# Patient Record
Sex: FEMALE | Race: WHITE | NOT HISPANIC OR LATINO | Employment: OTHER | URBAN - METROPOLITAN AREA
[De-identification: names, ages, dates, MRNs, and addresses within clinical notes are randomized per-mention and may not be internally consistent; named-entity substitution may affect disease eponyms.]

---

## 2017-02-20 ENCOUNTER — CONVERSION ENCOUNTER (OUTPATIENT)
Dept: GENERAL RADIOLOGY | Facility: HOSPITAL | Age: 42
End: 2017-02-20

## 2018-01-22 ENCOUNTER — CONVERSION ENCOUNTER (OUTPATIENT)
Dept: ORTHOPEDIC SURGERY | Facility: CLINIC | Age: 43
End: 2018-01-22

## 2018-01-22 ENCOUNTER — OFFICE VISIT CONVERTED (OUTPATIENT)
Dept: ORTHOPEDIC SURGERY | Facility: CLINIC | Age: 43
End: 2018-01-22
Attending: ORTHOPAEDIC SURGERY

## 2018-03-16 ENCOUNTER — OFFICE VISIT CONVERTED (OUTPATIENT)
Dept: ORTHOPEDIC SURGERY | Facility: CLINIC | Age: 43
End: 2018-03-16
Attending: ORTHOPAEDIC SURGERY

## 2018-05-02 ENCOUNTER — CONVERSION ENCOUNTER (OUTPATIENT)
Dept: GENERAL RADIOLOGY | Facility: HOSPITAL | Age: 43
End: 2018-05-02

## 2018-05-22 ENCOUNTER — CONVERSION ENCOUNTER (OUTPATIENT)
Dept: MAMMOGRAPHY | Facility: HOSPITAL | Age: 43
End: 2018-05-22

## 2018-05-24 ENCOUNTER — CONVERSION ENCOUNTER (OUTPATIENT)
Dept: ULTRASOUND IMAGING | Facility: HOSPITAL | Age: 43
End: 2018-05-24

## 2018-05-31 ENCOUNTER — OFFICE VISIT CONVERTED (OUTPATIENT)
Dept: OTHER | Facility: HOSPITAL | Age: 43
End: 2018-05-31
Attending: SURGERY

## 2018-06-07 ENCOUNTER — TELEPHONE (OUTPATIENT)
Dept: SURGERY | Facility: CLINIC | Age: 43
End: 2018-06-07

## 2018-06-07 DIAGNOSIS — R92.8 ABNORMAL MRI, BREAST: Primary | ICD-10-CM

## 2018-06-07 NOTE — TELEPHONE ENCOUNTER
Patient called to let us know she is scheduled for second look US following Breast MRI @ [facility]   This is scheduled for Monday 6/11 at 2:30.     Zaire

## 2018-06-07 NOTE — TELEPHONE ENCOUNTER
New patient appt for 2nd opinion Left breast cancer-invasive. Breast MRI completed 6/6/18 [facility].     Records requested from [facility]   Patient will bring CD of breast imaging with her to appt.     Friday 6/15/18 7:30.     davidl

## 2018-06-13 ENCOUNTER — TELEPHONE (OUTPATIENT)
Dept: SURGERY | Facility: CLINIC | Age: 43
End: 2018-06-13

## 2018-06-13 NOTE — TELEPHONE ENCOUNTER
Scheduled 2x Mri guided biopsies one lt, one rt breast  Scheduled 2x US guided briopsies one Lt one Rt Breast  [facility] 6-14-18 @ 8:00      kdl

## 2018-06-14 ENCOUNTER — CONVERSION ENCOUNTER (OUTPATIENT)
Dept: MRI IMAGING | Facility: HOSPITAL | Age: 43
End: 2018-06-14

## 2018-06-20 ENCOUNTER — TELEPHONE (OUTPATIENT)
Dept: SURGERY | Facility: CLINIC | Age: 43
End: 2018-06-20

## 2018-06-20 NOTE — TELEPHONE ENCOUNTER
Patients appt here had to be moved due to additional RT Breast Bx [facility] 6/21/18. lml

## 2018-06-21 ENCOUNTER — OFFICE VISIT CONVERTED (OUTPATIENT)
Dept: SURGERY | Facility: CLINIC | Age: 43
End: 2018-06-21
Attending: SURGERY

## 2018-06-21 ENCOUNTER — CONVERSION ENCOUNTER (OUTPATIENT)
Dept: ULTRASOUND IMAGING | Facility: HOSPITAL | Age: 43
End: 2018-06-21

## 2018-07-02 ENCOUNTER — TELEPHONE (OUTPATIENT)
Dept: SURGERY | Facility: CLINIC | Age: 43
End: 2018-07-02

## 2018-07-02 DIAGNOSIS — R92.8 ABNORMAL MRI, BREAST: Primary | ICD-10-CM

## 2018-07-02 NOTE — TELEPHONE ENCOUNTER
I called patient to let her know she will need imaging Friday after she sees     Patient v/u    kdl

## 2018-07-03 ENCOUNTER — TELEPHONE (OUTPATIENT)
Dept: SURGERY | Facility: CLINIC | Age: 43
End: 2018-07-03

## 2018-07-06 ENCOUNTER — HOSPITAL ENCOUNTER (OUTPATIENT)
Dept: MAMMOGRAPHY | Facility: HOSPITAL | Age: 43
Discharge: HOME | End: 2018-07-06
Attending: SURGERY | Admitting: SURGERY

## 2018-07-06 ENCOUNTER — APPOINTMENT (OUTPATIENT)
Dept: ULTRASOUND IMAGING | Facility: HOSPITAL | Age: 43
End: 2018-07-06
Attending: SURGERY

## 2018-07-06 ENCOUNTER — OFFICE VISIT (OUTPATIENT)
Dept: SURGERY | Facility: CLINIC | Age: 43
End: 2018-07-06

## 2018-07-06 VITALS
DIASTOLIC BLOOD PRESSURE: 66 MMHG | SYSTOLIC BLOOD PRESSURE: 94 MMHG | HEART RATE: 66 BPM | BODY MASS INDEX: 25.77 KG/M2 | WEIGHT: 140.06 LBS | OXYGEN SATURATION: 98 % | HEIGHT: 62 IN

## 2018-07-06 DIAGNOSIS — C50.412 MALIGNANT NEOPLASM OF UPPER-OUTER QUADRANT OF LEFT BREAST IN FEMALE, ESTROGEN RECEPTOR POSITIVE (HCC): Primary | ICD-10-CM

## 2018-07-06 DIAGNOSIS — Z72.0 TOBACCO USE: ICD-10-CM

## 2018-07-06 DIAGNOSIS — Z80.8 FH: MELANOMA: ICD-10-CM

## 2018-07-06 DIAGNOSIS — N60.11 FIBROCYSTIC DISEASE OF RIGHT BREAST: ICD-10-CM

## 2018-07-06 DIAGNOSIS — Z80.42 FH: PROSTATE CANCER: ICD-10-CM

## 2018-07-06 DIAGNOSIS — Z17.0 MALIGNANT NEOPLASM OF LOWER-INNER QUADRANT OF LEFT BREAST IN FEMALE, ESTROGEN RECEPTOR POSITIVE (HCC): ICD-10-CM

## 2018-07-06 DIAGNOSIS — R92.8 ABNORMAL MRI, BREAST: ICD-10-CM

## 2018-07-06 DIAGNOSIS — Z80.3 FH: BREAST CANCER: ICD-10-CM

## 2018-07-06 DIAGNOSIS — Z17.0 MALIGNANT NEOPLASM OF UPPER-OUTER QUADRANT OF LEFT BREAST IN FEMALE, ESTROGEN RECEPTOR POSITIVE (HCC): Primary | ICD-10-CM

## 2018-07-06 DIAGNOSIS — C50.312 MALIGNANT NEOPLASM OF LOWER-INNER QUADRANT OF LEFT BREAST IN FEMALE, ESTROGEN RECEPTOR POSITIVE (HCC): ICD-10-CM

## 2018-07-06 PROCEDURE — 77065 DX MAMMO INCL CAD UNI: CPT

## 2018-07-06 PROCEDURE — G0279 TOMOSYNTHESIS, MAMMO: HCPCS

## 2018-07-06 PROCEDURE — 99205 OFFICE O/P NEW HI 60 MIN: CPT | Performed by: SURGERY

## 2018-07-06 RX ORDER — CHLORAL HYDRATE 500 MG
CAPSULE ORAL
COMMUNITY

## 2018-07-06 RX ORDER — MULTIPLE VITAMINS W/ MINERALS TAB 9MG-400MCG
1 TAB ORAL DAILY
COMMUNITY

## 2018-07-06 NOTE — PROGRESS NOTES
Chief Complaint: Solange Srivastava is a 43 y.o. female who was seen in consultation at the request of David Lomax DMD  for newly diagnosed breast cancer    History of Present Illness:  Patient presents with newly diagnosed breast cancer, LEFT breast. She noted no new masses, skin changes, nipple discharge, nipple changes prior to her most recent imaging.  Her most recent imaging includes the following:see below, complex imaging history  She had a biopsy on the following day that showed: see below, complex 5 biopsies done at [facility].  2 cancers discovered one in the left breast lower inner quadrant mammographically detected intermediate grade ductal heavily estrogen and progesterone fed.  The second was mammographically occult upper outer quadrant tubular carcinoma also heavily estrogen and progesterone fed.  She had 3 right breast biopsy 2 of which were benign and concordant and one additional right breast biopsy that was benign and felt to be discordant.    She had not had a breast biopsy in the past.  She has her RIGHT ovary, and takes no hormones.  Her family history includes the followin sister, 4 maternal aunts, no daughters, 2 paternal aunts.  She has one maternal aunt who had breast cancer at 55, a maternal aunt who has melanoma at 45, a brother who had prostate cancer at 38.  She is here for second opinion.    Review of Systems:  Review of Systems   Constitutional: Positive for chills and fatigue.   Gastrointestinal: Positive for blood in stool, constipation and rectal pain.   Psychiatric/Behavioral: Positive for depression and sleep disturbance.   All other systems reviewed and are negative.       Past Medical and Surgical History:  Breast Biopsy History:  Patient had not had a breast biopsy prior to her cancer diagnosis.  Breast Cancer HIstory:  Patient does not have a past medical history of breast cancer.  Breast Operations, and year:  none  Obstetric/Gynecologic History:  Age menstrual periods began:  "13  Patient is postmenopausal due to removal of her uterus and both ovaries in the following year: 2008   Number of pregnancies: 3  Number of live births: 2  Number of abortions or miscarriages: 1  Age of delivery of first child: 24  Patient breast fed, for the following lenth of time: 6 months   Length of time taking birth control pills: 7 yrs   Patient has never taken hormone replacement  Patient had uterus and one ovary removed.     Past Surgical History:   Procedure Laterality Date   • BREAST BIOPSY     • HYSTERECTOMY      left one ovary       Past Medical History:   Diagnosis Date   • Depression    • Tobacco use        Prior Hospitalizations, other than for surgery or childbirth, and year:  None     Social History     Social History   • Marital status:      Spouse name: N/A   • Number of children: N/A   • Years of education: N/A     Occupational History   • Not on file.     Social History Main Topics   • Smoking status: Current Every Day Smoker     Types: Electronic Cigarette   • Smokeless tobacco: Not on file   • Alcohol use No   • Drug use: No   • Sexual activity: Not on file     Other Topics Concern   • Not on file     Social History Narrative   • No narrative on file     Patient is .  Patient is on disability.  Patient does not drink caffeine.    Family History:  No family history on file.    Vital Signs:  BP 94/66   Pulse 66   Ht 157.5 cm (62\")   Wt 63.5 kg (140 lb 1 oz)   SpO2 98%   BMI 25.62 kg/m²      Medications:    Current Outpatient Prescriptions:   •  Multiple Vitamins-Minerals (MULTIVITAMIN WITH MINERALS) tablet tablet, Take 1 tablet by mouth Daily., Disp: , Rfl:   •  Omega-3 Fatty Acids (FISH OIL) 1000 MG capsule capsule, Take  by mouth Daily With Breakfast., Disp: , Rfl:      Allergies:  Allergies   Allergen Reactions   • Azo [Phenazopyridine] Unknown (See Comments)     Unknown    • Sulfa Antibiotics Unknown (See Comments)     Unknown        Physical Examination:  BP 94/66   " "Pulse 66   Ht 157.5 cm (62\")   Wt 63.5 kg (140 lb 1 oz)   SpO2 98%   BMI 25.62 kg/m²   General Appearance:  Patient is in no distress.  She is well kept and has an average build.   Psychiatric:  Patient with appropriate mood and affect. Alert and oriented to self, time, and place.    Breast, RIGHT:  medium sized, symmetric with the contralateral side.  Breast skin is without erythema, edema, rashes.  There are no visible abnormalities upon inspection during the arm-raising maneuver or with hands on hips in the sitting position. There is no nipple retraction, discharge or nipple/areolar skin changes.There are no masses palpable in the sitting or supine positions.  Well healed mammotomies from her recent biopsies    Breast, LEFT:  medium sized, symmetric with the contralateral side.  Breast skin is without erythema, edema, rashes.  There are no visible abnormalities upon inspection during the arm-raising maneuver or with hands on hips in the sitting position. There is no nipple retraction, discharge or nipple/areolar skin changes.There are no masses palpable in the sitting or supine positions.  Well healed mammotomy's from her recent biopsies.    Lymphatic:  There is no axillary, cervical, infraclavicular, or supraclavicular adenopathy bilaterally.  Eyes:  Pupils are round and reactive to light.  Cardiovascular:  Heart rate and rhythm are regular.  Respiratory:  Lungs are clear bilaterally with no crackles or wheezes in any lung field.  Gastrointestinal:  Abdomen is soft, nondistended, and nontender.    Musculoskeletal:  Note the absence of her right hand and a digit on her left hand related to traumatic amputation in  at work.  Good strength in all 4 extremities.   There is good range of motion in both shoulders.    Skin:  No new skin lesions or rashes on the skin excluding the breast (see breast exam above).        Imagin18  [facility]  JED SCREEN MAMMO  SAMANTHA MCKENNA  Irregular 6mm focal asymmetry in the lower " inner left breast, posterior 1/3. BIRADS 0. Heterogeneously dense.     5/22/18 [facility]  DIAG LFT MAMMO W/R2 AND 3D TRUMAN SAMANTHA MCKENNA  Persistent 5mm irregular mass in the lower inner left breast posterior 1/3. No associated microcalcifications. Focused ultrasound lower inner left breast 5mm irregular hypoechoic mass in the 8 o’clock position 7cm from the nipple.  IMPRESSION: Indeterminate 5mm mass in the lower inner left breast 8 o’clock position 7cm from the nipple. BIRADS 4b. Heterogeneously dense.    6/6/18  [facility]  BR MRI  SAMANTHA MCKENNA  No internal mammary chain adenopathy or axillary adenopathy. Lesion 1 is a 6mm enhancing mass in the lower inner left breast 8.4 cm from the nipple consistent with the recently biopsied invasive ductal carcinoma. Lesion 2 is a 7mm enhancing mass left breast upper outer 7.3 cm from the nipple. Lesions 3 is a 7mm area of non masslike enhancement in the upper-outer left breast 4.2 cm from the nipple. Lesion 4 is a 6mm enhancing mass in the upper-inner right breast 3.8 cm from the nipple. Lesion 5 is a 6mm area of non masslike enhancement upper-outer right breast 9.5 cm from the nipple.  IMPRESSION: There are 2 enhancing masses/areas of non masslike enhancement in each breast. Recommend second-look ultrasound examination for further evaluation. BIRADS 0 INCOMPLETE.    6/6/2018 [facility]  JED BR U/S  SAMNATHA MCKENNA  Left breast: 12 o’clock 6cm from the nipple is a ill-defined heterogeneous taller than wide 5x6mm mass which is suspicious and recommend ultrasound-guided core biopsy. These may correspond with a 7mm enhancing mass seen on MRI, however this was reported in the upper outer left breast. 7mm enhancing mass in the upper-outer left breast reported as lesion 2 is best visualized on MRI and therefore recommend MR directed biopsy. There are additional 0.8cm bilobed cyst at the 1 o’clock left breast 0.7 cm heterogeneous hypoechoic mass at the 2 o’clock position 4 cm from the nipple. Probably a  fibroadenoma.   Right breast: 0.8 cm heterogeneous hypoechoic mass 12 o’clock position, 4cm from the nipple has an adjacent complex duct and likely corresponds to the 6mm area of enhancement in the superior slightly medial right breast (MRI-lesion 4). No discrete sonographic abnormality for the 6mm area of non masslike enhancement, upper-outer right breast, 9.5 cm from the nipple.  CONLUSION:  MRI directed biopsy  1. 7mm mass upper-outer left breast 7cm from the nipple.  2. 6mm enhancement upper-outer right breast 9.5cm from the nipple.  Ultrasound-guided core biopsy:  1. 6 mm mass 12 o’clock position of the left breast.  2. 5x8mm complex right breast mass at the 12 o’clock position 4cm from the nipple.  I would recommend at least proceeding with MR MRI directed biopsy of the left breast and bilateral ultrasound-guided biopsy as detailed above. 4b.    Pathology:    5/24/18 [facility]  U/S BR BX LFT  SAMANTHA MCKENNA  4.5 mm mass 8 o’clock position of the left breast. 14 gauge. A total of 4 core biopsy specimens. Clip was then placed. The positioning of the clip was confirmed using mammography.  CONCLUSION: Addendum will be dictated. The malignant diagnosis is concordant.     5/24/18 [facility]  SURG PATH REPORT  SAMANTHA MCKENNA  Invasive ductal carcinoma grade 2. Tubule score = 3, nuclear score = 2, mitoses score = 2. 5 mm. Estrogen (100%). Progesterone (100%). HER2/ayleen (1+). Ki-67 - 5%.    RIGHT BR SITE C U/S SUIDED CORE NEEDLE BX  Benign breast tissue with apocrine metaplasia, stromal fibrosis, and cystic ducts.  ADDENDEUM: final pathology of the right breast rendered stromal fibrosis which is considered concordant and benign.    6/14/18 [facility]  RT BR MRI GUIDED CORE NEEDLE BX  SAMANTHA MCKENNA  Benign breast tissue with sclerosing adenosis and mild stromal fibrosis.  LFT BR SITE B MRI GUIDED NEEDLE BX  Tubular carcinoma. Ricky Grade 1 (tubule score = 1, nuclear score = 1, mitoses score = 1). Size of largest focus: 6mm. Estrogen receptor (100%  strong). Progesterone (95% strong). HER2/ayleen (1+ by IHC). Ki-67 (5%).    6/14/18 [facility]   RT BR MRI GUIDED CORE NEEDLE BX  SAMANTHA MCKENNA  Benign breast tissue with sclerosing adenosis and mild stromal fibrosis  LFT BR SITE B MRI GUIDED CORE NEEDLE BIOPSY  Tubular carcinoma. Ricky grade 1 (tubule score = 1, nuclear score = 1, mitoses score = 1). Size of largest focus: 6mm. Estrogen receptor (100% strong). Progesterone (95% strong). HER2/ayleen (1+ by ihc). Ki-67 (5%).  RT BR SITE C U/S GUIDED CORE NEEDLE BX  Benign breast tissue with apocrine metaplasia stromal fibrosis and cystic ducts    6/21/18 [facility]   SAMANTHA MCKENNA  Final pathology of the right breast rendered stromal fibrosis which is considered concordant and benign.    6/21/18 [facility]  SAMANTHA MCKENNA  Final pathology for the left sided MRI guided biopsy site rendered tubular CA, which is considered concordant. Final pathology of the right sided biopsy site rendered sclerosing adenosis, considered concordant and benign. There is a second suspicious area of non mass enhancement in the far posterior right breast which was not amendable to MRI guided biopsy. Recommend attempted biopsy using tomosynthesis.    6/21/18 [facility]  SAMANTHA MCKENNA  Recent MRI, far posterior 8 - 9 o’clock right breast. 9 gauge vacuum. 12 specimens. A top hat shaped clip was then placed.    [facility]  SAMANTHA MCKENNA  Final pathology for the left sided MRI guided biopsy site rendered tubular CA, which is considered concordant. Final pathology of the right sided biopsy site rendered sclerosing adenosis, considered concordant and benign. There is a second suspicious area of non mass enhancement in the far posterior right breast which was not amenable to MRI guided biopsy. Recommend attempted biopsy using tomosynthesis.    6/21/18 [facility]  SAMANTHA JI  Recent MRI, far posterior 8 - 9 o’clock the right breast. 9 gauge vaccum. 12 specimens. A top hat shaped clip was then  placed.    Procedures:      Assessment:   Diagnosis Plan   1. Malignant neoplasm of upper-outer quadrant of left breast in female, estrogen receptor positive (CMS/HCC)     2. Malignant neoplasm of lower-inner quadrant of left breast in female, estrogen receptor positive (CMS/HCC)     3. Abnormal MRI, breast     4. Fibrocystic disease of right breast     5. FH: breast cancer     6. FH: prostate cancer     7. FH: melanoma     8. Tobacco use     1-2  LEFt breast LIQ, 8:00 7 CFN, posterior third, IMC, NST, int grade, 3,2,2, 5 mm, , , her 2 ayleen 1+, Ki 67 is 5%  - 6mm residual on MRI, 5mm greatest on core  Not palpable    LEFT breast UOQ 12:30, 7.3 CFN- tubular cancer, low grade, 1,1,1, 6mm, , OR 95, her 2 ayleen 1+, Ki 67 is 5%.  6mm in core.   Not palpable and mammographically occult.    Multicentric, synchronous primaries    (note a third area  On the LEFT seen on MRI LEFT UOQ 4.2 CFN, 7mm- read as BR4 12:00 6 CFN on US- rec US biopsy- said at time of US biopsy that this lesion was the same as the other LEFT UOQ lesion- originally 2 lesions reported)    Clinical stage T1bN0- stage IA    3-4  RIGHT UOQ 9.5 CFN MRI biopsy- - x marker- sclerosing adenosis with fibrosis- concordant  RIGHT 12:00- US biopsy- ribbon marker- apocrine metaplasia, stromal fibrosis, cystic ducts- concordant  RIGHT stereo 6-21-18 (after the 2 above) for nonmass enhancement far posterior RIGHT- tophat marker- benign breast tissue- discordant- rec MRI localized needle loc excision in the operating room    5-  MA age 57    6-  MA age 47    7-  Brother prostate 38    8-  Off on since age 15- 1ppd        Plan:  We reviewed in great detail the complexities of her case, most notably her imaging and pathology results.    We then discussed breast cancer in general, role of surgeon and medical oncologist and possibly radiation oncologist.     Recommend:  1- clarification about the RIGHT posterior third biopsy done 6-21-18- have recommended  radiology opinion about the initial lesion seen on MRI and concern or need for excision- she would like to have that done here. Offered [facility] as well.    2- genetic testing due to 2 cancers by age 43 and FH of breast, melanoma, prostate    3-  LEFt total mastectomy and SLNB for LEFT cancers- RIGHT side dependent on resolution of finding #1 and/or genetic testing    4-  FU with Dr Jarvis for surgical care and her preop CT chest    5-  FU with Dr Lomax for her recent diagnosed colitis and subsequent medical followup    I believe she and her family had adequate time for questions and I asked her to let us know f she had additional questions.    -- Addendum- She had imaging with Dr Delatorre today-0 RIGHT DX mammogram. He reviewed her outside MRI as well as her mammo here today. He felt that he may not have biopsied any of the 3 sites in the RIGHT breast/were not suspicious to him.  Options are excision at time of surgery versus 6 mo MRI followup. I let Kami and her  know this opinion today.    She is due her genetics result on Tuesday and is going to call Dr Jarvis for Fu.      Samra Ashraf MD        We have spent 60 minutes in visit today, 45 in face to face .      Next Appointment:  Return for any future concerns.      EMR Dragon/transcription disclaimer:    Much of this encounter note is an electronic transcription/translocation of spoken language to printed text.  The electronic translation of spoken language may permit erroneous, or at times, nonsensical words or phrases to be inadvertently transcribed.  Although I have reviewed the note from such areas, some may still exist.

## 2018-07-09 ENCOUNTER — TELEPHONE (OUTPATIENT)
Dept: SURGERY | Facility: CLINIC | Age: 43
End: 2018-07-09

## 2018-07-09 NOTE — TELEPHONE ENCOUNTER
Final report dated July 6, 2018 on Marylee from diagnostic mammogram right with 3-D: Scattered fibroglandular densities.  There are 3 metallic clips in the right breast.  There is some focal increased density surrounding the metallic clips shaped like a T in the posterior one third lateral right breast.  I see no suspicious calcifications or areas of architectural distortion.  There is evidence of 3 prior biopsies in the right breast, but I see no suspicious mammographic findings.  If the biopsies returned as benign, then correlation with MRI report associated with the biopsies is suggested.  Mammographically no abnormalities seen she any further action this point is indicated.  A 6-12 month breast MRI may be appropriate if the lesions which were to undergo biopsy on the order of 5 mm in size, given the lack of specificity for smaller lesions on MRI and the probably benign nature or lesions associated with considerable increased T2 signal.  BI-RADS 3.  Short-term follow-up indicated.

## 2018-07-11 ENCOUNTER — TELEPHONE (OUTPATIENT)
Dept: SURGERY | Facility: CLINIC | Age: 43
End: 2018-07-11

## 2018-07-11 ENCOUNTER — OFFICE VISIT CONVERTED (OUTPATIENT)
Dept: SURGERY | Facility: CLINIC | Age: 43
End: 2018-07-11
Attending: SURGERY

## 2018-07-16 ENCOUNTER — OFFICE VISIT CONVERTED (OUTPATIENT)
Dept: PLASTIC SURGERY | Facility: CLINIC | Age: 43
End: 2018-07-16
Attending: PLASTIC SURGERY

## 2018-07-26 ENCOUNTER — OFFICE VISIT CONVERTED (OUTPATIENT)
Dept: SURGERY | Facility: CLINIC | Age: 43
End: 2018-07-26
Attending: SURGERY

## 2018-08-20 ENCOUNTER — CONVERSION ENCOUNTER (OUTPATIENT)
Dept: PLASTIC SURGERY | Facility: CLINIC | Age: 43
End: 2018-08-20

## 2018-08-20 ENCOUNTER — OFFICE VISIT CONVERTED (OUTPATIENT)
Dept: PLASTIC SURGERY | Facility: CLINIC | Age: 43
End: 2018-08-20
Attending: PLASTIC SURGERY

## 2018-08-22 ENCOUNTER — OFFICE VISIT CONVERTED (OUTPATIENT)
Dept: SURGERY | Facility: CLINIC | Age: 43
End: 2018-08-22
Attending: SURGERY

## 2018-08-27 ENCOUNTER — OFFICE VISIT CONVERTED (OUTPATIENT)
Dept: PLASTIC SURGERY | Facility: CLINIC | Age: 43
End: 2018-08-27
Attending: PLASTIC SURGERY

## 2018-08-30 ENCOUNTER — CONVERSION ENCOUNTER (OUTPATIENT)
Dept: PLASTIC SURGERY | Facility: CLINIC | Age: 43
End: 2018-08-30

## 2018-08-30 ENCOUNTER — OFFICE VISIT CONVERTED (OUTPATIENT)
Dept: PLASTIC SURGERY | Facility: CLINIC | Age: 43
End: 2018-08-30
Attending: PLASTIC SURGERY

## 2018-09-06 ENCOUNTER — PROCEDURE VISIT CONVERTED (OUTPATIENT)
Dept: PLASTIC SURGERY | Facility: CLINIC | Age: 43
End: 2018-09-06
Attending: PLASTIC SURGERY

## 2018-09-13 ENCOUNTER — CONVERSION ENCOUNTER (OUTPATIENT)
Dept: PLASTIC SURGERY | Facility: CLINIC | Age: 43
End: 2018-09-13

## 2018-09-13 ENCOUNTER — OFFICE VISIT CONVERTED (OUTPATIENT)
Dept: PLASTIC SURGERY | Facility: CLINIC | Age: 43
End: 2018-09-13
Attending: PLASTIC SURGERY

## 2018-09-20 ENCOUNTER — CONVERSION ENCOUNTER (OUTPATIENT)
Dept: PLASTIC SURGERY | Facility: CLINIC | Age: 43
End: 2018-09-20

## 2018-09-20 ENCOUNTER — PROCEDURE VISIT CONVERTED (OUTPATIENT)
Dept: PLASTIC SURGERY | Facility: CLINIC | Age: 43
End: 2018-09-20
Attending: PLASTIC SURGERY

## 2018-10-16 ENCOUNTER — PROCEDURE VISIT CONVERTED (OUTPATIENT)
Dept: OTHER | Facility: HOSPITAL | Age: 43
End: 2018-10-16
Attending: PLASTIC SURGERY

## 2018-10-22 ENCOUNTER — OFFICE VISIT CONVERTED (OUTPATIENT)
Dept: PLASTIC SURGERY | Facility: CLINIC | Age: 43
End: 2018-10-22
Attending: PLASTIC SURGERY

## 2018-11-12 ENCOUNTER — OFFICE VISIT CONVERTED (OUTPATIENT)
Dept: PLASTIC SURGERY | Facility: CLINIC | Age: 43
End: 2018-11-12
Attending: PLASTIC SURGERY

## 2018-12-14 ENCOUNTER — OFFICE VISIT CONVERTED (OUTPATIENT)
Dept: PLASTIC SURGERY | Facility: CLINIC | Age: 43
End: 2018-12-14
Attending: PLASTIC SURGERY

## 2018-12-14 ENCOUNTER — CONVERSION ENCOUNTER (OUTPATIENT)
Dept: PLASTIC SURGERY | Facility: CLINIC | Age: 43
End: 2018-12-14

## 2019-01-07 ENCOUNTER — HOSPITAL ENCOUNTER (OUTPATIENT)
Dept: ONCOLOGY | Facility: HOSPITAL | Age: 44
Discharge: HOME | End: 2019-01-07
Attending: INTERNAL MEDICINE

## 2019-01-14 ENCOUNTER — HOSPITAL ENCOUNTER (OUTPATIENT)
Dept: ULTRASOUND IMAGING | Facility: HOSPITAL | Age: 44
Discharge: HOME | End: 2019-01-14
Attending: INTERNAL MEDICINE

## 2019-01-17 ENCOUNTER — HOSPITAL ENCOUNTER (OUTPATIENT)
Dept: PREADMISSION TESTING | Facility: HOSPITAL | Age: 44
Discharge: HOME | End: 2019-01-17
Attending: PLASTIC SURGERY

## 2019-01-23 ENCOUNTER — HOSPITAL ENCOUNTER (OUTPATIENT)
Dept: PERIOP | Facility: HOSPITAL | Age: 44
Setting detail: HOSPITAL OUTPATIENT SURGERY
Discharge: HOME | End: 2019-01-23
Attending: PLASTIC SURGERY

## 2019-01-23 ENCOUNTER — PROCEDURE VISIT CONVERTED (OUTPATIENT)
Dept: OTHER | Facility: HOSPITAL | Age: 44
End: 2019-01-23
Attending: PLASTIC SURGERY

## 2019-01-28 ENCOUNTER — OFFICE VISIT CONVERTED (OUTPATIENT)
Dept: PLASTIC SURGERY | Facility: CLINIC | Age: 44
End: 2019-01-28
Attending: PLASTIC SURGERY

## 2019-01-29 ENCOUNTER — HOSPITAL ENCOUNTER (OUTPATIENT)
Dept: GENERAL RADIOLOGY | Facility: HOSPITAL | Age: 44
Discharge: HOME | End: 2019-01-29
Attending: NURSE PRACTITIONER

## 2019-02-07 ENCOUNTER — HOSPITAL ENCOUNTER (OUTPATIENT)
Dept: ONCOLOGY | Facility: HOSPITAL | Age: 44
Discharge: HOME | End: 2019-02-07
Attending: NURSE PRACTITIONER

## 2019-02-18 ENCOUNTER — OFFICE VISIT CONVERTED (OUTPATIENT)
Dept: PLASTIC SURGERY | Facility: CLINIC | Age: 44
End: 2019-02-18
Attending: PLASTIC SURGERY

## 2019-02-18 ENCOUNTER — HOSPITAL ENCOUNTER (OUTPATIENT)
Dept: GENERAL RADIOLOGY | Facility: HOSPITAL | Age: 44
Discharge: HOME | End: 2019-02-18
Attending: NURSE PRACTITIONER

## 2019-02-18 ENCOUNTER — CONVERSION ENCOUNTER (OUTPATIENT)
Dept: PLASTIC SURGERY | Facility: CLINIC | Age: 44
End: 2019-02-18

## 2019-03-27 ENCOUNTER — HOSPITAL ENCOUNTER (OUTPATIENT)
Dept: PHYSICAL THERAPY | Facility: CLINIC | Age: 44
Setting detail: RECURRING SERIES
Discharge: HOME | End: 2019-05-23
Attending: NURSE PRACTITIONER

## 2019-05-24 ENCOUNTER — OFFICE VISIT CONVERTED (OUTPATIENT)
Dept: PLASTIC SURGERY | Facility: CLINIC | Age: 44
End: 2019-05-24
Attending: PLASTIC SURGERY

## 2019-06-04 ENCOUNTER — HOSPITAL ENCOUNTER (OUTPATIENT)
Dept: PERIOP | Facility: HOSPITAL | Age: 44
Setting detail: HOSPITAL OUTPATIENT SURGERY
Discharge: HOME | End: 2019-06-04
Attending: PLASTIC SURGERY

## 2019-06-04 ENCOUNTER — PROCEDURE VISIT CONVERTED (OUTPATIENT)
Dept: OTHER | Facility: HOSPITAL | Age: 44
End: 2019-06-04
Attending: PLASTIC SURGERY

## 2019-06-07 ENCOUNTER — OFFICE VISIT CONVERTED (OUTPATIENT)
Dept: PLASTIC SURGERY | Facility: CLINIC | Age: 44
End: 2019-06-07
Attending: PLASTIC SURGERY

## 2019-06-14 ENCOUNTER — OFFICE VISIT CONVERTED (OUTPATIENT)
Dept: PLASTIC SURGERY | Facility: CLINIC | Age: 44
End: 2019-06-14
Attending: PLASTIC SURGERY

## 2019-07-15 ENCOUNTER — CONVERSION ENCOUNTER (OUTPATIENT)
Dept: PLASTIC SURGERY | Facility: CLINIC | Age: 44
End: 2019-07-15

## 2019-07-15 ENCOUNTER — OFFICE VISIT CONVERTED (OUTPATIENT)
Dept: PLASTIC SURGERY | Facility: CLINIC | Age: 44
End: 2019-07-15
Attending: PLASTIC SURGERY

## 2019-07-25 ENCOUNTER — HOSPITAL ENCOUNTER (OUTPATIENT)
Dept: ONCOLOGY | Facility: HOSPITAL | Age: 44
Discharge: HOME | End: 2019-07-25
Attending: INTERNAL MEDICINE

## 2019-07-25 LAB
ALBUMIN SERPL-MCNC: 4 G/DL (ref 3.5–5)
ALBUMIN/GLOB SERPL: 1.6 {RATIO} (ref 1.4–2.6)
ALP SERPL-CCNC: 94 U/L (ref 42–98)
ALT SERPL-CCNC: 12 U/L (ref 10–40)
ANION GAP SERPL CALC-SCNC: 17 MMOL/L (ref 8–19)
AST SERPL-CCNC: 17 U/L (ref 15–50)
BASOPHILS # BLD AUTO: 0.03 10*3/UL (ref 0–0.2)
BASOPHILS NFR BLD AUTO: 0.5 % (ref 0–3)
BILIRUB SERPL-MCNC: 0.35 MG/DL (ref 0.2–1.3)
BUN SERPL-MCNC: 18 MG/DL (ref 5–25)
BUN/CREAT SERPL: 26 {RATIO} (ref 6–20)
CALCIUM SERPL-MCNC: 9.2 MG/DL (ref 8.7–10.4)
CHLORIDE SERPL-SCNC: 100 MMOL/L (ref 99–111)
CONV ABS IMM GRAN: 0.01 10*3/UL (ref 0–0.2)
CONV CO2: 27 MMOL/L (ref 22–32)
CONV IMMATURE GRAN: 0.2 % (ref 0–1.8)
CONV TOTAL PROTEIN: 6.5 G/DL (ref 6.3–8.2)
CREAT UR-MCNC: 0.69 MG/DL (ref 0.5–0.9)
DEPRECATED RDW RBC AUTO: 42.5 FL (ref 36.4–46.3)
EOSINOPHIL # BLD AUTO: 0.15 10*3/UL (ref 0–0.7)
EOSINOPHIL # BLD AUTO: 2.5 % (ref 0–7)
ERYTHROCYTE [DISTWIDTH] IN BLOOD BY AUTOMATED COUNT: 12.9 % (ref 11.7–14.4)
GFR SERPLBLD BASED ON 1.73 SQ M-ARVRAT: >60 ML/MIN/{1.73_M2}
GLOBULIN UR ELPH-MCNC: 2.5 G/DL (ref 2–3.5)
GLUCOSE SERPL-MCNC: 144 MG/DL (ref 65–99)
HBA1C MFR BLD: 12.4 G/DL (ref 12–16)
HCT VFR BLD AUTO: 36.9 % (ref 37–47)
LYMPHOCYTES # BLD AUTO: 2.08 10*3/UL (ref 1–5)
MCH RBC QN AUTO: 30 PG (ref 27–31)
MCHC RBC AUTO-ENTMCNC: 33.6 G/DL (ref 33–37)
MCV RBC AUTO: 89.1 FL (ref 81–99)
MONOCYTES # BLD AUTO: 0.38 10*3/UL (ref 0.2–1.2)
MONOCYTES NFR BLD AUTO: 6.4 % (ref 3–10)
NEUTROPHILS # BLD AUTO: 3.25 10*3/UL (ref 2–8)
NEUTROPHILS NFR BLD AUTO: 55.1 % (ref 30–85)
NRBC CBCN: 0 % (ref 0–0.7)
OSMOLALITY SERPL CALC.SUM OF ELEC: 294 MOSM/KG (ref 273–304)
PLATELET # BLD AUTO: 244 10*3/UL (ref 130–400)
PMV BLD AUTO: 10.6 FL (ref 9.4–12.3)
POTASSIUM SERPL-SCNC: 4 MMOL/L (ref 3.5–5.3)
RBC # BLD AUTO: 4.14 10*6/UL (ref 4.2–5.4)
SODIUM SERPL-SCNC: 140 MMOL/L (ref 135–147)
VARIANT LYMPHS NFR BLD MANUAL: 35.3 % (ref 20–45)
WBC # BLD AUTO: 5.9 10*3/UL (ref 4.8–10.8)

## 2019-07-26 LAB
CANCER AG15-3 SERPL-ACNC: 12.6 U/ML (ref 0–25)
CANCER AG27-29 SERPL-ACNC: 12.8 U/ML (ref 0–38.6)

## 2019-08-06 ENCOUNTER — HOSPITAL ENCOUNTER (OUTPATIENT)
Dept: NUCLEAR MEDICINE | Facility: HOSPITAL | Age: 44
Discharge: HOME | End: 2019-08-06
Attending: INTERNAL MEDICINE

## 2019-09-09 ENCOUNTER — HOSPITAL ENCOUNTER (OUTPATIENT)
Dept: MAMMOGRAPHY | Facility: HOSPITAL | Age: 44
Discharge: HOME | End: 2019-09-09
Attending: INTERNAL MEDICINE

## 2019-09-20 ENCOUNTER — OFFICE VISIT CONVERTED (OUTPATIENT)
Dept: PLASTIC SURGERY | Facility: CLINIC | Age: 44
End: 2019-09-20
Attending: PLASTIC SURGERY

## 2019-09-24 ENCOUNTER — OFFICE VISIT CONVERTED (OUTPATIENT)
Dept: GASTROENTEROLOGY | Facility: CLINIC | Age: 44
End: 2019-09-24
Attending: NURSE PRACTITIONER

## 2019-10-02 ENCOUNTER — HOSPITAL ENCOUNTER (OUTPATIENT)
Dept: GASTROENTEROLOGY | Facility: HOSPITAL | Age: 44
Setting detail: HOSPITAL OUTPATIENT SURGERY
Discharge: HOME | End: 2019-10-02
Attending: INTERNAL MEDICINE

## 2019-10-15 ENCOUNTER — HOSPITAL ENCOUNTER (OUTPATIENT)
Dept: PERIOP | Facility: HOSPITAL | Age: 44
Setting detail: HOSPITAL OUTPATIENT SURGERY
Discharge: HOME | End: 2019-10-15
Attending: PLASTIC SURGERY

## 2019-10-16 ENCOUNTER — CONVERSION ENCOUNTER (OUTPATIENT)
Dept: PLASTIC SURGERY | Facility: CLINIC | Age: 44
End: 2019-10-16
Attending: PLASTIC SURGERY

## 2019-10-18 ENCOUNTER — OFFICE VISIT CONVERTED (OUTPATIENT)
Dept: PLASTIC SURGERY | Facility: CLINIC | Age: 44
End: 2019-10-18
Attending: PLASTIC SURGERY

## 2019-10-18 ENCOUNTER — CONVERSION ENCOUNTER (OUTPATIENT)
Dept: PLASTIC SURGERY | Facility: CLINIC | Age: 44
End: 2019-10-18

## 2019-10-21 ENCOUNTER — OFFICE VISIT CONVERTED (OUTPATIENT)
Dept: PLASTIC SURGERY | Facility: CLINIC | Age: 44
End: 2019-10-21
Attending: PLASTIC SURGERY

## 2019-10-24 ENCOUNTER — OFFICE VISIT CONVERTED (OUTPATIENT)
Dept: PLASTIC SURGERY | Facility: CLINIC | Age: 44
End: 2019-10-24
Attending: PLASTIC SURGERY

## 2019-11-07 ENCOUNTER — OFFICE VISIT CONVERTED (OUTPATIENT)
Dept: PLASTIC SURGERY | Facility: CLINIC | Age: 44
End: 2019-11-07
Attending: PLASTIC SURGERY

## 2019-11-07 ENCOUNTER — CONVERSION ENCOUNTER (OUTPATIENT)
Dept: PLASTIC SURGERY | Facility: CLINIC | Age: 44
End: 2019-11-07

## 2019-11-22 ENCOUNTER — HOSPITAL ENCOUNTER (OUTPATIENT)
Dept: ONCOLOGY | Facility: HOSPITAL | Age: 44
Discharge: HOME | End: 2019-11-22
Attending: NURSE PRACTITIONER

## 2019-12-11 ENCOUNTER — OFFICE VISIT CONVERTED (OUTPATIENT)
Dept: GASTROENTEROLOGY | Facility: CLINIC | Age: 44
End: 2019-12-11
Attending: NURSE PRACTITIONER

## 2020-01-10 ENCOUNTER — OFFICE VISIT CONVERTED (OUTPATIENT)
Dept: PLASTIC SURGERY | Facility: CLINIC | Age: 45
End: 2020-01-10
Attending: PLASTIC SURGERY

## 2020-02-24 ENCOUNTER — HOSPITAL ENCOUNTER (OUTPATIENT)
Dept: ONCOLOGY | Facility: HOSPITAL | Age: 45
Discharge: HOME | End: 2020-02-24
Attending: NURSE PRACTITIONER

## 2020-02-27 ENCOUNTER — PROCEDURE VISIT CONVERTED (OUTPATIENT)
Dept: PLASTIC SURGERY | Facility: CLINIC | Age: 45
End: 2020-02-27
Attending: PLASTIC SURGERY

## 2020-03-05 ENCOUNTER — CONVERSION ENCOUNTER (OUTPATIENT)
Dept: PLASTIC SURGERY | Facility: CLINIC | Age: 45
End: 2020-03-05

## 2020-03-05 ENCOUNTER — OFFICE VISIT CONVERTED (OUTPATIENT)
Dept: PLASTIC SURGERY | Facility: CLINIC | Age: 45
End: 2020-03-05
Attending: PLASTIC SURGERY

## 2020-04-17 ENCOUNTER — CONVERSION ENCOUNTER (OUTPATIENT)
Dept: ORTHOPEDIC SURGERY | Facility: CLINIC | Age: 45
End: 2020-04-17

## 2020-04-17 ENCOUNTER — OFFICE VISIT CONVERTED (OUTPATIENT)
Dept: ORTHOPEDIC SURGERY | Facility: CLINIC | Age: 45
End: 2020-04-17
Attending: ORTHOPAEDIC SURGERY

## 2020-05-03 ENCOUNTER — HOSPITAL ENCOUNTER (OUTPATIENT)
Dept: PERIOP | Facility: HOSPITAL | Age: 45
Setting detail: HOSPITAL OUTPATIENT SURGERY
Discharge: HOME | End: 2020-05-03
Attending: SURGERY

## 2020-05-03 LAB
ALBUMIN SERPL-MCNC: 4.2 G/DL (ref 3.5–5)
ALBUMIN/GLOB SERPL: 1.5 {RATIO} (ref 1.4–2.6)
ALP SERPL-CCNC: 107 U/L (ref 42–98)
ALT SERPL-CCNC: 11 U/L (ref 10–40)
ANION GAP SERPL CALC-SCNC: 17 MMOL/L (ref 8–19)
APPEARANCE UR: CLEAR
AST SERPL-CCNC: 14 U/L (ref 15–50)
BASOPHILS # BLD AUTO: 0.02 10*3/UL (ref 0–0.2)
BASOPHILS NFR BLD AUTO: 0.3 % (ref 0–3)
BILIRUB SERPL-MCNC: 0.47 MG/DL (ref 0.2–1.3)
BILIRUB UR QL: NEGATIVE
BUN SERPL-MCNC: 17 MG/DL (ref 5–25)
BUN/CREAT SERPL: 31 {RATIO} (ref 6–20)
CALCIUM SERPL-MCNC: 9.2 MG/DL (ref 8.7–10.4)
CHLORIDE SERPL-SCNC: 101 MMOL/L (ref 99–111)
COLOR UR: YELLOW
CONV ABS IMM GRAN: 0.01 10*3/UL (ref 0–0.2)
CONV BACTERIA: NEGATIVE
CONV CO2: 24 MMOL/L (ref 22–32)
CONV COLLECTION SOURCE (UA): ABNORMAL
CONV IMMATURE GRAN: 0.1 % (ref 0–1.8)
CONV TOTAL PROTEIN: 7 G/DL (ref 6.3–8.2)
CONV UROBILINOGEN IN URINE BY AUTOMATED TEST STRIP: 0.2 {EHRLICHU}/DL (ref 0.1–1)
CREAT UR-MCNC: 0.55 MG/DL (ref 0.5–0.9)
DEPRECATED RDW RBC AUTO: 42.1 FL (ref 36.4–46.3)
EOSINOPHIL # BLD AUTO: 0.08 10*3/UL (ref 0–0.7)
EOSINOPHIL # BLD AUTO: 1.1 % (ref 0–7)
ERYTHROCYTE [DISTWIDTH] IN BLOOD BY AUTOMATED COUNT: 13.2 % (ref 11.7–14.4)
GFR SERPLBLD BASED ON 1.73 SQ M-ARVRAT: >60 ML/MIN/{1.73_M2}
GLOBULIN UR ELPH-MCNC: 2.8 G/DL (ref 2–3.5)
GLUCOSE SERPL-MCNC: 92 MG/DL (ref 65–99)
GLUCOSE UR QL: NEGATIVE MG/DL
HCG INTACT+B SERPL-ACNC: <0.5 M[IU]/ML (ref 0–5)
HCT VFR BLD AUTO: 37.5 % (ref 37–47)
HGB BLD-MCNC: 12.8 G/DL (ref 12–16)
HGB UR QL STRIP: ABNORMAL
KETONES UR QL STRIP: NEGATIVE MG/DL
LEUKOCYTE ESTERASE UR QL STRIP: ABNORMAL
LIPASE SERPL-CCNC: 21 U/L (ref 5–51)
LYMPHOCYTES # BLD AUTO: 1.75 10*3/UL (ref 1–5)
LYMPHOCYTES NFR BLD AUTO: 24.6 % (ref 20–45)
MCH RBC QN AUTO: 29.6 PG (ref 27–31)
MCHC RBC AUTO-ENTMCNC: 34.1 G/DL (ref 33–37)
MCV RBC AUTO: 86.8 FL (ref 81–99)
MONOCYTES # BLD AUTO: 0.28 10*3/UL (ref 0.2–1.2)
MONOCYTES NFR BLD AUTO: 3.9 % (ref 3–10)
NEUTROPHILS # BLD AUTO: 4.96 10*3/UL (ref 2–8)
NEUTROPHILS NFR BLD AUTO: 70 % (ref 30–85)
NITRITE UR QL STRIP: NEGATIVE
NRBC CBCN: 0 % (ref 0–0.7)
OSMOLALITY SERPL CALC.SUM OF ELEC: 287 MOSM/KG (ref 273–304)
PH UR STRIP.AUTO: 7 [PH] (ref 5–8)
PLATELET # BLD AUTO: 229 10*3/UL (ref 130–400)
PMV BLD AUTO: 10.2 FL (ref 9.4–12.3)
POTASSIUM SERPL-SCNC: 4.2 MMOL/L (ref 3.5–5.3)
PROT UR QL: NEGATIVE MG/DL
RBC # BLD AUTO: 4.32 10*6/UL (ref 4.2–5.4)
RBC #/AREA URNS HPF: ABNORMAL /[HPF]
SODIUM SERPL-SCNC: 138 MMOL/L (ref 135–147)
SP GR UR: 1.02 (ref 1–1.03)
WBC # BLD AUTO: 7.1 10*3/UL (ref 4.8–10.8)
WBC #/AREA URNS HPF: ABNORMAL /[HPF]

## 2020-05-18 ENCOUNTER — OFFICE VISIT CONVERTED (OUTPATIENT)
Dept: SURGERY | Facility: CLINIC | Age: 45
End: 2020-05-18
Attending: SURGERY

## 2020-05-20 ENCOUNTER — OFFICE VISIT CONVERTED (OUTPATIENT)
Dept: PLASTIC SURGERY | Facility: CLINIC | Age: 45
End: 2020-05-20
Attending: PLASTIC SURGERY

## 2020-05-22 ENCOUNTER — OFFICE VISIT CONVERTED (OUTPATIENT)
Dept: PLASTIC SURGERY | Facility: CLINIC | Age: 45
End: 2020-05-22
Attending: PLASTIC SURGERY

## 2020-05-29 ENCOUNTER — OFFICE VISIT CONVERTED (OUTPATIENT)
Dept: PLASTIC SURGERY | Facility: CLINIC | Age: 45
End: 2020-05-29
Attending: PLASTIC SURGERY

## 2020-05-29 ENCOUNTER — HOSPITAL ENCOUNTER (OUTPATIENT)
Dept: ONCOLOGY | Facility: HOSPITAL | Age: 45
Discharge: HOME | End: 2020-05-29
Attending: NURSE PRACTITIONER

## 2020-06-11 ENCOUNTER — HOSPITAL ENCOUNTER (OUTPATIENT)
Dept: MAMMOGRAPHY | Facility: HOSPITAL | Age: 45
Discharge: HOME | End: 2020-06-11
Attending: NURSE PRACTITIONER

## 2020-06-11 ENCOUNTER — TELEMEDICINE CONVERTED (OUTPATIENT)
Dept: GASTROENTEROLOGY | Facility: CLINIC | Age: 45
End: 2020-06-11
Attending: NURSE PRACTITIONER

## 2020-06-12 ENCOUNTER — OFFICE VISIT CONVERTED (OUTPATIENT)
Dept: ONCOLOGY | Facility: HOSPITAL | Age: 45
End: 2020-06-12
Attending: NURSE PRACTITIONER

## 2020-07-02 ENCOUNTER — CONVERSION ENCOUNTER (OUTPATIENT)
Dept: PLASTIC SURGERY | Facility: CLINIC | Age: 45
End: 2020-07-02

## 2020-07-02 ENCOUNTER — OFFICE VISIT CONVERTED (OUTPATIENT)
Dept: PLASTIC SURGERY | Facility: CLINIC | Age: 45
End: 2020-07-02
Attending: PLASTIC SURGERY

## 2020-10-27 ENCOUNTER — OFFICE VISIT CONVERTED (OUTPATIENT)
Dept: ONCOLOGY | Facility: HOSPITAL | Age: 45
End: 2020-10-27
Attending: NURSE PRACTITIONER

## 2020-10-27 ENCOUNTER — HOSPITAL ENCOUNTER (OUTPATIENT)
Dept: ONCOLOGY | Facility: HOSPITAL | Age: 45
Discharge: HOME | End: 2020-10-27
Attending: NURSE PRACTITIONER

## 2021-01-03 ENCOUNTER — HOSPITAL ENCOUNTER (OUTPATIENT)
Dept: URGENT CARE | Facility: CLINIC | Age: 46
Discharge: HOME | End: 2021-01-03
Attending: NURSE PRACTITIONER

## 2021-01-14 ENCOUNTER — OFFICE VISIT CONVERTED (OUTPATIENT)
Dept: GASTROENTEROLOGY | Facility: CLINIC | Age: 46
End: 2021-01-14
Attending: NURSE PRACTITIONER

## 2021-01-27 ENCOUNTER — HOSPITAL ENCOUNTER (OUTPATIENT)
Dept: GASTROENTEROLOGY | Facility: HOSPITAL | Age: 46
Setting detail: HOSPITAL OUTPATIENT SURGERY
Discharge: HOME | End: 2021-01-27
Attending: INTERNAL MEDICINE

## 2021-02-04 ENCOUNTER — OFFICE VISIT CONVERTED (OUTPATIENT)
Dept: GASTROENTEROLOGY | Facility: CLINIC | Age: 46
End: 2021-02-04
Attending: NURSE PRACTITIONER

## 2021-04-27 ENCOUNTER — CONVERSION ENCOUNTER (OUTPATIENT)
Dept: ONCOLOGY | Facility: HOSPITAL | Age: 46
End: 2021-04-27

## 2021-04-27 ENCOUNTER — HOSPITAL ENCOUNTER (OUTPATIENT)
Dept: ONCOLOGY | Facility: HOSPITAL | Age: 46
Discharge: HOME | End: 2021-04-27
Attending: INTERNAL MEDICINE

## 2021-04-27 LAB
ALBUMIN SERPL-MCNC: 4.2 G/DL (ref 3.5–5)
ALBUMIN/GLOB SERPL: 1.6 {RATIO} (ref 1.4–2.6)
ALP SERPL-CCNC: 108 U/L (ref 42–98)
ALT SERPL-CCNC: 11 U/L (ref 10–40)
ANION GAP SERPL CALC-SCNC: 14 MMOL/L (ref 8–19)
AST SERPL-CCNC: 14 U/L (ref 15–50)
BASOPHILS # BLD AUTO: 0.01 10*3/UL (ref 0–0.2)
BASOPHILS NFR BLD AUTO: 0.2 % (ref 0–3)
BILIRUB SERPL-MCNC: 0.33 MG/DL (ref 0.2–1.3)
BUN SERPL-MCNC: 21 MG/DL (ref 5–25)
BUN/CREAT SERPL: 38 {RATIO} (ref 6–20)
CALCIUM SERPL-MCNC: 9.5 MG/DL (ref 8.7–10.4)
CHLORIDE SERPL-SCNC: 104 MMOL/L (ref 99–111)
CONV ABS IMM GRAN: 0 10*3/UL (ref 0–0.54)
CONV CO2: 25 MMOL/L (ref 22–32)
CONV EOSINOPHILS PERCENT BY MANUAL COUNT: 1.2 % (ref 0–7)
CONV IMMATURE GRAN: 0 % (ref 0–0.4)
CONV TOTAL PROTEIN: 6.9 G/DL (ref 6.3–8.2)
CREAT UR-MCNC: 0.56 MG/DL (ref 0.5–0.9)
EOSINOPHIL # BLD MANUAL: 0.06 10*3/UL (ref 0–0.7)
ERYTHROCYTE [DISTWIDTH] IN BLOOD BY AUTOMATED COUNT: 12.8 % (ref 11.5–14.5)
ERYTHROCYTE [DISTWIDTH] IN BLOOD BY AUTOMATED COUNT: 39.8 FL
GFR SERPLBLD BASED ON 1.73 SQ M-ARVRAT: >60 ML/MIN/{1.73_M2}
GLOBULIN UR ELPH-MCNC: 2.7 G/DL (ref 2–3.5)
GLUCOSE SERPL-MCNC: 83 MG/DL (ref 65–99)
HBA1C MFR BLD: 13 G/DL (ref 12–16)
HCT VFR BLD AUTO: 37.6 % (ref 37–47)
LYMPHOCYTES # BLD AUTO: 1.82 10*3/UL (ref 1–5)
LYMPHOCYTES NFR BLD AUTO: 35.3 % (ref 20–45)
MCH RBC QN AUTO: 29.6 PG (ref 27–31)
MCHC RBC AUTO-ENTMCNC: 34.6 G/DL (ref 33–37)
MCV RBC AUTO: 85.6 FL (ref 81–99)
MONOCYTES # BLD AUTO: 0.29 10*3/UL (ref 0.2–1.2)
MONOCYTES NFR BLD MANUAL: 5.6 % (ref 3–10)
NEUTROPHILS # BLD AUTO: 2.98 10*3/UL (ref 2–8)
NEUTROPHILS NFR BLD MANUAL: 57.7 % (ref 30–85)
OSMOLALITY SERPL CALC.SUM OF ELEC: 290 MOSM/KG (ref 273–304)
PLATELET # BLD AUTO: 228 10*3/UL (ref 130–400)
PMV BLD AUTO: 9.8 FL (ref 7.4–10.4)
POTASSIUM SERPL-SCNC: 3.7 MMOL/L (ref 3.5–5.3)
RBC MORPH BLD: 4.39 10*6/UL (ref 4.2–5.4)
SODIUM SERPL-SCNC: 139 MMOL/L (ref 135–147)
WBC # BLD AUTO: 5.16 10*3/UL (ref 4.8–10.8)

## 2021-05-04 ENCOUNTER — OFFICE VISIT CONVERTED (OUTPATIENT)
Dept: ONCOLOGY | Facility: HOSPITAL | Age: 46
End: 2021-05-04
Attending: NURSE PRACTITIONER

## 2021-05-10 NOTE — H&P
History and Physical      Patient Name: Solange Srivastava   Patient ID: [id number]   Sex: Female   YOB: 1975    Primary Care Provider: Sis Lomax NP   Referring Provider: David Brown MD    Visit Date: April 17, 2020    Provider: Renzo Hackett MD   Location: [facility]   Location Address: [address] [place] [address]  [id number]   Location Phone: [phone]          Chief Complaint  · Left Elbow Pain  · Left Hand Pain, Numbness, Tingling       History Of Present Illness  Solange Srivastava is a 45 year old /White female who presents today to [facility].      She's complaining of left elbow pain and carpal tunnel syndrome symptoms in hand. She's got numbness and tingling in the left hand that's been going off and on for several months or longer. It's gotten worse recently. Her elbow is hurting about the tennis elbow. She has a history of a catastrophic work injury where she lost her right arm at the mid forearm and lost her left thumb.       Past Medical History  Anxiety; Arthritis; Breast cancer; Cold sore; Colitis; Depression; Fatty liver; Night sweats; Seasonal allergies; Sinus trouble; Sprain: Ankle, right         Past Surgical History  Abdominoplasty; Breast biopsy, both breasts; Breast reconstruction with tissue expander; Fat grafting; Hand surgery; Hysterectomy; Mastectomy, bilateral; Nipple reconstruction; Thyroid surgery         Medication List  bilberry 100 mg oral capsule; omeprazole 20 mg oral capsule,delayed release(DR/EC); venlafaxine 37.5 mg oral tablet; Vitamin D3 1,000 unit oral capsule; vitamin E 100 unit oral capsule         Allergy List  * Other; Azo; SULFA (SULFONAMIDES)         Family Medical History  Breast Neoplasm, Malignant; Stroke; Diabetes, unspecified type; Prostate cancer; Melanoma         Social History  Alcohol (Never); Caffeine (Current every day); Claustophobic (Unknown); lives with children; lives with spouse; ; Recreational Drug  "Use (Never); Second hand smoke exposure (Current some day); Tobacco (Former); Vapes (Current every day)         Review of Systems  · Constitutional  o Denies  o : fever, chills, weight loss  · Cardiovascular  o Denies  o : chest pain, shortness of breath  · Gastrointestinal  o Denies  o : liver disease, heartburn, nausea, blood in stools  · Genitourinary  o Denies  o : painful urination, blood in urine  · Integument  o Denies  o : rash, itching  · Neurologic  o Denies  o : headache, weakness, loss of consciousness  · Musculoskeletal  o Denies  o : painful, swollen joints  · Psychiatric  o Denies  o : drug/alcohol addiction, anxiety, depression      Vitals  Date Time BP Position Site L\R Cuff Size HR RR TEMP (F) WT  HT  BMI kg/m2 BSA m2 O2 Sat        04/17/2020 02:07 PM      83 - R   144lbs 0oz 5'  1\" 27.21 1.68 98 %          Physical Examination  · Constitutional  o Appearance  o : well developed, well-nourished, no obvious deformities present  · Head and Face  o Head  o :   § Inspection  § : normocephalic  o Face  o :   § Inspection  § : no facial lesions  · Eyes  o Conjunctivae  o : conjunctivae normal  o Sclerae  o : sclerae white  · Ears, Nose, Mouth and Throat  o Ears  o :   § External Ears  § : appearance within normal limits  § Hearing  § : intact  o Nose  o :   § External Nose  § : appearance normal  · Neck  o Inspection/Palpation  o : normal appearance  o Range of Motion  o : full range of motion  · Respiratory  o Respiratory Effort  o : breathing unlabored  o Inspection of Chest  o : normal appearance  o Auscultation of Lungs  o : no audible wheezing or rales  · Cardiovascular  o Heart  o : regular rate  · Gastrointestinal  o Abdominal Examination  o : soft and non-tender  · Skin and Subcutaneous Tissue  o General Inspection  o : intact, no rashes  · Psychiatric  o General  o : Alert and oriented x3  o Judgement and Insight  o : judgment and insight intact  o Mood and Affect  o : mood normal, affect " appropriate  · Left Arm  o Inspection  o : Well-healed thumb. Full range of motion of elbow. Tenderness over ECRB. Positive Tinel's at wrist. Positive Median Nerve Compression at wrist.   · Injection Note/Aspiration Note  o Site  o : left elbow   o Procedure  o : Procedure: After educating the patient, patient gave consent for procedure. After using Chloraprep, the joint space was injected. The patient tolerated the procedure well.   o Medication  o : 80 mg of DepoMedrol with 1cc of 1% Lidocaine  · In Office Procedures  o View  o : AP/LATERAL  o Site  o : left, elbow   o Indication  o : Left elbow pain  o Study  o : X-rays ordered, taken in the office, and reviewed today.  o Xray  o : negative fracture or dislocation  o Comparative Data  o : No comparative data found          Assessment  · Carpal tunnel syndrome of left wrist     354.0/G56.02  · Lateral epicondylitis of left elbow     726.32/M77.12  · Left elbow pain     719.42/M25.522      Plan  · Orders  o Depo-Medrol injection 80mg () - - 04/17/2020   Lot [id number] Exp 05 2021 [employer] Administered by SAEED Hackett MD  o Elbow-Lat Single Tendon (Injection) (30312) - - 04/17/2020   Lot [id number] Exp 07 01 2021 [employer] Administered by SAEED Hackett MD  o Elbow (Left) 2 views X-Ray [facility] (72118-[id number]) - 719.42/M25.522 - 04/17/2020  · Medications  o Medications have been Reconciled  o Transition of Care or Provider Policy  · Instructions  o Reviewed the patient's Past Medical, Social, and Family history as well as the ROS at today's visit, no changes.  o Call or return if worsening symptoms.  o Exercise handout given.  o This note was transcribed by Bri Vicente. forrest  o I injected her left elbow. I gave her a carpal tunnel brace and home exercises for both. We are going to get an EMG/NCS. Follow up with me after that.  o Electronically Identified Patient Education Materials Provided Electronically            Electronically Signed by: Charlee Linton PA-C -Author on  May 4, 2020 12:49:53 PM  Electronically Co-signed by: Renzo Hackett MD -Reviewer on May 6, 2020 10:05:07 AM

## 2021-05-10 NOTE — H&P
History and Physical      Patient Name: Solange Srivastava   Patient ID: [id number]   Sex: Female   YOB: 1975    Primary Care Provider: Sis Lomax NP   Referring Provider: David Brown MD    Visit Date: May 18, 2020    Provider: Libia La MD   Location: [facility]   Location Address: [address] [place] [address]  [id number]   Location Phone: [phone]          Chief Complaint  · Follow Up Surgery      History Of Present Illness  Solange Srivastava is a 45 year old /White female who is here today for follow up of: Laparoscopic Appendectomy.   She is doing well-status post surgery. Patholgoy was acute appendicitis and serositis.       Past Medical History  Anxiety; Arthritis; Breast cancer; Cold sore; Colitis; Depression; Fatty liver; Night sweats; Seasonal allergies; Sinus trouble; Sprain: Ankle, right         Past Surgical History  Abdominoplasty; Appendectomy; Breast biopsy, both breasts; Breast reconstruction with tissue expander; Fat grafting; Hand surgery; Hysterectomy; Mastectomy, bilateral; Nipple reconstruction; Thyroid surgery         Medication List  bilberry 100 mg oral capsule; omeprazole 20 mg oral capsule,delayed release(DR/EC); venlafaxine 37.5 mg oral tablet; Vitamin D3 1,000 unit oral capsule; vitamin E 100 unit oral capsule         Allergy List  * Other; Azo; SULFA (SULFONAMIDES)       Allergies Reconciled  Family Medical History  Breast Neoplasm, Malignant; Stroke; Diabetes, unspecified type; Prostate cancer; Melanoma         Social History  Alcohol (Never); Caffeine (Current every day); Claustophobic (Unknown); lives with children; lives with spouse; ; Recreational Drug Use (Never); Second hand smoke exposure (Current some day); Tobacco (Former); Vapes (Current every day)         Review of Systems  · Constitutional  o Denies  o : fever, chills  · Eyes  o Denies  o : yellowish discoloration of the eyes, eye pain  · HENT  o Denies  o :  "difficulty swallowing, hoarseness  · Cardiovascular  o Denies  o : chest pain on exertion, irregular heart beats  · Respiratory  o Denies  o : shortness of breath, cough  · Gastrointestinal  o Denies  o : nausea, vomiting, diarrhea, constipation  · Integument  o Admits  o : see HPI for surgical incision healing  · Neurologic  o Denies  o : tingling or numbness, loss of balance  · Musculoskeletal  o Denies  o : joint pain, back pain  · Endocrine  o Denies  o : weight gain, weight loss  · All Others Negative      Vitals  Date Time BP Position Site L\R Cuff Size HR RR TEMP (F) WT  HT  BMI kg/m2 BSA m2 O2 Sat HC       05/18/2020 10:21 AM       12  144lbs 0oz 5'  1\" 27.21 1.68           Physical Examination  · Constitutional  o Appearance  o : well developed/well nourished patient in no apparent distress  · Respiratory  o Inspection of Chest  o : equal breaths bilaterally  · Gastrointestinal  o Abdominal Examination  o : soft/nontender, nondistended, no organomegaly appreciated  · Post Surgical Incision  o Surgical wound  o : healing well, no erythema          Assessment  · Postoperative Exam Following Surgery     V67.00/Z09      Plan  · Medications  o Medications have been Reconciled  o Transition of Care or Provider Policy  · Instructions  o Return to office with any issues.  o F/U PRN            Electronically Signed by: Libia La MD -Author on May 18, 2020 10:26:05 AM  "

## 2021-05-13 NOTE — PROGRESS NOTES
Progress Note      Patient Name: Solange Srivastava   Patient ID: [id number]   Sex: Female   YOB: 1975    Primary Care Provider: Sis Lomax NP   Referring Provider: David Brown MD    Visit Date: May 22, 2020    Provider: Melissa Kurtz MD   Location: [facility]   Location Address: [address] [place] [address]  [id number]   Location Phone: [phone]          History Of Present Illness  Solange Srivastava is a 45 year old /White female.      Patient here today for concerns with left breast. redness and itching have improved since taking abx       Past Medical History  Anxiety; Arthritis; Breast cancer; Cold sore; Colitis; Depression; Fatty liver; Night sweats; Seasonal allergies; Sinus trouble; Sprain: Ankle, right         Past Surgical History  Abdominoplasty; Appendectomy; Breast biopsy, both breasts; Breast reconstruction with tissue expander; Fat grafting; Hand surgery; Hysterectomy; Mastectomy, bilateral; Nipple reconstruction; Thyroid surgery         Medication List  bilberry 100 mg oral capsule; omeprazole 20 mg oral capsule,delayed release(DR/EC); venlafaxine 37.5 mg oral tablet; Vitamin D3 1,000 unit oral capsule; vitamin E 100 unit oral capsule         Allergy List  * Other; Azo; SULFA (SULFONAMIDES)         Family Medical History  Breast Neoplasm, Malignant; Stroke; Diabetes, unspecified type; Prostate cancer; Melanoma         Social History  Alcohol (Never); Caffeine (Current every day); Claustophobic (Unknown); lives with children; lives with spouse; ; Recreational Drug Use (Never); Second hand smoke exposure (Current some day); Tobacco (Former); Vapes (Current every day)         Vitals  Date Time BP Position Site L\R Cuff Size HR RR TEMP (F) WT  HT  BMI kg/m2 BSA m2 O2 Sat HC       05/22/2020 01:08 /80 Sitting    74 - R  98.4     100 %          Physical Examination  · Constitutional  o Appearance  o : well developed,  well-nourished, Alert and oriented X3  · Eyes  o Sclerae  o : sclerae white  · Respiratory  o Respiratory Effort  o : breathing unlabored   · Cardiovascular  o Heart  o : regular rate  · Breasts  o FEMALE BREAST EXAM  o : incision well healed, implants in good position, nipple reconstruction healthy, nipple tattoo with good uptake; left breast with very mild swelling compared to right, no erythema on exam, improved swelling today  · Lymphatic  o Axilla  o : No lymphadenopathy present  · Skin and Subcutaneous Tissue  o General Inspection  o : no lesions present, no areas of discoloration, skin turgor normal, texture normal  · Psychiatric  o Judgement and Insight  o : judgment and insight intact  o Mood and Affect  o : mood normal, affect appropriate          Assessment  · Breast cancer, female     174.9/C50.919      Plan  · Orders  o Plastics reconstructive visit ([facility]REC) - - 05/22/2020  · Medications  o Medications have been Reconciled  o Transition of Care or Provider Policy  · Instructions  o improved, Patient to continue taking antibiotics and will follow up in clinic next Friday. She will call the office if her symptoms worsen.  o Electronically Identified Patient Education Materials Provided Electronically            Electronically Signed by: Melissa Kurtz MD -Author on May 22, 2020 01:58:04 PM

## 2021-05-13 NOTE — PROGRESS NOTES
Progress Note      Patient Name: Solange Srivastava   Patient ID: [id number]   Sex: Female   YOB: 1975    Primary Care Provider: Sis Lomax NP   Referring Provider: David Brown MD    Visit Date: July 2, 2020    Provider: Melissa Kurtz MD   Location: [facility]   Location Address: [address] [place] [address]  [id number]   Location Phone: [phone]          History Of Present Illness  Solange Srivastava is a 45 year old /White female.      Patient here today to follow up on concerns with left breast. redness and itching have resolved since taking abx. No further problems with breast and is happy with results.       Past Medical History  Anxiety; Arthritis; Breast cancer; Cold sore; Colitis; Depression; Fatty liver; Night sweats; Seasonal allergies; Sinus trouble; Sprain: Ankle, right         Past Surgical History  Abdominoplasty; Appendectomy; Breast biopsy, both breasts; Breast reconstruction with tissue expander; Fat grafting; Hand surgery; Hysterectomy; Mastectomy, bilateral; Nipple reconstruction; Thyroid surgery         Medication List  bilberry 100 mg oral capsule; Florajen3 460 mg (7.5-6- 1.5 bill. cell) oral capsule; omeprazole 20 mg oral capsule,delayed release(DR/EC); venlafaxine 37.5 mg oral tablet; Vitamin D3 1,000 unit oral capsule; vitamin E 100 unit oral capsule         Allergy List  * Other; Azo; SULFA (SULFONAMIDES)       Allergies Reconciled  Family Medical History  Breast Neoplasm, Malignant; Stroke; Diabetes, unspecified type; Prostate cancer; Melanoma         Social History  Alcohol (Never); Caffeine (Current every day); Claustophobic (Unknown); lives with children; lives with spouse; ; Recreational Drug Use (Never); Second hand smoke exposure (Current some day); Tobacco (Former); Vapes (Current every day)         Vitals  Date Time BP Position Site L\R Cuff Size HR RR TEMP (F) WT  HT  BMI kg/m2 BSA m2 O2 Sat HC      "  07/02/2020 11:29 /69 Sitting    68 - R  98.2  5'  1\"   99 %          Physical Examination  · Constitutional  o Appearance  o : well developed, well-nourished, Alert and oriented X3  · Eyes  o Sclerae  o : sclerae white  · Respiratory  o Respiratory Effort  o : breathing unlabored   · Cardiovascular  o Heart  o : regular rate  · Breasts  o FEMALE BREAST EXAM  o : incision well healed, implants in good position, nipple reconstruction healthy, nipple tattoo with good uptake; no erythema on exam, no tenderness  · Lymphatic  o Axilla  o : No lymphadenopathy present  · Skin and Subcutaneous Tissue  o General Inspection  o : no lesions present, no areas of discoloration, skin turgor normal, texture normal  · Psychiatric  o Judgement and Insight  o : judgment and insight intact  o Mood and Affect  o : mood normal, affect appropriate              Assessment  · Breast cancer, female     174.9/C50.919      Plan  · Orders  o Plastics reconstructive visit ([facility]REC) - - 07/02/2020  · Medications  o Medications have been Reconciled  o Transition of Care or Provider Policy  · Instructions  o symptoms resolved for now, RTC 6 months. She will call the office if her symptoms worsen.            Electronically Signed by: Melissa Kurtz MD -Author on July 2, 2020 12:16:06 PM  "

## 2021-05-13 NOTE — PROGRESS NOTES
Progress Note      Patient Name: Solange Srivastava   Patient ID: [id number]   Sex: Female   YOB: 1975    Primary Care Provider: Sis Lomax NP   Referring Provider: David Brown MD    Visit Date: May 20, 2020    Provider: Melissa Kurtz MD   Location: [facility]   Location Address: [address] [place] [address]  [id number]   Location Phone: [phone]          History Of Present Illness  Solange Srivastava is a 45 year old /White female who presents to the office today as a consult from David Brown MD for a nipple tattoo follow up. very pleased with results      Patient here today for concerns with left breast. She is status post laparoscopic appendectomy. Has noted redness, itching and pain in the left breast noted in the last day or so. She did call the call service last night and was started on antibiotics at that time. States that she has started taking them and has noticed that the redness and itching are better but still present.       Past Medical History  Anxiety; Arthritis; Breast cancer; Cold sore; Colitis; Depression; Fatty liver; Night sweats; Seasonal allergies; Sinus trouble; Sprain: Ankle, right         Past Surgical History  Abdominoplasty; Appendectomy; Breast biopsy, both breasts; Breast reconstruction with tissue expander; Fat grafting; Hand surgery; Hysterectomy; Mastectomy, bilateral; Nipple reconstruction; Thyroid surgery         Medication List  bilberry 100 mg oral capsule; omeprazole 20 mg oral capsule,delayed release(DR/EC); venlafaxine 37.5 mg oral tablet; Vitamin D3 1,000 unit oral capsule; vitamin E 100 unit oral capsule         Allergy List  * Other; Azo; SULFA (SULFONAMIDES)         Family Medical History  Breast Neoplasm, Malignant; Stroke; Diabetes, unspecified type; Prostate cancer; Melanoma         Social History  Alcohol (Never); Caffeine (Current every day); Claustophobic (Unknown); lives with children; lives  "with spouse; ; Recreational Drug Use (Never); Second hand smoke exposure (Current some day); Tobacco (Former); Vapes (Current every day)         Vitals  Date Time BP Position Site L\R Cuff Size HR RR TEMP (F) WT  HT  BMI kg/m2 BSA m2 O2 Sat HC       05/20/2020 02:15 /80 Sitting    81 - R  98 144lbs 0oz 5'  1\" 27.21 1.68 97 %          Physical Examination  · Constitutional  o Appearance  o : well developed, well-nourished, Alert and oriented X3  · Eyes  o Sclerae  o : sclerae white  · Respiratory  o Respiratory Effort  o : breathing unlabored   · Cardiovascular  o Heart  o : regular rate  · Breasts  o FEMALE BREAST EXAM  o : incision well healed, implants in good position, nipple reconstruction healthy, nipple tattoo with good uptake; left breast with very mild swelling compared to right, no erythema on exam  · Lymphatic  o Axilla  o : No lymphadenopathy present  · Skin and Subcutaneous Tissue  o General Inspection  o : no lesions present, no areas of discoloration, skin turgor normal, texture normal  · Psychiatric  o Judgement and Insight  o : judgment and insight intact  o Mood and Affect  o : mood normal, affect appropriate          Assessment  · Breast cancer, female     174.9/C50.919      Plan  · Orders  o Plastics reconstructive visit ([facility]REC) - - 05/20/2020  · Medications  o Medications have been Reconciled  o Transition of Care or Provider Policy  · Instructions  o Patient to continue taking antibiotics and will follow up in clinic on Friday. She will call the office if her symptoms worsen.            Electronically Signed by: Melissa Kurtz MD -Author on May 20, 2020 04:32:54 PM  "

## 2021-05-13 NOTE — PROGRESS NOTES
Progress Note      Patient Name: Solange Srivastava   Patient ID: [id number]   Sex: Female   YOB: 1975    Primary Care Provider: Sis Lomax NP   Referring Provider: David Brown MD    Visit Date: June 11, 2020    Provider: JASSI Whitehead   Location: [facility]   Location Address: [address] [place] [address], [address]  [place], [place]  [id number]   Location Phone: [phone]          History Of Present Illness  Video Conferencing Visit  Solange Srivastava is a 45 year old /White female who is presenting for evaluation via video conferencing via zoom. Verbal consent obtained before beginning visit.   The following staff were present during this visit: Bria Heard MA      She presents for follow-up of Ying's esophagus, gastritis, hemorrhoids and CDH-1 related breast cancer.      She reports that reflux is controlled.      She underwent appendectomy recently.  She experienced post operative constipation that is now resolved.      She is taking probiotics.       Past Medical History  Anxiety; Arthritis; Breast cancer; Cold sore; Colitis; Depression; Fatty liver; Night sweats; Seasonal allergies; Sinus trouble; Sprain: Ankle, right         Past Surgical History  Abdominoplasty; Appendectomy; Breast biopsy, both breasts; Breast reconstruction with tissue expander; Fat grafting; Hand surgery; Hysterectomy; Mastectomy, bilateral; Nipple reconstruction; Thyroid surgery         Medication List  bilberry 100 mg oral capsule; omeprazole 20 mg oral capsule,delayed release(DR/EC); venlafaxine 37.5 mg oral tablet; Vitamin D3 1,000 unit oral capsule; vitamin E 100 unit oral capsule         Allergy List  * Other; Azo; SULFA (SULFONAMIDES)       Allergies Reconciled  Family Medical History  Breast Neoplasm, Malignant; Stroke; Diabetes, unspecified type; Prostate cancer; Melanoma         Social History  Alcohol (Never); Caffeine (Current every day); Claustophobic (Unknown); lives with  "children; lives with spouse; ; Recreational Drug Use (Never); Second hand smoke exposure (Current some day); Tobacco (Former); Vapes (Current every day)         Review of Systems  · Constitutional  o Denies  o : chills, fever  · Cardiovascular  o Denies  o : chest pain, irregular heart beats  · Respiratory  o Denies  o : cough, shortness of breath  · Gastrointestinal  o Admits  o : see HPI   · Endocrine  o Denies  o : weight gain, weight loss      Vitals  Date Time BP Position Site L\R Cuff Size HR RR TEMP (F) WT  HT  BMI kg/m2 BSA m2 O2 Sat HC       06/11/2020 02:50 PM         140lbs 0oz 5'  1\" 26.45 1.65           Physical Examination  · Constitutional  o Appearance  o : Healthy-appearing, awake and alert in no acute distress  · Head and Face  o Head  o : Normocephalic with no worriesome skin lesions  · Eyes  o Vision  o :   § Visual Fields  § : eyes move symmetrical in all directions  o Sclerae  o : sclerae anicteric  o Pupils and Irises  o : pupils equal and symmetrical  · Neck  o Inspection/Palpation  o : normal appearance, trachea midline  · Respiratory  o Respiratory Effort  o : Breathing is unlabored.  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 cell) oral capsule   SIG: take 1 capsule by oral route daily for 30 days   DISP: (30) capsules with 5 refills  Prescribed on 06/11/2020     o omeprazole 20 mg oral capsule,delayed release(DR/EC)   SIG: take 1 capsule (20 mg) by oral " route once daily before a meal   DISP: (30) capsules with 5 refills  Refilled on 06/11/2020     o Medications have been Reconciled  · Instructions  o Information given on current diagnoses. Due for repeat EGD in October.  · Disposition  o Follow up 6 months            Electronically Signed by: JASSI Whitehead -Author on June 12, 2020 11:41:31 AM

## 2021-05-13 NOTE — PROGRESS NOTES
"   Progress Note      Patient Name: Solange Srivastava   Patient ID: [id number]   Sex: Female   YOB: 1975    Primary Care Provider: Sis Lomax NP   Referring Provider: David Brown MD    Visit Date: May 29, 2020    Provider: Melissa Kurtz MD   Location: [facility]   Location Address: [address] [place] [address]  [id number]   Location Phone: [phone]          History Of Present Illness  Solange Srivastava is a 45 year old /White female.      Patient here today to follow up on concerns with left breast. redness and itching have resolved since taking abx       Past Medical History  Anxiety; Arthritis; Breast cancer; Cold sore; Colitis; Depression; Fatty liver; Night sweats; Seasonal allergies; Sinus trouble; Sprain: Ankle, right         Past Surgical History  Abdominoplasty; Appendectomy; Breast biopsy, both breasts; Breast reconstruction with tissue expander; Fat grafting; Hand surgery; Hysterectomy; Mastectomy, bilateral; Nipple reconstruction; Thyroid surgery         Medication List  bilberry 100 mg oral capsule; omeprazole 20 mg oral capsule,delayed release(DR/EC); venlafaxine 37.5 mg oral tablet; Vitamin D3 1,000 unit oral capsule; vitamin E 100 unit oral capsule         Allergy List  * Other; Azo; SULFA (SULFONAMIDES)       Allergies Reconciled  Family Medical History  Breast Neoplasm, Malignant; Stroke; Diabetes, unspecified type; Prostate cancer; Melanoma         Social History  Alcohol (Never); Caffeine (Current every day); Claustophobic (Unknown); lives with children; lives with spouse; ; Recreational Drug Use (Never); Second hand smoke exposure (Current some day); Tobacco (Former); Vapes (Current every day)         Vitals  Date Time BP Position Site L\R Cuff Size HR RR TEMP (F) WT  HT  BMI kg/m2 BSA m2 O2 Sat HC       05/29/2020 01:16 /72 Sitting    95 - R  98.1  5'  1\"   95 %          Physical " Examination  · Constitutional  o Appearance  o : well developed, well-nourished, Alert and oriented X3  · Eyes  o Sclerae  o : sclerae white  · Respiratory  o Respiratory Effort  o : breathing unlabored   · Cardiovascular  o Heart  o : regular rate  · Breasts  o FEMALE BREAST EXAM  o : incision well healed, implants in good position, nipple reconstruction healthy, nipple tattoo with good uptake; no erythema on exam, no tenderness  · Lymphatic  o Axilla  o : No lymphadenopathy present  · Skin and Subcutaneous Tissue  o General Inspection  o : no lesions present, no areas of discoloration, skin turgor normal, texture normal  · Psychiatric  o Judgement and Insight  o : judgment and insight intact  o Mood and Affect  o : mood normal, affect appropriate          Assessment  · Breast cancer, female     174.9/C50.919      Plan  · Orders  o Plastics reconstructive visit ([facility]REC) - - 05/29/2020  · Medications  o Medications have been Reconciled  o Transition of Care or Provider Policy  · Instructions  o symptoms resolved for now, Patient to finish taking antibiotics and will follow up in clinic one month. She will call the office if her symptoms worsen.            Electronically Signed by: Melissa Kurtz MD -Author on May 29, 2020 03:56:13 PM

## 2021-05-14 VITALS
TEMPERATURE: 97.3 F | HEART RATE: 101 BPM | WEIGHT: 143 LBS | DIASTOLIC BLOOD PRESSURE: 48 MMHG | HEIGHT: 61 IN | SYSTOLIC BLOOD PRESSURE: 92 MMHG | BODY MASS INDEX: 27 KG/M2

## 2021-05-14 VITALS
DIASTOLIC BLOOD PRESSURE: 67 MMHG | HEART RATE: 98 BPM | SYSTOLIC BLOOD PRESSURE: 109 MMHG | TEMPERATURE: 97.1 F | HEIGHT: 61 IN | WEIGHT: 144.12 LBS | BODY MASS INDEX: 27.21 KG/M2

## 2021-05-14 NOTE — PROGRESS NOTES
Progress Note      Patient Name: Solange Srivastava   Patient ID: [id number]   Sex: Female   YOB: 1975    Primary Care Provider: Sis Lomax NP   Referring Provider: David Brown MD    Visit Date: February 4, 2021    Provider: JASSI Whitehead   Location: [facility]   Location Address: [address] [place] [address], [address] [place] [address]  [id number]   Location Phone: [phone]          Chief Complaint  · Follow-up of EGD      History Of Present Illness     Ms. Srivastava presents for follow-up of Ying's esophagus.    1/27/2021 EGD-Schatzki's ring in the GE junction.  Irregularity in the Z-line and GE junction.  Four-quadrant biopsies obtained, chronic inflammation, negative for intestinal metaplasia.  Small hiatal hernia.  Erythema in the antrum, biopsy-normal.  Normal duodenum.    Denies heartburn.  Admits an increase in belching.             Past Medical History  Anxiety; Arthritis; Breast cancer; Cold sore; Colitis; Depression; Fatty liver; Night sweats; Seasonal allergies; Sinus trouble; Sprain: Ankle, right         Past Surgical History  Abdominoplasty; Appendectomy; Breast biopsy, both breasts; Breast reconstruction with tissue expander; Fat grafting; Hand surgery; Hysterectomy; Mastectomy, bilateral; Nipple reconstruction; Thyroid surgery         Medication List  bilberry 100 mg oral capsule; Florajen3 460 mg (7.5-6- 1.5 bill. cell) oral capsule; omeprazole 20 mg oral capsule,delayed release(DR/EC); venlafaxine 37.5 mg oral tablet; Vitamin D3 1,000 unit oral capsule; vitamin E 100 unit oral capsule         Allergy List  Azo; SULFA (SULFONAMIDES)       Allergies Reconciled  Family Medical History  Breast Neoplasm, Malignant; Stroke; Diabetes, unspecified type; Prostate cancer; Melanoma         Social History  Alcohol (Never); Caffeine (Current every day); Claustophobic (Unknown); lives with children; lives with spouse; ; Recreational Drug Use (Never); Second  "hand smoke exposure (Current some day); Tobacco (Former); Vapes (Current every day)         Review of Systems  · Constitutional  o Denies  o : chills, fever  · Cardiovascular  o Denies  o : chest pain, irregular heart beats  · Respiratory  o Denies  o : cough, shortness of breath  · Gastrointestinal  o Admits  o : see HPI   · Endocrine  o Denies  o : weight gain, weight loss      Vitals  Date Time BP Position Site L\R Cuff Size HR RR TEMP (F) WT  HT  BMI kg/m2 BSA m2 O2 Sat FR L/min FiO2 HC       02/04/2021 02:21 /67 Sitting    98 - R  97.1 144lbs 2oz 5'  1\" 27.23 1.68             Physical Examination  · Constitutional  o Appearance  o : Healthy-appearing, awake and alert in no acute distress  · Head and Face  o Head  o : Normocephalic with no worriesome skin lesions  · Eyes  o Vision  o :   § Visual Fields  § : eyes move symmetrical in all directions  o Sclerae  o : sclerae anicteric  o Pupils and Irises  o : pupils equal and symmetrical  · Neck  o Inspection/Palpation  o : Trachea is midline, no adenopathy  · Respiratory  o Respiratory Effort  o : Breathing is unlabored.  o Inspection of Chest  o : normal appearance  o Auscultation of Lungs  o : Chest is clear to auscultation bilaterally.  · Cardiovascular  o Heart  o :   § Auscultation of Heart  § : no murmurs, rubs, or gallops  o Peripheral Vascular System  o :   § Extremities  § : no cyanosis, clubbing or edema;   · Gastrointestinal  o Abdominal Examination  o : Abdomen is soft, nontender to palpation, with normal active bowel sounds, no appreciable hepatosplenomegaly.  o Digital Rectal Exam  o : deferred  · Skin and Subcutaneous Tissue  o General Inspection  o : without focal lesions; turgor is normal  · Psychiatric  o General  o : Alert and oriented x3  o Mood and Affect  o : Mood and affect are appropriate to circumstances  · Extremities  o Extremities  o : No edema, no cyanosis          Assessment  · Ying's esophagus without " dysplasia     530.85/K22.70      Plan  · Medications  o Florajen3 460 mg (7.5-6- 1.5 bill. cell) oral capsule   SIG: take 1 capsule by oral route daily for 90 days   DISP: (90) Capsule with 3 refills  Refilled on 02/04/2021     o omeprazole 20 mg oral capsule,delayed release(DR/EC)   SIG: take 1 capsule (20 mg) by oral route once daily before a meal for 90 days   DISP: (90) Capsule with 4 refills  Refilled on 02/04/2021     o Medications have been Reconciled  o Transition of Care or Provider Policy  · Instructions  o Information given on current diagnoses.  · Disposition  o Follow up 1 year            Electronically Signed by: JASSI Whitehead -Author on February 5, 2021 11:52:21 AM

## 2021-05-14 NOTE — PROGRESS NOTES
Progress Note      Patient Name: Solange Srivastava   Patient ID: [id number]   Sex: Female   YOB: 1975    Primary Care Provider: Sis Lomax NP   Referring Provider: David Brown MD    Visit Date: January 14, 2021    Provider: JASSI Whitehead   Location: [facility]   Location Address: [address] [place] [address], [address] [place] [address]  [id number]   Location Phone: [phone]          Chief Complaint     f/u monk's       History Of Present Illness     Ms. Srivastava is a 46 y/o female with PMH of CDH-1 breast cancer.      She presents for f/u of monk's and gastritis.  Scheduled for EGD 1/27 to f/u [facility].      She had COVID in December.  Tested at the [facility].     Denies heartburn and dysphagia.      Reports a regular bowel pattern.  Denies abdominal pain.                 Past Medical History  Anxiety; Arthritis; Breast cancer; Cold sore; Colitis; Depression; Fatty liver; Night sweats; Seasonal allergies; Sinus trouble; Sprain: Ankle, right         Past Surgical History  Abdominoplasty; Appendectomy; Breast biopsy, both breasts; Breast reconstruction with tissue expander; Fat grafting; Hand surgery; Hysterectomy; Mastectomy, bilateral; Nipple reconstruction; Thyroid surgery         Medication List  bilberry 100 mg oral capsule; Florajen3 460 mg (7.5-6- 1.5 bill. cell) oral capsule; omeprazole 20 mg oral capsule,delayed release(DR/EC); venlafaxine 37.5 mg oral tablet; Vitamin D3 1,000 unit oral capsule; vitamin E 100 unit oral capsule         Allergy List  * Other; Azo; SULFA (SULFONAMIDES)       Allergies Reconciled  Family Medical History  Breast Neoplasm, Malignant; Stroke; Diabetes, unspecified type; Prostate cancer; Melanoma         Social History  Alcohol (Never); Caffeine (Current every day); Claustophobic (Unknown); lives with children; lives with spouse; ; Recreational Drug Use (Never); Second hand smoke exposure (Current some day);  "Tobacco (Former); Vapes (Current every day)         Review of Systems  · Constitutional  o Denies  o : chills, fever  · Cardiovascular  o Denies  o : chest pain, irregular heart beats  · Respiratory  o Denies  o : cough, shortness of breath  · Gastrointestinal  o Admits  o : see HPI   · Endocrine  o Denies  o : weight gain, weight loss      Vitals  Date Time BP Position Site L\R Cuff Size HR RR TEMP (F) WT  HT  BMI kg/m2 BSA m2 O2 Sat FR L/min FiO2        01/14/2021 09:57 AM 92/48 Sitting    101 - R  97.3 143lbs 0oz 5'  1.5\" 26.58 1.68             Physical Examination  · Constitutional  o Appearance  o : Healthy-appearing, awake and alert in no acute distress  · Head and Face  o Head  o : Normocephalic with no worriesome skin lesions  · Eyes  o Vision  o :   § Visual Fields  § : eyes move symmetrical in all directions  o Sclerae  o : sclerae anicteric  o Pupils and Irises  o : pupils equal and symmetrical  · Neck  o Inspection/Palpation  o : Trachea is midline, no adenopathy  · Respiratory  o Respiratory Effort  o : Breathing is unlabored.  o Inspection of Chest  o : normal appearance  o Auscultation of Lungs  o : Chest is clear to auscultation bilaterally.  · Cardiovascular  o Heart  o :   § Auscultation of Heart  § : no murmurs, rubs, or gallops  o Peripheral Vascular System  o :   § Extremities  § : no cyanosis, clubbing or edema;   · Gastrointestinal  o Abdominal Examination  o : Abdomen is soft, nontender to palpation, with normal active bowel sounds, no appreciable hepatosplenomegaly.  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 cell) oral capsule   SIG: take 1 capsule by oral route daily for 90 days   DISP: (90) Capsule with 1 refills  Refilled on 01/14/2021     o Medications have been Reconciled  · Instructions  o Information given on current diagnoses.  · Disposition  o Follow Up after procedure            Electronically Signed by: JASSI Whitehead -Author on January 14, 2021 02:04:52 PM

## 2021-05-15 VITALS
HEIGHT: 61 IN | HEART RATE: 68 BPM | DIASTOLIC BLOOD PRESSURE: 69 MMHG | TEMPERATURE: 98.2 F | BODY MASS INDEX: 26.46 KG/M2 | SYSTOLIC BLOOD PRESSURE: 116 MMHG | OXYGEN SATURATION: 99 %

## 2021-05-15 VITALS
SYSTOLIC BLOOD PRESSURE: 119 MMHG | HEIGHT: 61 IN | BODY MASS INDEX: 26.5 KG/M2 | DIASTOLIC BLOOD PRESSURE: 80 MMHG | WEIGHT: 140.37 LBS

## 2021-05-15 VITALS
TEMPERATURE: 98 F | DIASTOLIC BLOOD PRESSURE: 80 MMHG | BODY MASS INDEX: 27.19 KG/M2 | HEART RATE: 81 BPM | SYSTOLIC BLOOD PRESSURE: 120 MMHG | WEIGHT: 144 LBS | OXYGEN SATURATION: 97 % | HEIGHT: 61 IN

## 2021-05-15 VITALS
BODY MASS INDEX: 26.46 KG/M2 | HEART RATE: 64 BPM | OXYGEN SATURATION: 98 % | HEIGHT: 61 IN | SYSTOLIC BLOOD PRESSURE: 104 MMHG | DIASTOLIC BLOOD PRESSURE: 72 MMHG

## 2021-05-15 VITALS
BODY MASS INDEX: 26.26 KG/M2 | WEIGHT: 139.12 LBS | HEIGHT: 61 IN | OXYGEN SATURATION: 97 % | DIASTOLIC BLOOD PRESSURE: 71 MMHG | HEART RATE: 78 BPM | SYSTOLIC BLOOD PRESSURE: 103 MMHG

## 2021-05-15 VITALS
WEIGHT: 144.5 LBS | OXYGEN SATURATION: 98 % | DIASTOLIC BLOOD PRESSURE: 64 MMHG | SYSTOLIC BLOOD PRESSURE: 101 MMHG | BODY MASS INDEX: 27.28 KG/M2 | HEART RATE: 73 BPM | HEIGHT: 61 IN

## 2021-05-15 VITALS
HEART RATE: 86 BPM | DIASTOLIC BLOOD PRESSURE: 71 MMHG | HEIGHT: 62 IN | OXYGEN SATURATION: 100 % | WEIGHT: 145 LBS | BODY MASS INDEX: 26.68 KG/M2 | SYSTOLIC BLOOD PRESSURE: 105 MMHG

## 2021-05-15 VITALS — HEIGHT: 61 IN | WEIGHT: 140 LBS | BODY MASS INDEX: 26.43 KG/M2

## 2021-05-15 VITALS
BODY MASS INDEX: 27 KG/M2 | HEART RATE: 79 BPM | DIASTOLIC BLOOD PRESSURE: 70 MMHG | WEIGHT: 143 LBS | HEIGHT: 61 IN | OXYGEN SATURATION: 96 % | SYSTOLIC BLOOD PRESSURE: 103 MMHG

## 2021-05-15 VITALS
DIASTOLIC BLOOD PRESSURE: 80 MMHG | TEMPERATURE: 98.4 F | HEART RATE: 74 BPM | SYSTOLIC BLOOD PRESSURE: 124 MMHG | OXYGEN SATURATION: 100 %

## 2021-05-15 VITALS — BODY MASS INDEX: 27.19 KG/M2 | RESPIRATION RATE: 12 BRPM | HEIGHT: 61 IN | WEIGHT: 144 LBS

## 2021-05-15 VITALS
WEIGHT: 144 LBS | HEART RATE: 64 BPM | DIASTOLIC BLOOD PRESSURE: 76 MMHG | BODY MASS INDEX: 27.19 KG/M2 | SYSTOLIC BLOOD PRESSURE: 102 MMHG | OXYGEN SATURATION: 99 % | HEIGHT: 61 IN

## 2021-05-15 VITALS
SYSTOLIC BLOOD PRESSURE: 102 MMHG | OXYGEN SATURATION: 96 % | HEART RATE: 94 BPM | HEIGHT: 61 IN | DIASTOLIC BLOOD PRESSURE: 60 MMHG | BODY MASS INDEX: 27.21 KG/M2 | WEIGHT: 144.12 LBS

## 2021-05-15 VITALS
OXYGEN SATURATION: 96 % | BODY MASS INDEX: 27.07 KG/M2 | HEIGHT: 61 IN | DIASTOLIC BLOOD PRESSURE: 65 MMHG | HEART RATE: 89 BPM | SYSTOLIC BLOOD PRESSURE: 97 MMHG | WEIGHT: 143.37 LBS

## 2021-05-15 VITALS — OXYGEN SATURATION: 98 % | HEIGHT: 61 IN | HEART RATE: 83 BPM | BODY MASS INDEX: 27.19 KG/M2 | WEIGHT: 144 LBS

## 2021-05-15 VITALS
WEIGHT: 138.12 LBS | HEIGHT: 61 IN | DIASTOLIC BLOOD PRESSURE: 76 MMHG | BODY MASS INDEX: 26.08 KG/M2 | SYSTOLIC BLOOD PRESSURE: 108 MMHG

## 2021-05-15 VITALS
HEART RATE: 87 BPM | HEIGHT: 62 IN | OXYGEN SATURATION: 97 % | SYSTOLIC BLOOD PRESSURE: 107 MMHG | DIASTOLIC BLOOD PRESSURE: 73 MMHG | BODY MASS INDEX: 25.99 KG/M2 | WEIGHT: 141.25 LBS

## 2021-05-15 VITALS
TEMPERATURE: 98.1 F | HEART RATE: 95 BPM | BODY MASS INDEX: 26.46 KG/M2 | OXYGEN SATURATION: 95 % | HEIGHT: 61 IN | DIASTOLIC BLOOD PRESSURE: 72 MMHG | SYSTOLIC BLOOD PRESSURE: 107 MMHG

## 2021-05-15 VITALS
DIASTOLIC BLOOD PRESSURE: 78 MMHG | WEIGHT: 144 LBS | OXYGEN SATURATION: 97 % | HEIGHT: 61 IN | SYSTOLIC BLOOD PRESSURE: 117 MMHG | HEART RATE: 83 BPM | BODY MASS INDEX: 27.19 KG/M2

## 2021-05-15 VITALS
OXYGEN SATURATION: 98 % | HEIGHT: 61 IN | DIASTOLIC BLOOD PRESSURE: 74 MMHG | BODY MASS INDEX: 26.46 KG/M2 | SYSTOLIC BLOOD PRESSURE: 109 MMHG | HEART RATE: 108 BPM

## 2021-05-15 VITALS
HEIGHT: 62 IN | WEIGHT: 143 LBS | HEART RATE: 69 BPM | OXYGEN SATURATION: 100 % | BODY MASS INDEX: 26.31 KG/M2 | SYSTOLIC BLOOD PRESSURE: 108 MMHG | DIASTOLIC BLOOD PRESSURE: 83 MMHG

## 2021-05-16 VITALS
HEART RATE: 83 BPM | OXYGEN SATURATION: 95 % | DIASTOLIC BLOOD PRESSURE: 109 MMHG | HEIGHT: 62 IN | BODY MASS INDEX: 25.4 KG/M2 | SYSTOLIC BLOOD PRESSURE: 143 MMHG | WEIGHT: 138 LBS

## 2021-05-16 VITALS
BODY MASS INDEX: 25.4 KG/M2 | SYSTOLIC BLOOD PRESSURE: 136 MMHG | DIASTOLIC BLOOD PRESSURE: 97 MMHG | HEART RATE: 74 BPM | HEIGHT: 62 IN | OXYGEN SATURATION: 96 % | WEIGHT: 138 LBS

## 2021-05-16 VITALS — RESPIRATION RATE: 14 BRPM | BODY MASS INDEX: 27.19 KG/M2 | WEIGHT: 144 LBS | HEIGHT: 61 IN

## 2021-05-16 VITALS
WEIGHT: 138 LBS | OXYGEN SATURATION: 97 % | DIASTOLIC BLOOD PRESSURE: 94 MMHG | HEART RATE: 83 BPM | BODY MASS INDEX: 25.4 KG/M2 | SYSTOLIC BLOOD PRESSURE: 134 MMHG | HEIGHT: 62 IN

## 2021-05-16 VITALS
DIASTOLIC BLOOD PRESSURE: 70 MMHG | WEIGHT: 138 LBS | SYSTOLIC BLOOD PRESSURE: 102 MMHG | BODY MASS INDEX: 25.4 KG/M2 | OXYGEN SATURATION: 97 % | HEIGHT: 62 IN | HEART RATE: 103 BPM

## 2021-05-16 VITALS — BODY MASS INDEX: 27.19 KG/M2 | WEIGHT: 144 LBS | RESPIRATION RATE: 14 BRPM | HEIGHT: 61 IN

## 2021-05-16 VITALS
HEART RATE: 70 BPM | HEIGHT: 61 IN | DIASTOLIC BLOOD PRESSURE: 97 MMHG | OXYGEN SATURATION: 100 % | SYSTOLIC BLOOD PRESSURE: 149 MMHG | BODY MASS INDEX: 26.24 KG/M2 | WEIGHT: 139 LBS

## 2021-05-16 VITALS
HEART RATE: 74 BPM | WEIGHT: 140 LBS | OXYGEN SATURATION: 98 % | DIASTOLIC BLOOD PRESSURE: 69 MMHG | BODY MASS INDEX: 25.76 KG/M2 | HEIGHT: 62 IN | SYSTOLIC BLOOD PRESSURE: 99 MMHG

## 2021-05-16 VITALS — WEIGHT: 138.37 LBS | HEIGHT: 62 IN | BODY MASS INDEX: 25.46 KG/M2 | RESPIRATION RATE: 14 BRPM

## 2021-05-16 VITALS
HEIGHT: 62 IN | HEART RATE: 73 BPM | SYSTOLIC BLOOD PRESSURE: 145 MMHG | BODY MASS INDEX: 25.4 KG/M2 | OXYGEN SATURATION: 100 % | DIASTOLIC BLOOD PRESSURE: 99 MMHG | WEIGHT: 138 LBS

## 2021-05-16 VITALS — BODY MASS INDEX: 27 KG/M2 | OXYGEN SATURATION: 97 % | WEIGHT: 143 LBS | HEIGHT: 61 IN | HEART RATE: 80 BPM

## 2021-05-16 VITALS — RESPIRATION RATE: 16 BRPM | WEIGHT: 138 LBS | BODY MASS INDEX: 25.4 KG/M2 | HEIGHT: 62 IN

## 2021-05-16 VITALS
OXYGEN SATURATION: 99 % | HEART RATE: 94 BPM | DIASTOLIC BLOOD PRESSURE: 97 MMHG | SYSTOLIC BLOOD PRESSURE: 144 MMHG | BODY MASS INDEX: 25.4 KG/M2 | HEIGHT: 62 IN | WEIGHT: 138 LBS

## 2021-05-16 VITALS
OXYGEN SATURATION: 97 % | SYSTOLIC BLOOD PRESSURE: 110 MMHG | BODY MASS INDEX: 25.4 KG/M2 | HEIGHT: 62 IN | DIASTOLIC BLOOD PRESSURE: 77 MMHG | WEIGHT: 138 LBS | HEART RATE: 84 BPM

## 2021-05-16 VITALS
HEART RATE: 72 BPM | BODY MASS INDEX: 25.4 KG/M2 | OXYGEN SATURATION: 95 % | SYSTOLIC BLOOD PRESSURE: 147 MMHG | HEIGHT: 62 IN | WEIGHT: 138 LBS | DIASTOLIC BLOOD PRESSURE: 111 MMHG

## 2021-05-16 VITALS
WEIGHT: 138 LBS | HEIGHT: 62 IN | SYSTOLIC BLOOD PRESSURE: 152 MMHG | DIASTOLIC BLOOD PRESSURE: 103 MMHG | BODY MASS INDEX: 25.4 KG/M2 | OXYGEN SATURATION: 99 % | HEART RATE: 77 BPM

## 2021-05-16 VITALS — BODY MASS INDEX: 27.19 KG/M2 | HEIGHT: 61 IN | WEIGHT: 144 LBS | RESPIRATION RATE: 16 BRPM

## 2021-05-16 VITALS
HEIGHT: 62 IN | HEART RATE: 65 BPM | BODY MASS INDEX: 25.4 KG/M2 | SYSTOLIC BLOOD PRESSURE: 138 MMHG | WEIGHT: 138 LBS | OXYGEN SATURATION: 97 % | DIASTOLIC BLOOD PRESSURE: 103 MMHG

## 2021-05-16 VITALS — HEIGHT: 61 IN | HEART RATE: 66 BPM | WEIGHT: 144.37 LBS | OXYGEN SATURATION: 98 % | BODY MASS INDEX: 27.26 KG/M2

## 2021-05-22 ENCOUNTER — TRANSCRIBE ORDERS (OUTPATIENT)
Dept: ONCOLOGY | Facility: HOSPITAL | Age: 46
End: 2021-05-22

## 2021-05-22 DIAGNOSIS — Z78.0 POST-MENOPAUSAL: Primary | ICD-10-CM

## 2021-05-28 VITALS
SYSTOLIC BLOOD PRESSURE: 105 MMHG | DIASTOLIC BLOOD PRESSURE: 70 MMHG | BODY MASS INDEX: 27.28 KG/M2 | RESPIRATION RATE: 18 BRPM | WEIGHT: 144.4 LBS | HEART RATE: 83 BPM | TEMPERATURE: 97.6 F | OXYGEN SATURATION: 100 %

## 2021-05-28 VITALS
HEART RATE: 83 BPM | RESPIRATION RATE: 16 BRPM | WEIGHT: 147.05 LBS | TEMPERATURE: 97.2 F | SYSTOLIC BLOOD PRESSURE: 100 MMHG | OXYGEN SATURATION: 100 % | DIASTOLIC BLOOD PRESSURE: 49 MMHG | BODY MASS INDEX: 27.78 KG/M2

## 2021-05-28 NOTE — PROGRESS NOTES
Patient: SAMANTHA MCKENNA     Acct: [id number]     Report: #[id number]  UNIT #: [id number]     : 1975    Encounter Date:10/27/2020  PRIMARY CARE: STERLING JAMIL  ***Signed***  --------------------------------------------------------------------------------------------------------------------  NURSE INTAKE      Visit Type      Established Patient Visit            Chief Complaint      BREAST CANCER            History and Present Illness      Past Oncology Illness History      1) Breast Cancer: Invasive Ductal Carcinoma; LEFT breast, LIQ, diagnosed     18; Grade 2, ER: 100%, OR: 100%, HER2: 1+, Ki-67: 5% staged mpT1b pN0 M0     Stage IA, Oncotype DX: 14; two foci largest 6 mm; Genetic Testing: CDH1 (+)     (Variant of Uncertain Significance)            Treatment History:            1) s/p bilateral mastectomy and left SLND 18      2) Patient deferred adjuvant endocrine therapy due to concerns for recovery from    breast reconstruction      3) Femara started with ongoing reconstruction; DEXA ordered (19)      4) Bone scan: normal. 19      5) Tolerating Femara well. Osteopenia at L2, normal LS. 19      6) Left axillary pain: plan for diag. mammo/US to eval. (20).       7) bilateral axillary US: benign LN to the left axillary area. (20)            HPI - Oncology Interim      1) Breast Cancer: Invasive Ductal Carcinoma; LEFT breast, LIQ, diagnosed     18; Grade 2, ER: 100%, OR: 100%, HER2: 1+, Ki-67: 5% staged mpT1b pN0 M0     Stage IA, Oncotype DX: 14; two foci largest 6 mm; Genetic Testing: CDH1 (+)     (Variant of Uncertain Significance)            Mrs. Samantha Mckenna presents for 4 month follow up for invasive early stage left     breast cancer. She reports she is doing well in the interim. At her last visit,     on clinical breast exam she had been having left axillary pain. It was felt to     be related to use of her dominant hand since she has a missing right hand due to    a  previous work accident.            She is 2 years out now from bilateral mastectomies with breast reconstruction.     She has been taking Letrozole and tolerating fairly well.            Clinical Trial Participant      No            ECOG Performance Status      0            Most Recent Imaging Findings      [facility]                PACS RADIOLOGY REPORT            Patient: SAMANTHA MCKENNA   Acct: #[id number]   Report: #[id number]            UNIT #: [id number]    DOS: 20       INSURANCE:MEDICARE PART A   LOCATION:MAMMO     : 1975            PROVIDERS      ADMITTING:     ATTENDING: TYLOR ROMO      FAMILY:  DEJAN HAYWOOD   ORDERING:  TYLOR ROMO         OTHER:    DICTATING:  Maria Fernanda Barker MD            REQ #:[id number]   EXAM:AXILLARYL - US AXILLARY UNILATERAL LEFT      REASON FOR EXAM:  LT BREAST PAIN      REASON FOR VISIT:  LT BREAST PAIN            *******Signed******         PROCEDURE:   US AXILLARY UNILATERAL LEFT             COMPARISON:   [facility], MG, DIG SCREENING BILAT NOREEN W 3D    TRUMAN, 2018,       12:50.  [facility], MR, BREAST BILATERAL W/WO CONTRAST, 2018,    14:28.        [facility], CT, CHEST W/ CONTRAST, 2018, 10:35.             INDICATIONS:   45-year-old woman with history of left breast cancer in 2018     status post mastectomy.        She reports that sampled left axillary lymph nodes were benign.  She complains     of left axillary       pain radiating to the left arm.             FINDINGS:   No sonographic abnormality is seen in the left axilla.  There is a     benign appearing left       axillary lymph node measuring up to 1.9 cm with preservation of the normal fatty    hilum and 2 mm       cortical thickness.             CONCLUSION:   Sonographically benign appearing left axillary lymph node.  No     sonographic  abnormality       is seen in the left axilla to account for the patient's symptom of left axillary    pain radiating       into the left arm.  Recommend management of this symptom based on findings at     clinical examination.        I discussed the result with the patient following the exam.              ADI SENA MD             Electronically Signed and Approved By: ADI SENA MD on 6/11/2020 at 12:08                           PAST, FAMILY   Past Medical History      Past Medical History:  Arthritis      Hematology/Oncology (F):  Breast Cancer            Past Surgical History      Biopsy, Hysterectomy, Mastectomy Bilateral, Thyroid Surgery            Family History      Family History:  Breast Cancer, Leukemia            Social History      Lives independently:  No            Tobacco Use      Tobacco status:  Former smoker      Smoking packs/day:  1            Substance Use      Substance use:  Denies use            REVIEW OF SYSTEMS      General:  Denies: Appetite Change, Fatigue, Fever, Night Sweats, Weight Gain,     Weight Loss      Eye:  Denies Blurred Vision, Denies Corrective Lenses, Denies Diplopia, Denies     Vision Changes      ENT:  Denies Headache, Denies Hearing Loss, Denies Hoarseness, Denies Sore     Throat      Cardiovascular:  Denies Chest Pain, Denies Palpitations      Respiratory:  Denies: Cough, Coughing Blood, Productive Cough, Shortness of Air,    Wheezing      Gastrointestinal:  Denies Bloody Stools, Denies Constipation, Denies Diarrhea,     Denies Nausea/Vomiting, Denies Problem Swallowing, Denies Unable to Control     Bowels      Genitourinary:  Denies Blood in Urine, Denies Incontinence, Denies Painful     Urination      Musculoskeletal:  Denies Back Pain, Denies Muscle Pain, Denies Painful Joints      Integumentary:  Denies Itching, Denies Lesions, Denies Rash      Neurologic:  Denies Dizziness, Denies Numbness\Tingling, Denies Seizures      Psychiatric:  Denies Anxiety,  Denies Depression      Endocrine:  Denies Cold Intolerance, Denies Heat Intolerance      Hematologic/Lymphatic:  Denies Bruising, Denies Bleeding, Denies Enlarged Lymph     Nodes      Reproductive:  Denies: Menopause, Heavy Periods, Pregnant, Still Menstruating            VITAL SIGNS AND SCORES      Vitals      Weight 144 lbs 6.421 oz / 65.5 kg      Temperature 97.6 F / 36.44 C - Temporal      Pulse 83      Respirations 18      Blood Pressure 105/70 Sitting, Right Arm      Pulse Oximetry 100%, ROOM AIR            Pain Score      Experiencing any pain?:  No      Pain Scale Used:  Numerical      Pain Intensity:  0            Fatigue Score      Experiencing any fatigue?:  No      Fatigue (0-10 scale):  0 (none)            EXAM      General appearance:  in no apparent distress, cooperative, appears stated age.      HEENT: No pallor, no icterus, oral mucosa moist      Neck: Supple, trachea central-not deviated      Lymph nodes: none palpable peripherally      Cardiovascular: S1-S2 heard, no murmurs, no rubs, no gallops.      Breast exam: Bilateral breasts with lumps or masses palpated.       Respiratory: Clear to auscultation bilaterally, no adventitious sounds      Abdomen/gastro: Soft, nontender, no palpable hepatosplenomegaly, bowel sounds     heard      Skin: No lesions, no rashes, no petechiae.      Extremities: No pedal edema, peripheral pulses felt, no clubbing      Musculoskeletal: Normal tone, no rigidity of muscles; range of motion intact      Spine: No deformities      Psychiatric: Alert and oriented x3, appropriate affect, intact judgment      Neurologic: Cranial nerves II through XII grossly intact, no gross neurological     deficits noted.            PREVENTION      Influenza Vaccine Declined:  Yes      2 or More Falls in Past Year?:  No      Fall Past Year with Injury?:  No      Hx Pneumococcal Vaccination:  Yes      Encouraged to follow-up with:  PCP regarding preventative exams.      Chart initiated by       JOHN YOUNG [facility]            ALLERGY/MEDS      Allergies      Coded Allergies:             PHENAZOPYRIDINE (Verified  Allergy, Severe, RASH, ITCH, DIFFICULTY     BREATHING, 10/27/20)           SULFA (SULFONAMIDE ANTIBIOTICS) (Verified  Allergy, Severe, RASH, ITCHING,     HARD TO BREATH, 10/27/20)           GUAIFENESIN (Verified  Allergy, Unknown, RASH, ITCH, 10/27/20)           PHENYLPROPANOLAMINE (Verified  Allergy, Unknown, RASH, ITCH, 10/27/20)            Medications      Last Reconciled on 10/27/20 11:07 by TYLOR ROMO      Venlafaxine XR (Effexor XR) 37.5 Mg Cap.er.24h      37.5 MG PO QDAY for 90 Days, #90 TAB.ER 2 Refills         Prov: TYLOR ROMO onc         5/29/20       Letrozole (Letrozole) 2.5 Mg Tablet      2.5 MG PO HS for 90 Days, #90 TAB 2 Refills         Prov: TYLOR ROMO onc         5/29/20       (Vitamin D)   No Conflict Check      PO QDAY         Reported         2/24/20       Omeprazole (Omeprazole*) 20 Mg Tablet.dr      20 MG PO QDAY, #30 CAP 0 Refills         Reported         2/24/20       Bilberry (Bilberry) 100 Mg Cap      100 MG PO QDAY, #90 CAP         Reported         9/18/19       Magnesium Oxide (Magnesium Oxide*) 400 Mg Tablet      400 MG PO QDAY, #30 TAB 0 Refills         Reported         5/28/19       Collagen, Hydrolysate (Bovine) (Collagen Hydrolysate) 1 Gm Powder      1 GM TOPICAL ONCE, #1 BOTTLE         Reported         5/28/19       Vitamin E (Vitamin E*) 400 Units Capsule      400 UNITS PO QDAY, CAP         Reported         1/17/19      Medications Reviewed:  Changes made to meds            IMPRESSION/PLAN      Impression      1) Breast Cancer: Invasive Ductal Carcinoma; LEFT breast, LIQ, diagnosed     6/11/18; Grade 2, ER: 100%, ID: 100%, HER2: 1+, Ki-67: 5% staged mpT1b pN0 M0     Stage IA, Oncotype DX: 14; two foci largest 6 mm; Genetic Testing: CDH1 (+)     (Variant of Uncertain Significance)            Mrs. Solange Srivastava presents for 4 month follow up  for invasive early stage left     breast cancer. She reports she is doing well in the interim. At her last visit,     on clinical breast exam she had been having left axillary pain. It was felt to     be related to use of her dominant hand since she has a missing right hand due to    a previous work accident.             Thankfully, the left axilllary US showed a benign lymph node and on today's exam    no further axillary pain is noted.             She is 2 years out now from bilateral mastectomies with breast reconstruction.     She has been taking Letrozole and tolerating fairly well.            Plan      1) Continue Letrozole. No refills needed.             Continue Calcium and Vitamin D supplementation.             Next bone density due in September 2021.             She will follow up with Dr. Ann at the next visit  in 6 months and alternate     visits with NP.            Pain      Pain Zero Today            Advanced Care Plan Discussion      Declines Discussion 1124F            Patient Education            [employer] X-ray Absorptiometry      Letrozole      Patient Education Provided:  Yes            Electronically signed by TYLOR ROMO onc  10/27/2020 11:07       Disclaimer: Converted document may not contain table formatting or lab diagrams. Please see [employer] for the authenticated document.

## 2021-05-28 NOTE — PROGRESS NOTES
Patient: SAMANTHA MCKENNA     Acct: [id number]     Report: #[id number]  UNIT #: [id number]     : 1975    Encounter Date:2021  PRIMARY CARE: STERLING JAMIL  ***Signed***  --------------------------------------------------------------------------------------------------------------------  TELEHEALTH NOTE      Past Oncology Illness History      1) Breast Cancer: Invasive Ductal Carcinoma; LEFT breast, LIQ, diagnosed     18; Grade 2, ER: 100%, WV: 100%, HER2: 1+, Ki-67: 5% staged mpT1b pN0 M0     Stage IA, Oncotype DX: 14; two foci largest 6 mm; Genetic Testing: CDH1 (+)     (Variant of Uncertain Significance)            Treatment History:            1) s/p bilateral mastectomy and left SLND 18      2) Patient deferred adjuvant endocrine therapy due to concerns for recovery from    breast reconstruction      3) Femara started with ongoing reconstruction; DEXA ordered (19)      4) Bone scan: normal. 19      5) Tolerating Femara well. Osteopenia at L2, normal LS. 19      6) Left axillary pain: plan for diag. mammo/US to eval. (20).       7) bilateral axillary US: benign LN to the left axillary area. (20)      8) Alk phos elevated at 108. normal alk phos level of 42-98. Recheck in 4     months. (5/3/21)            History of Present Illness            Chief Complaint: (BREAST CA)            Samantha Mckenna is presenting for evaluation via Telehealth visit by phone. Verbal     consent obtained before beginning visit.            Provider spent (6) minutes with the patient during telehealth visit.            The following staff were present during the visit: ( Mandy Aguilar, APRN)                         Overview of Symptoms      1) Breast Cancer: Invasive Ductal Carcinoma; LEFT breast, LIQ, diagnosed     18; Grade 2, ER: 100%, WV: 100%, HER2: 1+, Ki-67: 5% staged mpT1b pN0 M0     Stage IA, Oncotype DX: 14; two foci largest 6 mm; Genetic Testing: CDH1 (+)     (Variant of  Uncertain Significance).             This is a 6 month telehealth visit to check on Mrs. Solange Srivastava. She has history of    early stage breast cancer. She reports she is taking Letrozole well without any     adverse side effects. She offers no complaints at todays visit including     headaches, back or bone pain. She continues to take Calcium and Vitamin D suppl    ementation. She has a bone density scheduled for this September. Last BMD noted     to have osteopenia to the L5 area, and was otherwise normal in the spine and     hips.             She does report she had an EGD in January and was noted to have Ying's     esophagus. She reports she was told Ying's is improving and continues to take    Omeprazole and follow with GI.            2) Elevated alkaline phosphatase: Noted to be slightly elevated last May of 2020    of 107. Recheck 1 year later of 108. Normal alk phos level of 42 - 98. She     denies any right side abdominal pain or bone or back pain. Normal liver function    with AST / ALT.            Most Recent Lab Findings      Laboratory Tests      4/27/21 09:54            4/27/21 10:08            Allergies/Medications      Allergies:        Coded Allergies:             PHENAZOPYRIDINE (Verified  Allergy, Severe, RASH, ITCH, DIFFICULTY     BREATHING, 5/4/21)           SULFA (SULFONAMIDE ANTIBIOTICS) (Verified  Allergy, Severe, RASH, ITCHING,     HARD TO BREATH, 5/4/21)           GUAIFENESIN (Verified  Allergy, Unknown, RASH, ITCH, 5/4/21)           PHENYLPROPANOLAMINE (Verified  Allergy, Unknown, RASH, ITCH, 5/4/21)      Medications    Last Reconciled on 5/4/21 11:07 by TYLOR ROMO      Letrozole (Letrozole) 2.5 Mg Tablet      2.5 MG PO QDAY, #90 TAB 1 Refill         Prov: TYLOR ROMO onc         5/4/21       (Vitamin D)   No Conflict Check      PO QDAY         Reported         2/24/20       Omeprazole (Omeprazole*) 20 Mg Tablet.      20 MG PO QDAY, #30 CAP 0 Refills         Reported          2/24/20       Bilberry (Bilberry) 100 Mg Cap      100 MG PO QDAY, #90 CAP         Reported         9/18/19       Magnesium Oxide (Magnesium Oxide*) 400 Mg Tablet      400 MG PO QDAY, #30 TAB 0 Refills         Reported         5/28/19       Vitamin E (Vitamin E*) 400 Units Capsule      400 UNITS PO QDAY, CAP         Reported         1/17/19            Plan/Instructions      Ambulatory Assessment/Plan:        Elevated alkaline phosphatase level - R74.8            Notes      New Medications      * Letrozole 2.5 MG TABLET: 2.5 MG PO QDAY #90      New Diagnostics      * CMP Comp Metabolic Panel, 4 Months         Dx: Elevated alkaline phosphatase level - R74.8      Plan/Instructions      1) Continue Letrozole.             Repeat CMP in 4 months. Likely benign etiology since not elevated more than 1.5     - 2 times the upper limits of normal.             Dexa scan scheduled for September 2021.             Follow up in 6 months with NP or MD for elevated alk phos level and breast     cancer follow up.             * Plan Of Care: ()            * Chronic conditions reviewed and taken into consideration for today's treatment       plan.      * Patient instructed to seek medical attention urgently for new or worsening       symptoms.      * Patient was educated/instructed on their diagnosis, treatment and medications       prior to discharge from the clinic today.      Codes:  Phone Eval 5-10 min [id number]            Electronically signed by TYLOR ROMO onc  05/04/2021 11:07       Disclaimer: Converted document may not contain table formatting or lab diagrams. Please see [employer] for the authenticated document.

## 2021-05-28 NOTE — PROGRESS NOTES
Patient: SAMANTHA MCKENNA     Acct: [id number]     Report: #[id number]  UNIT #: [id number]     : 1975    Encounter Date:2020  PRIMARY CARE: STERLING JAMIL  ***Signed***  --------------------------------------------------------------------------------------------------------------------  TELEHEALTH NOTE      History of Present Illness            Chief Complaint: (BREAST CANCER)            Samantha Mckenna is presenting for evaluation via Telehealth visit by phone. Verbal     consent obtained before beginning visit.            Provider spent (5) minutes with the patient during telehealth visit.            The following staff were present during the visit: ( Mandy Aguilar, JASSI)                         Past Med History      1) Breast Cancer: Invasive Ductal Carcinoma; LEFT breast, LIQ, diagnosed     18; Grade 2, ER: 100%, [place]: 100%, HER2: 1+, Ki-67: 5% staged mpT1b pN0 M0     Stage IA, Oncotype DX: 14; two foci largest 6 mm; Genetic Testing: CDH1 (+)     (Variant of Uncertain Significance)            Treatment History:            1) s/p bilateral mastectomy and left SLND 18      2) Patient deferred adjuvant endocrine therapy due to concerns for recovery from    breast reconstruction      3) Femara started with ongoing reconstruction; DEXA ordered (19)      4) Bone scan: normal. 19      5) Tolerating Femara well. Osteopenia at L2, normal LS. 19      6) Left axillary pain: plan for diag. mammo/US to eval. (20)      7) Bilateral Axilliay US: benign LN to the left axillary area. (20)                         Overview of Symptoms      1) Breast Cancer: Invasive Ductal Carcinoma; LEFT breast, LIQ, diagnosed     18; Grade 2, ER: 100%, WI: 100%, HER2: 1+, Ki-67: 5% staged mpT1b pN0 M0     Stage IA, Oncotype DX: 14; two foci largest 6 mm; Genetic Testing: CDH1 (+)     (Variant of Uncertain Significance).            Mrs. Samantha Mckenna was seen approximately 2 weeks ago for 3 month  follow up. At this     visit, she was having increased left axillary pain. Patient only has use of her     left hand. Her right hand is non-existent after a work related accident several     years ago.             At this visit, she was doing well otherwise. She was tolerating AI medication     and was given a refill for this. She was given Effexor for hot flashes.              She has had bilateral breast reconstruction.             2) Left axillary pain:  At her last visit, she was noted to have left axillary     pain from the axillary area radiating down the left arm. A bilateral axillary US    was completed with no abnormalties noted. The axillary pain and radiation of     pain is likely related to overuse syndrome since this is the only arm with hand     movements.            Most Recent Lab Findings      [facility]                PACS RADIOLOGY REPORT            Patient: SAMANTHA MCKENNA   Acct: #[id number]   Report: #[id number]            UNIT #: [id number]    DOS: 20       INSURANCE:MEDICARE PART A   LOCATION:MAMMO     : 1975            PROVIDERS      ADMITTING:     ATTENDING: TYLOR ROMO      FAMILY:  DEJAN HAYWOOD   ORDERING:  TYLOR ROMO         OTHER:    DICTATING:  Maria Fernanda Barker MD            REQ #:[id number]   EXAM:AXILLARYL - US AXILLARY UNILATERAL LEFT      REASON FOR EXAM:  LT BREAST PAIN      REASON FOR VISIT:  LT BREAST PAIN            *******Signed******         PROCEDURE:   US AXILLARY UNILATERAL LEFT             COMPARISON:   [facility], MG, DIG SCREENING BILAT NOREEN W 3D    TRUMAN, 2018,       12:50.  [facility], MR, BREAST BILATERAL W/WO CONTRAST, 2018,    14:28.        [facility], CT, CHEST W/ CONTRAST, 2018, 10:35.             INDICATIONS:   45-year-old woman with history of left breast cancer in 2018     status post  mastectomy.        She reports that sampled left axillary lymph nodes were benign.  She complains     of left axillary       pain radiating to the left arm.             FINDINGS:   No sonographic abnormality is seen in the left axilla.  There is a     benign appearing left       axillary lymph node measuring up to 1.9 cm with preservation of the normal fatty    hilum and 2 mm       cortical thickness.             CONCLUSION:   Sonographically benign appearing left axillary lymph node.  No     sonographic abnormality       is seen in the left axilla to account for the patient's symptom of left axillary    pain radiating       into the left arm.  Recommend management of this symptom based on findings at     clinical examination.        I discussed the result with the patient following the exam.             ADI SENA MD             Electronically Signed and Approved By: ADI SENA MD on 6/11/2020 at 12:08                           Allergies/Medications      Allergies:        Coded Allergies:             PHENAZOPYRIDINE (Verified  Allergy, Severe, RASH, ITCH, DIFFICULTY     BREATHING, 6/12/20)           SULFA (SULFONAMIDE ANTIBIOTICS) (Verified  Allergy, Severe, RASH, ITCHING,     HARD TO BREATH, 6/12/20)           GUAIFENESIN (Verified  Allergy, Unknown, RASH, ITCH, 6/12/20)           PHENYLPROPANOLAMINE (Verified  Allergy, Unknown, RASH, ITCH, 6/12/20)      Medications    Last Reconciled on 6/12/20 13:24 by TYLOR ROMO      Venlafaxine XR (Effexor XR) 37.5 Mg Cap.er.24h      37.5 MG PO QDAY for 90 Days, #90 TAB.ER 2 Refills         Prov: TYLOR ROMO onc         5/29/20       Letrozole (Letrozole) 2.5 Mg Tablet      2.5 MG PO HS for 90 Days, #90 TAB 2 Refills         Prov: TYLOR ROMO onc         5/29/20       (Vitamin D)   No Conflict Check      PO QDAY         Reported         2/24/20       Omeprazole (Omeprazole*) 20 Mg Tablet.dr      20 MG PO QDAY, #30 CAP 0 Refills          Reported         2/24/20       Bilberry (Bilberry) 100 Mg Cap      100 MG PO QDAY, #90 CAP         Reported         9/18/19       Magnesium Oxide (Magnesium Oxide*) 400 Mg Tablet      400 MG PO QDAY, #30 TAB 0 Refills         Reported         5/28/19       Collagen, Hydrolysate (Bovine) (Collagen Hydrolysate) 1 Gm Powder      1 GM TOPICAL ONCE, #1 BOTTLE         Reported         5/28/19       Panax Ginseng Root Extract (Ginseng Extract) 50 Mg Capsule      50 MG PO QDAY, CAP         Reported         5/28/19       Vitamin E (Vitamin E*) 400 Units Capsule      400 UNITS PO QDAY, CAP         Reported         1/17/19            Plan/Instructions            1) Continue Letrozole.             Continue Effexor.             2) Reviewed results of the bilateral axillary US.             Return in 4 months with NP.             Call with any questions or concerns.             * Plan Of Care: ()            * Chronic conditions reviewed and taken into consideration for today's treatment       plan.      * Patient instructed to seek medical attention urgently for new or worsening       symptoms.      * Patient was educated/instructed on their diagnosis, treatment and medications       prior to discharge from the clinic today.      Codes:  Phone Eval 5-10 min [id number]            Copies To:      STERLING JAMIL            Electronically signed by TYLOR ROMO onc  06/12/2020 13:24       Disclaimer: Converted document may not contain table formatting or lab diagrams. Please see [employer] for the authenticated document.

## 2021-06-17 RX ORDER — VENLAFAXINE 37.5 MG/1
37.5 TABLET ORAL DAILY
Qty: 90 TABLET | Refills: 2 | Status: SHIPPED | OUTPATIENT
Start: 2021-06-17 | End: 2022-05-27

## 2021-06-17 RX ORDER — VENLAFAXINE 37.5 MG/1
37.5 TABLET ORAL DAILY
COMMUNITY
End: 2021-06-17 | Stop reason: SDUPTHER

## 2021-09-08 PROBLEM — C50.312 MALIGNANT NEOPLASM OF LOWER-INNER QUADRANT OF LEFT BREAST IN FEMALE, ESTROGEN RECEPTOR POSITIVE: Status: ACTIVE | Noted: 2018-06-11

## 2021-09-08 PROBLEM — Z17.0 MALIGNANT NEOPLASM OF LOWER-INNER QUADRANT OF LEFT BREAST IN FEMALE, ESTROGEN RECEPTOR POSITIVE (HCC): Status: ACTIVE | Noted: 2018-06-11

## 2021-09-10 ENCOUNTER — HOSPITAL ENCOUNTER (OUTPATIENT)
Dept: BONE DENSITY | Facility: HOSPITAL | Age: 46
Discharge: HOME | End: 2021-09-10
Admitting: INTERNAL MEDICINE

## 2021-09-10 DIAGNOSIS — Z78.0 POST-MENOPAUSAL: ICD-10-CM

## 2021-09-10 PROBLEM — S93.409A SPRAIN OF ANKLE: Status: ACTIVE | Noted: 2018-01-25

## 2021-09-10 PROBLEM — J34.9 SINUS TROUBLE: Status: ACTIVE | Noted: 2021-09-10

## 2021-09-10 PROBLEM — K76.0 FATTY LIVER: Status: ACTIVE | Noted: 2021-09-10

## 2021-09-10 PROBLEM — F32.A DEPRESSION: Status: ACTIVE | Noted: 2021-09-10

## 2021-09-10 PROBLEM — C50.919 BREAST CANCER: Status: ACTIVE | Noted: 2021-09-10

## 2021-09-10 PROBLEM — R61 NIGHT SWEATS: Status: ACTIVE | Noted: 2021-09-10

## 2021-09-10 PROBLEM — J30.2 SEASONAL ALLERGIC RHINITIS: Status: ACTIVE | Noted: 2021-09-10

## 2021-09-10 PROBLEM — M19.90 ARTHRITIS: Status: ACTIVE | Noted: 2021-09-10

## 2021-09-10 PROBLEM — F41.9 ANXIETY: Status: ACTIVE | Noted: 2021-09-10

## 2021-09-10 PROBLEM — B00.1 COLD SORE: Status: ACTIVE | Noted: 2021-09-10

## 2021-09-10 PROBLEM — K52.3 INDETERMINATE COLITIS: Status: ACTIVE | Noted: 2021-09-10

## 2021-09-10 PROCEDURE — 77080 DXA BONE DENSITY AXIAL: CPT

## 2021-09-10 RX ORDER — AMOXICILLIN 500 MG/1
500 CAPSULE ORAL 3 TIMES DAILY
COMMUNITY
Start: 2021-09-07 | End: 2022-11-08

## 2021-09-10 RX ORDER — OXYCODONE HYDROCHLORIDE AND ACETAMINOPHEN 5; 325 MG/1; MG/1
TABLET ORAL
COMMUNITY
Start: 2021-09-07 | End: 2021-09-13 | Stop reason: SDUPTHER

## 2021-09-10 RX ORDER — OMEPRAZOLE 20 MG/1
20 CAPSULE, DELAYED RELEASE ORAL DAILY
COMMUNITY
Start: 2021-07-27 | End: 2022-03-14

## 2021-09-13 ENCOUNTER — OFFICE VISIT (OUTPATIENT)
Dept: ONCOLOGY | Facility: HOSPITAL | Age: 46
End: 2021-09-13

## 2021-09-13 VITALS
RESPIRATION RATE: 18 BRPM | HEART RATE: 79 BPM | DIASTOLIC BLOOD PRESSURE: 71 MMHG | WEIGHT: 141.98 LBS | TEMPERATURE: 97.9 F | SYSTOLIC BLOOD PRESSURE: 112 MMHG | BODY MASS INDEX: 26.83 KG/M2 | OXYGEN SATURATION: 99 %

## 2021-09-13 DIAGNOSIS — C50.919 MALIGNANT NEOPLASM OF FEMALE BREAST, UNSPECIFIED ESTROGEN RECEPTOR STATUS, UNSPECIFIED LATERALITY, UNSPECIFIED SITE OF BREAST (HCC): ICD-10-CM

## 2021-09-13 DIAGNOSIS — Z17.0 MALIGNANT NEOPLASM OF LOWER-INNER QUADRANT OF LEFT BREAST IN FEMALE, ESTROGEN RECEPTOR POSITIVE (HCC): Primary | ICD-10-CM

## 2021-09-13 DIAGNOSIS — C50.312 MALIGNANT NEOPLASM OF LOWER-INNER QUADRANT OF LEFT BREAST IN FEMALE, ESTROGEN RECEPTOR POSITIVE (HCC): Primary | ICD-10-CM

## 2021-09-13 PROCEDURE — G0463 HOSPITAL OUTPT CLINIC VISIT: HCPCS | Performed by: INTERNAL MEDICINE

## 2021-09-13 PROCEDURE — 99214 OFFICE O/P EST MOD 30 MIN: CPT | Performed by: INTERNAL MEDICINE

## 2021-09-13 NOTE — PROGRESS NOTES
Patient  Solange Srivastava    [facility] HEMATOLOGY & ONCOLOGY    Chief Complaint  Breast Cancer and Follow-up    Referring Provider: RAPHAEL Ortiz*  PCP: Provider, No Known    Subjective          Oncology/Hematology History Overview Note    Breast Cancer: Invasive Ductal Carcinoma; LEFT breast, LIQ, diagnosed     6/11/18; Grade 2, ER: 100%, [place]: 100%, HER2: 1+, Ki-67: 5% staged mpT1b pN0 M0     Stage IA, Oncotype DX: 14; two foci largest 6 mm; Genetic Testing: CDH1 (+)     (Variant of Uncertain Significance)            Treatment History:            1) s/p bilateral mastectomy and left SLND 8/14/18      2) Patient deferred adjuvant endocrine therapy due to concerns for recovery from    breast reconstruction      3) Femara started with ongoing reconstruction; DEXA ordered (7/25/19)      4) Bone scan: normal. 8/6/19      5) Tolerating Femara well. Osteopenia at L2, normal LS. 11/22/19      6) Left axillary pain: plan for diag. mammo/US to eval. (5/29/20).       7) bilateral axillary US: benign LN to the left axillary area. (6/11/20)      8) Alk phos elevated at 108. normal alk phos level of 42-98. Recheck in 4     months. (5/3/21)         Malignant neoplasm of lower-inner quadrant of left breast in female, estrogen receptor positive (CMS/HCC)   6/11/2018 Initial Diagnosis    Malignant neoplasm of lower-inner quadrant of left breast in female, estrogen receptor positive (CMS/HCC)         History of Present Illness  Patient comes in today for follow-up after her DEXA scan which was done on 9/10/2021 and significant for osteopenia.  When asked about letrozole the patient said she stopped taking it shortly after the previous appointment which was in March.  She said she was concerned about bone thinning although it is unclear how she knew about this issue as we just completed the DEXA scan.  I encouraged her to restart the medication as I would recommend at least 5 years of treatment.   Patient tells me that she has been experiencing some fatigue this week that is little more than usual but otherwise has no complaints.    She still does not have a primary care provider.  I asked her to please establish with one because there are other health maintenance things that need to be considered.  She asked that I collected labs including a CBC and CMP in 6 months so that she can have them done annually.    Review of Systems   Constitutional: Positive for fatigue. Negative for appetite change, diaphoresis, fever, unexpected weight gain and unexpected weight loss.   HENT: Negative for hearing loss, mouth sores, sore throat, swollen glands, trouble swallowing and voice change.    Eyes: Negative for blurred vision.   Respiratory: Negative for cough, shortness of breath and wheezing.    Cardiovascular: Negative for chest pain and palpitations.   Gastrointestinal: Negative for abdominal pain, blood in stool, constipation, diarrhea, nausea and vomiting.   Endocrine: Negative for cold intolerance and heat intolerance.   Genitourinary: Negative for difficulty urinating, dysuria, frequency, hematuria and urinary incontinence.   Musculoskeletal: Negative for arthralgias, back pain and myalgias.   Skin: Negative for rash, skin lesions and bruise.   Neurological: Negative for dizziness, seizures, weakness, numbness and headache.   Hematological: Does not bruise/bleed easily.   Psychiatric/Behavioral: Negative for depressed mood. The patient is not nervous/anxious.        Past Medical History:   Diagnosis Date   • Anxiety    • Arthritis    • Breast cancer (CMS/HCC)    • Claustrophobia    • Cold sore    • Colitis    • Depression    • Fatty liver    • Night sweats    • Seasonal allergies    • Sinus trouble    • Sprain of ankle, right 01/25/2018   • Tobacco use      Past Surgical History:   Procedure Laterality Date   • ABDOMINOPLASTY  10/15/2019   • APPENDECTOMY     • BREAST BIOPSY Bilateral    • BREAST RECONSTRUCTION   2018    with tissue expander    • FAT GRAFTING  1/23/19,6/4/19,10/15/1    an excess skin excision    • HAND SURGERY Bilateral 2010    thumb amputation    • HYSTERECTOMY      left one ovary   • MASTECTOMY Bilateral 2018   • NIPPLE RECONSTRUCTION  06/04/2019   • THYROID SURGERY       Social History     Socioeconomic History   • Marital status:      Spouse name: Not on file   • Number of children: Not on file   • Years of education: Not on file   • Highest education level: Not on file   Tobacco Use   • Smoking status: Current Every Day Smoker     Types: Electronic Cigarette   Substance and Sexual Activity   • Alcohol use: No   • Drug use: No     Family History   Problem Relation Age of Onset   • Prostate cancer Brother 38   • Diabetes Brother    • Breast cancer Maternal Aunt 55   • Melanoma Maternal Aunt 45   • Stroke Father    • Diabetes Maternal Grandmother    • Breast cancer Other         malignant   • Melanoma Other    • Diabetes Maternal Aunt        Objective   Physical Exam  General: Alert, cooperative, no acute distress  Eyes: Anicteric sclera, PERRLA  Respiratory: normal respiratory effort  Cardiovascular: no lower extremity edema  Skin: Normal tone, no rash, no lesions  Psychiatric: Appropriate affect, intact judgment  Neurologic: No focal sensory or motor deficits, normal cognition   Musculoskeletal: Normal muscle strength and tone  Extremities: No clubbing, cyanosis, or deformities    Vitals:    09/13/21 0956   BP: 112/71  Comment: RT ARM   Pulse: 79   Resp: 18   Temp: 97.9 °F (36.6 °C)   TempSrc: Temporal   SpO2: 99%  Comment: RM AIR   Weight: 64.4 kg (141 lb 15.6 oz)   PainSc: 0-No pain               PHQ-9 Total Score: 0       Result Review :   The following data was reviewed by: Catalina Ann MD PhD on 09/13/2021:  Lab Results   Component Value Date    HGB 13.0 04/27/2021    HCT 37.6 04/27/2021    MCV 85.6 04/27/2021     05/03/2020    WBC 5.16 04/27/2021    NEUTROABS 2.98 04/27/2021     LYMPHSABS 1.82 04/27/2021    MONOSABS 0.29 04/27/2021    EOSABS 0.06 04/27/2021    BASOSABS 0.01 04/27/2021     Lab Results   Component Value Date    BUN 21 04/27/2021    CREATININE 0.56 04/27/2021     04/27/2021    K 3.7 04/27/2021     04/27/2021    CO2 25 04/27/2021    CALCIUM 9.5 04/27/2021    PROTEINTOT 6.9 04/27/2021    ALBUMIN 4.2 04/27/2021    BILITOT 0.33 04/27/2021    ALKPHOS 108 (H) 04/27/2021    AST 14 (L) 04/27/2021    ALT 11 04/27/2021          Assessment and Plan    Diagnoses and all orders for this visit:    1. Malignant neoplasm of lower-inner quadrant of left breast in female, estrogen receptor positive (CMS/HCC) (Primary)    2. Malignant neoplasm of female breast, unspecified estrogen receptor status, unspecified laterality, unspecified site of breast (CMS/HCC)  -     CBC & Differential; Future  -     Comprehensive Metabolic Panel; Future      ER positive breast cancer: Patient quit taking letrozole due to concerns of her bone density.  Her DEXA scan on 9/10/2021 confirms osteopenia which could be related to her letrozole.  I encouraged her to continue daily calcium and vitamin D but do recommend she restart the medication and continue for least 5 years.  She says she has plenty the medication at home and does not need a refill.    At the patient's request I will schedule a CBC and CMP to monitor for evidence of disease progression.  I also encouraged the patient to establish with a primary care provider for other healthcare maintenance going forward.    Patient was given instructions and counseling regarding her condition or for health maintenance advice. Please see specific information pulled into the AVS if appropriate.     Catalina Ann MD PhD    9/13/2021

## 2021-12-29 ENCOUNTER — TELEPHONE (OUTPATIENT)
Dept: ORTHOPEDIC SURGERY | Facility: CLINIC | Age: 46
End: 2021-12-29

## 2022-03-14 ENCOUNTER — OFFICE VISIT (OUTPATIENT)
Dept: ONCOLOGY | Facility: HOSPITAL | Age: 47
End: 2022-03-14

## 2022-03-14 ENCOUNTER — LAB (OUTPATIENT)
Dept: ONCOLOGY | Facility: HOSPITAL | Age: 47
End: 2022-03-14

## 2022-03-14 VITALS
RESPIRATION RATE: 18 BRPM | WEIGHT: 140.21 LBS | OXYGEN SATURATION: 99 % | BODY MASS INDEX: 26.49 KG/M2 | SYSTOLIC BLOOD PRESSURE: 108 MMHG | TEMPERATURE: 98.2 F | HEART RATE: 86 BPM | DIASTOLIC BLOOD PRESSURE: 64 MMHG

## 2022-03-14 DIAGNOSIS — Z17.0 MALIGNANT NEOPLASM OF LOWER-INNER QUADRANT OF LEFT BREAST IN FEMALE, ESTROGEN RECEPTOR POSITIVE: Primary | ICD-10-CM

## 2022-03-14 DIAGNOSIS — C50.919 MALIGNANT NEOPLASM OF FEMALE BREAST, UNSPECIFIED ESTROGEN RECEPTOR STATUS, UNSPECIFIED LATERALITY, UNSPECIFIED SITE OF BREAST: ICD-10-CM

## 2022-03-14 DIAGNOSIS — C50.312 MALIGNANT NEOPLASM OF LOWER-INNER QUADRANT OF LEFT BREAST IN FEMALE, ESTROGEN RECEPTOR POSITIVE: Primary | ICD-10-CM

## 2022-03-14 LAB
ALBUMIN SERPL-MCNC: 4.4 G/DL (ref 3.5–5.2)
ALBUMIN/GLOB SERPL: 2 G/DL
ALP SERPL-CCNC: 97 U/L (ref 39–117)
ALT SERPL W P-5'-P-CCNC: 12 U/L (ref 1–33)
ANION GAP SERPL CALCULATED.3IONS-SCNC: 8 MMOL/L (ref 5–15)
AST SERPL-CCNC: 14 U/L (ref 1–32)
BASOPHILS # BLD AUTO: 0.02 10*3/MM3 (ref 0–0.2)
BASOPHILS NFR BLD AUTO: 0.3 % (ref 0–1.5)
BILIRUB SERPL-MCNC: 0.3 MG/DL (ref 0–1.2)
BUN SERPL-MCNC: 24 MG/DL (ref 6–20)
BUN/CREAT SERPL: 45.3 (ref 7–25)
CALCIUM SPEC-SCNC: 9.6 MG/DL (ref 8.6–10.5)
CHLORIDE SERPL-SCNC: 106 MMOL/L (ref 98–107)
CO2 SERPL-SCNC: 25 MMOL/L (ref 22–29)
CREAT SERPL-MCNC: 0.53 MG/DL (ref 0.57–1)
DEPRECATED RDW RBC AUTO: 42.3 FL (ref 37–54)
EGFRCR SERPLBLD CKD-EPI 2021: 116.4 ML/MIN/1.73
EOSINOPHIL # BLD AUTO: 0.14 10*3/MM3 (ref 0–0.4)
EOSINOPHIL NFR BLD AUTO: 2.2 % (ref 0.3–6.2)
ERYTHROCYTE [DISTWIDTH] IN BLOOD BY AUTOMATED COUNT: 12.9 % (ref 12.3–15.4)
GLOBULIN UR ELPH-MCNC: 2.2 GM/DL
GLUCOSE SERPL-MCNC: 93 MG/DL (ref 65–99)
HCT VFR BLD AUTO: 39 % (ref 34–46.6)
HGB BLD-MCNC: 13.2 G/DL (ref 12–15.9)
IMM GRANULOCYTES # BLD AUTO: 0.01 10*3/MM3 (ref 0–0.05)
IMM GRANULOCYTES NFR BLD AUTO: 0.2 % (ref 0–0.5)
LYMPHOCYTES # BLD AUTO: 2.2 10*3/MM3 (ref 0.7–3.1)
LYMPHOCYTES NFR BLD AUTO: 34.5 % (ref 19.6–45.3)
MCH RBC QN AUTO: 29.4 PG (ref 26.6–33)
MCHC RBC AUTO-ENTMCNC: 33.8 G/DL (ref 31.5–35.7)
MCV RBC AUTO: 86.9 FL (ref 79–97)
MONOCYTES # BLD AUTO: 0.35 10*3/MM3 (ref 0.1–0.9)
MONOCYTES NFR BLD AUTO: 5.5 % (ref 5–12)
NEUTROPHILS NFR BLD AUTO: 3.65 10*3/MM3 (ref 1.7–7)
NEUTROPHILS NFR BLD AUTO: 57.3 % (ref 42.7–76)
PLATELET # BLD AUTO: 249 10*3/MM3 (ref 140–450)
PMV BLD AUTO: 9.8 FL (ref 6–12)
POTASSIUM SERPL-SCNC: 3.9 MMOL/L (ref 3.5–5.2)
PROT SERPL-MCNC: 6.6 G/DL (ref 6–8.5)
RBC # BLD AUTO: 4.49 10*6/MM3 (ref 3.77–5.28)
SODIUM SERPL-SCNC: 139 MMOL/L (ref 136–145)
WBC NRBC COR # BLD: 6.37 10*3/MM3 (ref 3.4–10.8)

## 2022-03-14 PROCEDURE — 36415 COLL VENOUS BLD VENIPUNCTURE: CPT

## 2022-03-14 PROCEDURE — 99214 OFFICE O/P EST MOD 30 MIN: CPT | Performed by: INTERNAL MEDICINE

## 2022-03-14 PROCEDURE — G0463 HOSPITAL OUTPT CLINIC VISIT: HCPCS | Performed by: INTERNAL MEDICINE

## 2022-03-14 PROCEDURE — 85025 COMPLETE CBC W/AUTO DIFF WBC: CPT

## 2022-03-14 PROCEDURE — 80053 COMPREHEN METABOLIC PANEL: CPT

## 2022-03-14 RX ORDER — PREDNISONE 20 MG/1
TABLET ORAL
COMMUNITY
Start: 2022-03-08 | End: 2022-10-01 | Stop reason: ALTCHOICE

## 2022-03-14 RX ORDER — L.ACIDOPH/B.ANIMALIS/B.LONGUM 15B CELL
CAPSULE ORAL
COMMUNITY
Start: 2021-12-13

## 2022-03-14 RX ORDER — OMEPRAZOLE 20 MG/1
CAPSULE, DELAYED RELEASE ORAL
Qty: 90 CAPSULE | Refills: 0 | Status: SHIPPED | OUTPATIENT
Start: 2022-03-14

## 2022-03-14 RX ORDER — MELOXICAM 7.5 MG/1
TABLET ORAL
COMMUNITY
Start: 2022-03-08 | End: 2022-10-01 | Stop reason: ALTCHOICE

## 2022-03-14 NOTE — PROGRESS NOTES
Patient  Solange Srivastava    [facility] HEMATOLOGY & ONCOLOGY    Chief Complaint  Breast Cancer    Referring Provider: No Known Provider  PCP: Provider, No Known    Subjective          Oncology/Hematology History Overview Note    Breast Cancer: Invasive Ductal Carcinoma; LEFT breast, LIQ, diagnosed     6/11/18; Grade 2, ER: 100%, MO: 100%, HER2: 1+, Ki-67: 5% staged mpT1b pN0 M0     Stage IA, Oncotype DX: 14; two foci largest 6 mm; Genetic Testing: CDH1 (+)     (Variant of Uncertain Significance)            Treatment History:            1) s/p bilateral mastectomy and left SLND 8/14/18      2) Patient deferred adjuvant endocrine therapy due to concerns for recovery from    breast reconstruction      3) Femara started with ongoing reconstruction; DEXA ordered (7/25/19)      4) Bone scan: normal. 8/6/19      5) Tolerating Femara well. Osteopenia at L2, normal LS. 11/22/19      6) Left axillary pain: plan for diag. mammo/US to eval. (5/29/20).       7) bilateral axillary US: benign LN to the left axillary area. (6/11/20)      8) Alk phos elevated at 108. normal alk phos level of 42-98. Recheck in 4     months. (5/3/21)         Malignant neoplasm of lower-inner quadrant of left breast in female, estrogen receptor positive (HCC)   6/11/2018 Initial Diagnosis    Malignant neoplasm of lower-inner quadrant of left breast in female, estrogen receptor positive (CMS/HCC)         HPI  Patient comes in today for follow-up of breast cancer.  She has been on letrozole since 2019 and is tolerating it well.  She says she has been having pain that shoots from her left axilla down her left arm for several months.  This pain is near her prior surgery but she did not initially feel this sensation.  She still has good range of motion.  The pain is not associated with movement.  She has never had any swelling.    She currently does not have a primary care provider.     Review of Systems   Constitutional: Positive for  fatigue. Negative for appetite change, diaphoresis, fever, unexpected weight gain and unexpected weight loss.   HENT: Negative for hearing loss, sore throat and voice change.    Eyes: Negative for blurred vision, double vision, pain, redness and visual disturbance.   Respiratory: Negative for cough, shortness of breath and wheezing.    Cardiovascular: Negative for chest pain, palpitations and leg swelling.   Endocrine: Negative for cold intolerance, heat intolerance, polydipsia and polyuria.   Genitourinary: Positive for breast pain (Left breast pain ). Negative for decreased urine volume, difficulty urinating, frequency and urinary incontinence.   Musculoskeletal: Negative for arthralgias, back pain, joint swelling and myalgias.   Skin: Negative for color change, rash, skin lesions and wound.   Neurological: Negative for dizziness, seizures, numbness and headache.   Hematological: Negative for adenopathy. Does not bruise/bleed easily.   Psychiatric/Behavioral: Negative for depressed mood. The patient is not nervous/anxious.    All other systems reviewed and are negative.      Past Medical History:   Diagnosis Date   • Anxiety    • Arthritis    • Breast cancer (HCC)    • Claustrophobia    • Cold sore    • Colitis    • Depression    • Fatty liver    • Night sweats    • Seasonal allergies    • Sinus trouble    • Sprain of ankle, right 01/25/2018   • Tobacco use      Past Surgical History:   Procedure Laterality Date   • ABDOMINOPLASTY  10/15/2019   • APPENDECTOMY     • BREAST BIOPSY Bilateral    • BREAST RECONSTRUCTION  2018    with tissue expander    • FAT GRAFTING  1/23/19,6/4/19,10/15/1    an excess skin excision    • HAND SURGERY Bilateral 2010    thumb amputation    • HYSTERECTOMY      left one ovary   • MASTECTOMY Bilateral 2018   • NIPPLE RECONSTRUCTION  06/04/2019   • THYROID SURGERY       Social History     Socioeconomic History   • Marital status:    Tobacco Use   • Smoking status: Current Every Day  Smoker     Types: Electronic Cigarette   Substance and Sexual Activity   • Alcohol use: No   • Drug use: No     Family History   Problem Relation Age of Onset   • Prostate cancer Brother 38   • Diabetes Brother    • Breast cancer Maternal Aunt 55   • Melanoma Maternal Aunt 45   • Stroke Father    • Diabetes Maternal Grandmother    • Breast cancer Other         malignant   • Melanoma Other    • Diabetes Maternal Aunt        Objective   Physical Exam  General: Alert, cooperative, no acute distress, well appearing  Eyes: Anicteric sclera, PERRLA  Axilla: Left axilla has well-healed surgical incision site from lymph node biopsy, no palpable adenopathy or other masses  Respiratory: normal respiratory effort  Cardiovascular: no lower extremity edema  Skin: Normal tone, no rash, no lesions  Psychiatric: Appropriate affect, intact judgment  Neurologic: No focal sensory or motor deficits, normal cognition   Musculoskeletal: Normal muscle strength and tone  Extremities: No clubbing, cyanosis, or deformities    Vitals:    03/14/22 0951   BP: 108/64   Pulse: 86   Resp: 18   Temp: 98.2 °F (36.8 °C)   SpO2: 99%   Weight: 63.6 kg (140 lb 3.4 oz)   PainSc:   5   PainLoc: Back     ECOG score: 0         PHQ-9 Total Score:         Result Review :   The following data was reviewed by: Catalina Ann MD PhD on 03/14/2022:  Lab Results   Component Value Date    HGB 13.2 03/14/2022    HCT 39.0 03/14/2022    MCV 86.9 03/14/2022     03/14/2022    WBC 6.37 03/14/2022    NEUTROABS 3.65 03/14/2022    LYMPHSABS 2.20 03/14/2022    MONOSABS 0.35 03/14/2022    EOSABS 0.14 03/14/2022    BASOSABS 0.02 03/14/2022     Lab Results   Component Value Date    GLUCOSE 93 03/14/2022    BUN 24 (H) 03/14/2022    CREATININE 0.53 (L) 03/14/2022     03/14/2022    K 3.9 03/14/2022     03/14/2022    CO2 25.0 03/14/2022    CALCIUM 9.6 03/14/2022    PROTEINTOT 6.6 03/14/2022    ALBUMIN 4.40 03/14/2022    BILITOT 0.3 03/14/2022    ALKPHOS 97  03/14/2022    AST 14 03/14/2022    ALT 12 03/14/2022          Assessment and Plan    Diagnoses and all orders for this visit:    1. Malignant neoplasm of lower-inner quadrant of left breast in female, estrogen receptor positive (HCC) (Primary)      ER positive left breast cancer: Status post bilateral mastectomy.  Patient is tolerating letrozole well and will continue this medication until 2024.  Her next bone density test will be due in 2023.  Patient will follow up with me in 6 months with repeat labs.  Today's labs are still pending and will contact the patient for any concerns.  She will contact our clinic if the pain in her left axilla continues to worsen.    Patient was given instructions and counseling regarding her condition or for health maintenance advice. Please see specific information pulled into the AVS if appropriate.

## 2022-05-27 RX ORDER — VENLAFAXINE 37.5 MG/1
37.5 TABLET ORAL DAILY
Qty: 90 TABLET | Refills: 2 | Status: SHIPPED | OUTPATIENT
Start: 2022-05-27 | End: 2022-11-08

## 2022-06-22 RX ORDER — OMEPRAZOLE 20 MG/1
CAPSULE, DELAYED RELEASE ORAL
Qty: 90 CAPSULE | Refills: 0 | OUTPATIENT
Start: 2022-06-22

## 2022-08-29 ENCOUNTER — OFFICE VISIT (OUTPATIENT)
Dept: ORTHOPEDIC SURGERY | Facility: CLINIC | Age: 47
End: 2022-08-29

## 2022-08-29 VITALS — BODY MASS INDEX: 26.28 KG/M2 | HEIGHT: 61 IN | WEIGHT: 139.2 LBS | OXYGEN SATURATION: 98 % | HEART RATE: 76 BPM

## 2022-08-29 DIAGNOSIS — M25.552 LEFT HIP PAIN: Primary | ICD-10-CM

## 2022-08-29 DIAGNOSIS — M70.62 TROCHANTERIC BURSITIS OF LEFT HIP: ICD-10-CM

## 2022-08-29 PROCEDURE — 20610 DRAIN/INJ JOINT/BURSA W/O US: CPT | Performed by: ORTHOPAEDIC SURGERY

## 2022-08-29 PROCEDURE — 99213 OFFICE O/P EST LOW 20 MIN: CPT | Performed by: ORTHOPAEDIC SURGERY

## 2022-08-29 RX ORDER — TRIAMCINOLONE ACETONIDE 40 MG/ML
40 INJECTION, SUSPENSION INTRA-ARTICULAR; INTRAMUSCULAR
Status: COMPLETED | OUTPATIENT
Start: 2022-08-29 | End: 2022-08-29

## 2022-08-29 RX ORDER — LIDOCAINE HYDROCHLORIDE 10 MG/ML
9 INJECTION, SOLUTION INFILTRATION; PERINEURAL
Status: COMPLETED | OUTPATIENT
Start: 2022-08-29 | End: 2022-08-29

## 2022-08-29 RX ORDER — IBUPROFEN 800 MG/1
800 TABLET ORAL EVERY 8 HOURS PRN
COMMUNITY
End: 2022-10-01

## 2022-08-29 RX ADMIN — TRIAMCINOLONE ACETONIDE 40 MG: 40 INJECTION, SUSPENSION INTRA-ARTICULAR; INTRAMUSCULAR at 10:02

## 2022-08-29 RX ADMIN — LIDOCAINE HYDROCHLORIDE 9 ML: 10 INJECTION, SOLUTION INFILTRATION; PERINEURAL at 10:02

## 2022-08-29 NOTE — PROGRESS NOTES
"Chief Complaint  Initial Evaluation of the Left Hip and Initial Evaluation of the Right Leg     Subjective      Solange Srivastava presents to [facility] for an evaluation of right leg and left hip. She reports pain in the groin that radiates down laterally and down the thigh. She states prolonged standing causes pain in the hip and bilateral legs. She notices some low back pain as well. She denies any injury or trauma.     Allergies   Allergen Reactions   • Azo [Phenazopyridine] Unknown (See Comments)     Unknown    • Sulfa Antibiotics Unknown (See Comments)     Unknown         Social History     Socioeconomic History   • Marital status:    Tobacco Use   • Smoking status: Current Every Day Smoker     Types: Cigarettes   • Smokeless tobacco: Never Used   Substance and Sexual Activity   • Alcohol use: No   • Drug use: No        Review of Systems     Objective   Vital Signs:   Pulse 76   Ht 154.9 cm (61\")   Wt 63.1 kg (139 lb 3.2 oz)   SpO2 98%   BMI 26.30 kg/m²       Physical Exam  Constitutional:       Appearance: Normal appearance. Patient is well-developed and normal weight.   HENT:      Head: Normocephalic.      Right Ear: Hearing and external ear normal.      Left Ear: Hearing and external ear normal.      Nose: Nose normal.   Eyes:      Conjunctiva/sclera: Conjunctivae normal.   Cardiovascular:      Rate and Rhythm: Normal rate.   Pulmonary:      Effort: Pulmonary effort is normal.      Breath sounds: No wheezing or rales.   Abdominal:      Palpations: Abdomen is soft.      Tenderness: There is no abdominal tenderness.   Musculoskeletal:      Cervical back: Normal range of motion.   Skin:     Findings: No rash.   Neurological:      Mental Status: Patient  is alert and oriented to person, place, and time.   Psychiatric:         Mood and Affect: Mood and affect normal.         Judgment: Judgment normal.       Ortho Exam      LEFT HIP: Mildly tender hip and pelvic muscles. Tender " lateral hip. Sensation grossly intact. Neurovascular intact.  Good tone of hip flexors, hip extensors, hip adductor, hip abductors. No groin pain with passive motion.     RIGHT LEG: Non-antalgic gait. Good strength to hamstrings, quadriceps, dorsiflexors and plantar flexors. Stable to varus/valgus stress. Stable anterior and posterior drawer. Negative Lachman.       Large Joint Arthrocentesis: L greater trochanteric bursa  Date/Time: 8/29/2022 10:02 AM  Consent given by: patient  Site marked: site marked  Timeout: Immediately prior to procedure a time out was called to verify the correct patient, procedure, equipment, support staff and site/side marked as required   Supporting Documentation  Indications: pain   Procedure Details  Location: hip - L greater trochanteric bursa  Needle size: 22 G  Medications administered: 9 mL lidocaine 1 %; 40 mg triamcinolone acetonide 40 MG/ML  Patient tolerance: patient tolerated the procedure well with no immediate complications              Imaging Results (Most Recent)     Procedure Component Value Units Date/Time    XR Hip With or Without Pelvis 2 - 3 View Left [[id number]] Resulted: 08/29/22 0929     Updated: 08/29/22 0936           Result Review :     X-Ray Report:  Left hip(s) X-Ray  Indication: Evaluation of left hip pain   AP and Lateral view(s)  Findings: Calcification noted of the left hip. Minimal arthritis. No acute fractures or dislocation.   Prior studies available for comparison: no     Assessment and Plan     Diagnoses and all orders for this visit:    1. Left hip pain (Primary)  -     XR Hip With or Without Pelvis 2 - 3 View Left    2. Trochanteric bursitis of left hip        At home exercises provided for the patient. She may call back for physical therapy for the left hip. She was given a left hip bursa injection, she tolerated this well.     Call or return if worsening symptoms.    Follow Up     PRN.       Patient was given instructions and counseling regarding  her condition or for health maintenance advice. Please see specific information pulled into the AVS if appropriate.     Scribed for Renzo Hackett MD by Tatiana Nunez.  08/29/22   09:54 EDT      I have personally performed the services described in this document as scribed by the above individual and it is both accurate and complete. Renzo Hackett MD 08/29/22

## 2022-09-06 ENCOUNTER — TELEPHONE (OUTPATIENT)
Dept: ONCOLOGY | Facility: HOSPITAL | Age: 47
End: 2022-09-06

## 2022-09-06 NOTE — TELEPHONE ENCOUNTER
Caller: Solange Srivastava    Relationship to patient: Self    Best call back number: [phone]    Chief complaint: PATIENT CALLING TO RESCHEDULE 9/14/22 APPT     Type of visit: FU    Requested date:CAN BE EARLIER SAME DAY, ANYTIME THE DAY BEFORE. OTHERWISE TUESDAYS WORK BEST

## 2022-09-14 ENCOUNTER — APPOINTMENT (OUTPATIENT)
Dept: ONCOLOGY | Facility: HOSPITAL | Age: 47
End: 2022-09-14

## 2022-09-19 ENCOUNTER — TELEPHONE (OUTPATIENT)
Dept: ORTHOPEDIC SURGERY | Facility: CLINIC | Age: 47
End: 2022-09-19

## 2022-09-19 NOTE — TELEPHONE ENCOUNTER
Caller: ROBIN    Relationship: SELF    Best call back number: [phone]    What orders are you requesting (i.e. lab or imaging): MRI - BILATERAL HIPS AND LOWER BACK    In what timeframe would the patient need to come in: ASAP    Where will you receive your lab/imaging services: [facility]    Additional notes: NEEDS Monday OR A Tuesday

## 2022-10-01 ENCOUNTER — HOSPITAL ENCOUNTER (EMERGENCY)
Facility: HOSPITAL | Age: 47
Discharge: HOME | End: 2022-10-01
Attending: EMERGENCY MEDICINE | Admitting: EMERGENCY MEDICINE

## 2022-10-01 VITALS
DIASTOLIC BLOOD PRESSURE: 92 MMHG | HEART RATE: 91 BPM | SYSTOLIC BLOOD PRESSURE: 114 MMHG | BODY MASS INDEX: 25.36 KG/M2 | OXYGEN SATURATION: 100 % | HEIGHT: 62 IN | TEMPERATURE: 98 F | WEIGHT: 137.79 LBS | RESPIRATION RATE: 18 BRPM

## 2022-10-01 DIAGNOSIS — M54.32 SCIATICA, LEFT SIDE: Primary | ICD-10-CM

## 2022-10-01 PROCEDURE — 96372 THER/PROPH/DIAG INJ SC/IM: CPT

## 2022-10-01 PROCEDURE — 25010000002 KETOROLAC TROMETHAMINE PER 15 MG: Performed by: NURSE PRACTITIONER

## 2022-10-01 PROCEDURE — 25010000002 DEXAMETHASONE SODIUM PHOSPHATE 10 MG/ML SOLUTION: Performed by: NURSE PRACTITIONER

## 2022-10-01 PROCEDURE — 99283 EMERGENCY DEPT VISIT LOW MDM: CPT

## 2022-10-01 RX ORDER — DEXAMETHASONE SODIUM PHOSPHATE 10 MG/ML
10 INJECTION, SOLUTION INTRAMUSCULAR; INTRAVENOUS ONCE
Status: COMPLETED | OUTPATIENT
Start: 2022-10-01 | End: 2022-10-01

## 2022-10-01 RX ORDER — METHYLPREDNISOLONE 4 MG/1
TABLET ORAL
Qty: 21 TABLET | Refills: 0 | Status: SHIPPED | OUTPATIENT
Start: 2022-10-01 | End: 2022-11-08

## 2022-10-01 RX ORDER — KETOROLAC TROMETHAMINE 10 MG/1
10 TABLET, FILM COATED ORAL EVERY 6 HOURS PRN
Qty: 15 TABLET | Refills: 0 | Status: SHIPPED | OUTPATIENT
Start: 2022-10-01 | End: 2022-12-20

## 2022-10-01 RX ORDER — KETOROLAC TROMETHAMINE 30 MG/ML
30 INJECTION, SOLUTION INTRAMUSCULAR; INTRAVENOUS ONCE
Status: COMPLETED | OUTPATIENT
Start: 2022-10-01 | End: 2022-10-01

## 2022-10-01 RX ORDER — LIDOCAINE 50 MG/G
1 PATCH TOPICAL EVERY 24 HOURS
Qty: 15 EACH | Refills: 0 | Status: SHIPPED | OUTPATIENT
Start: 2022-10-01

## 2022-10-01 RX ADMIN — DEXAMETHASONE SODIUM PHOSPHATE 10 MG: 10 INJECTION, SOLUTION INTRAMUSCULAR; INTRAVENOUS at 14:53

## 2022-10-01 RX ADMIN — KETOROLAC TROMETHAMINE 30 MG: 30 INJECTION, SOLUTION INTRAMUSCULAR; INTRAVENOUS at 14:54

## 2022-10-01 NOTE — ED PROVIDER NOTES
Subjective   History of Present Illness  Pt reports left hip and left leg pain for one month. Seen Dr. Hackett, ortho, for pain, diagnosed with calcifications on tendon in left upper leg, given injection of medication, PT ordered and she is doing that, recent chiro treatment but pain is worsening. Dr. Hackett told her he does not diagnose/image backs so she would need to follow up with her primary for this. She reports she cannot see her primary provider of 16 years because her insurance recently changed and they are now out of network.     History provided by:  Patient  Leg Pain  Location:  Hip  Time since incident:  1 month  Injury: no    Hip location:  L hip  Pain details:     Quality:  Aching and shooting    Radiates to:  L leg    Severity:  Severe    Onset quality:  Sudden    Duration:  1 month    Timing:  Constant    Progression:  Worsening  Chronicity:  New  Prior injury to area:  No  Relieved by:  Nothing  Worsened by:  Activity  Ineffective treatments:  NSAIDs  Associated symptoms: back pain and tingling    Associated symptoms: no decreased ROM, no fatigue, no fever, no itching, no muscle weakness, no neck pain, no numbness, no stiffness and no swelling    Risk factors: no concern for non-accidental trauma        Review of Systems   Constitutional: Negative for chills, fatigue and fever.   HENT: Negative for congestion, ear pain and sore throat.    Eyes: Negative for pain.   Respiratory: Negative for cough, chest tightness and shortness of breath.    Cardiovascular: Negative for chest pain.   Gastrointestinal: Negative for abdominal pain, diarrhea, nausea and vomiting.   Genitourinary: Negative for flank pain and hematuria.   Musculoskeletal: Positive for arthralgias and back pain. Negative for joint swelling, neck pain and stiffness.   Skin: Negative for itching and pallor.   Neurological: Negative for seizures and headaches.   All other systems reviewed and are negative.      Past Medical History:   Diagnosis  Date   • Anxiety    • Arthritis    • Breast cancer (HCC)    • Claustrophobia    • Cold sore    • Colitis    • Depression    • Fatty liver    • Night sweats    • Seasonal allergies    • Sinus trouble    • Sprain of ankle, right 01/25/2018   • Tobacco use        Allergies   Allergen Reactions   • Azo [Phenazopyridine] Unknown (See Comments)     Unknown    • Sulfa Antibiotics Unknown (See Comments)     Unknown        Past Surgical History:   Procedure Laterality Date   • ABDOMINOPLASTY  10/15/2019   • APPENDECTOMY     • BREAST BIOPSY Bilateral    • BREAST RECONSTRUCTION  2018    with tissue expander    • FAT GRAFTING  1/23/19,6/4/19,10/15/1    an excess skin excision    • HAND SURGERY Bilateral 2010    thumb amputation    • HYSTERECTOMY      left one ovary   • MASTECTOMY Bilateral 2018   • NIPPLE RECONSTRUCTION  06/04/2019   • THYROID SURGERY         Family History   Problem Relation Age of Onset   • Prostate cancer Brother 38   • Diabetes Brother    • Breast cancer Maternal Aunt 55   • Melanoma Maternal Aunt 45   • Stroke Father    • Diabetes Maternal Grandmother    • Breast cancer Other         malignant   • Melanoma Other    • Diabetes Maternal Aunt        Social History     Socioeconomic History   • Marital status:    Tobacco Use   • Smoking status: Current Every Day Smoker     Packs/day: 1.00     Years: 25.00     Pack years: 25.00     Types: Cigarettes   • Smokeless tobacco: Never Used   Substance and Sexual Activity   • Alcohol use: Yes     Comment: socially   • Drug use: No   • Sexual activity: Defer           Objective   Physical Exam  Vitals and nursing note reviewed.   Constitutional:       General: She is not in acute distress.     Appearance: Normal appearance. She is not toxic-appearing.   HENT:      Head: Normocephalic and atraumatic.      Mouth/Throat:      Mouth: Mucous membranes are moist.   Eyes:      General: No scleral icterus.  Cardiovascular:      Rate and Rhythm: Normal rate and regular  rhythm.      Pulses: Normal pulses.      Heart sounds: Normal heart sounds.   Pulmonary:      Effort: Pulmonary effort is normal. No respiratory distress.      Breath sounds: Normal breath sounds.   Abdominal:      General: Abdomen is flat.      Palpations: Abdomen is soft.      Tenderness: There is no abdominal tenderness.   Musculoskeletal:      Cervical back: Normal range of motion and neck supple.      Lumbar back: No swelling, edema, deformity, signs of trauma, lacerations, spasms, tenderness or bony tenderness. Normal range of motion. Positive left straight leg raise test. No scoliosis.   Skin:     General: Skin is warm and dry.   Neurological:      Mental Status: She is alert and oriented to person, place, and time. Mental status is at baseline.         Procedures           ED Course                                           MDM  Number of Diagnoses or Management Options  Sciatica, left side: new and does not require workup  Diagnosis management comments: The patient´s symptoms are consistent with musculoskeletal back pain. The patient is now resting comfortably, feels better, is alert, talkative, interactive and in no distress. The repeat examination is unremarkable and benign. The patient is neurologically intact and is ambulatory in the ED. The patient has no fever, no bowel or bladder incontinence, no saddle anesthesia, and is otherwise alert and well appearing. The history, physical exam, and diagnostics (if any) do not suggest the presence of acute spinal epidural abscess, acute spinal epidural bleed, cauda equina syndrome, abdominal aortic aneurysm, aortic dissection or other process requiring further testing, treatment or consultation in the emergency department. The vital signs have been stable. The patient's condition is stable and appropriate for discharge. The patient will pursue further outpatient evaluation with the primary care physician or other designated for consulting position as indicated  in the discharge instructions.    Risk of Complications, Morbidity, and/or Mortality  Presenting problems: low  Diagnostic procedures: minimal  Management options: low    Patient Progress  Patient progress: stable      Final diagnoses:   Sciatica, left side       ED Disposition  ED Disposition     ED Disposition   Discharge    Condition   Stable    Comment   --             Provider, No Known  [facility]  [place] [address]    In 1 week           Medication List      New Prescriptions    ketorolac 10 MG tablet  Commonly known as: TORADOL  Take 1 tablet by mouth Every 6 (Six) Hours As Needed for Moderate Pain.     lidocaine 5 %  Commonly known as: Lidoderm  Place 1 patch on the skin as directed by provider Daily. Remove & Discard patch within 12 hours or as directed by MD     methylPREDNISolone 4 MG dose pack  Commonly known as: MEDROL  Take as directed on package instructions.        Stop    ibuprofen 800 MG tablet  Commonly known as: ADVIL,MOTRIN           Where to Get Your Medications      These medications were sent to [facility]/pharmacy #[id number] - [place], [place] - [address] - [phone]  - [phone] FX  [address] [place] [address]    Hours: 24-hours Phone: [phone]   · ketorolac 10 MG tablet  · lidocaine 5 %  · methylPREDNISolone 4 MG dose pack          Grzegorz Gonsales, APRN  10/01/22 8645

## 2022-10-26 ENCOUNTER — OFFICE VISIT (OUTPATIENT)
Dept: ORTHOPEDIC SURGERY | Facility: CLINIC | Age: 47
End: 2022-10-26

## 2022-10-26 VITALS — HEIGHT: 62 IN | OXYGEN SATURATION: 98 % | BODY MASS INDEX: 25.21 KG/M2 | HEART RATE: 76 BPM | WEIGHT: 137 LBS

## 2022-10-26 DIAGNOSIS — M51.26 HERNIATION OF LEFT SIDE OF L4-L5 INTERVERTEBRAL DISC: Primary | ICD-10-CM

## 2022-10-26 DIAGNOSIS — M76.02 GLUTEAL TENDONITIS OF LEFT BUTTOCK: ICD-10-CM

## 2022-10-26 PROCEDURE — 99213 OFFICE O/P EST LOW 20 MIN: CPT | Performed by: ORTHOPAEDIC SURGERY

## 2022-10-26 PROCEDURE — 20610 DRAIN/INJ JOINT/BURSA W/O US: CPT | Performed by: ORTHOPAEDIC SURGERY

## 2022-10-26 RX ORDER — TRIAMCINOLONE ACETONIDE 40 MG/ML
40 INJECTION, SUSPENSION INTRA-ARTICULAR; INTRAMUSCULAR
Status: COMPLETED | OUTPATIENT
Start: 2022-10-26 | End: 2022-10-26

## 2022-10-26 RX ORDER — LIDOCAINE HYDROCHLORIDE 10 MG/ML
5 INJECTION, SOLUTION INFILTRATION; PERINEURAL
Status: COMPLETED | OUTPATIENT
Start: 2022-10-26 | End: 2022-10-26

## 2022-10-26 RX ADMIN — LIDOCAINE HYDROCHLORIDE 5 ML: 10 INJECTION, SOLUTION INFILTRATION; PERINEURAL at 09:23

## 2022-10-26 RX ADMIN — TRIAMCINOLONE ACETONIDE 40 MG: 40 INJECTION, SUSPENSION INTRA-ARTICULAR; INTRAMUSCULAR at 09:23

## 2022-10-26 NOTE — PROGRESS NOTES
"Chief Complaint  Follow-up of the Left Hip     Subjective      Solange Srivastava presents to [facility] for left hip. She reports pain in the groin that radiates down laterally and down the thigh. She states prolonged standing causes pain in the hip and bilateral legs. No back pain this date. She denies any injury or trauma. Left hip throbs from groin to lateral side of hip and numbness and tingling down to the top of foot. The patient fell a couple weeks ago.        Allergies   Allergen Reactions   • Azo [Phenazopyridine] Unknown (See Comments)     Unknown    • Sulfa Antibiotics Unknown (See Comments)     Unknown         Social History     Socioeconomic History   • Marital status:    Tobacco Use   • Smoking status: Every Day     Packs/day: 1.00     Years: 25.00     Pack years: 25.00     Types: Cigarettes   • Smokeless tobacco: Never   Substance and Sexual Activity   • Alcohol use: Yes     Comment: socially   • Drug use: No   • Sexual activity: Defer        Review of Systems     Objective   Vital Signs:   Pulse 76   Ht 156.2 cm (61.5\")   Wt 62.1 kg (137 lb)   SpO2 98%   BMI 25.47 kg/m²       Physical Exam  Constitutional:       Appearance: Normal appearance. Patient is well-developed and normal weight.   HENT:      Head: Normocephalic.      Right Ear: Hearing and external ear normal.      Left Ear: Hearing and external ear normal.      Nose: Nose normal.   Eyes:      Conjunctiva/sclera: Conjunctivae normal.   Cardiovascular:      Rate and Rhythm: Normal rate.   Pulmonary:      Effort: Pulmonary effort is normal.      Breath sounds: No wheezing or rales.   Abdominal:      Palpations: Abdomen is soft.      Tenderness: There is no abdominal tenderness.   Musculoskeletal:      Cervical back: Normal range of motion.   Skin:     Findings: No rash.   Neurological:      Mental Status: Patient  is alert and oriented to person, place, and time.   Psychiatric:         Mood and Affect: Mood and " affect normal.         Judgment: Judgment normal.       Ortho Exam      LEFT HIP: Mildly tender hip and pelvic muscles. Tender lateral hip. Sensation grossly intact. Neurovascular intact.  Good tone of hip flexors, hip extensors, hip adductor, hip abductors. No groin pain with passive motion. L5 distribution.      Large Joint Arthrocentesis: L greater trochanteric bursa  Date/Time: 10/26/2022 9:23 AM  Consent given by: patient  Site marked: site marked  Timeout: Immediately prior to procedure a time out was called to verify the correct patient, procedure, equipment, support staff and site/side marked as required   Supporting Documentation  Indications: pain   Procedure Details  Location: hip - L greater trochanteric bursa  Needle size: 22 G  Medications administered: 5 mL lidocaine 1 %; 40 mg triamcinolone acetonide 40 MG/ML  Patient tolerance: patient tolerated the procedure well with no immediate complications              Imaging Results (Most Recent)     None           Result Review :             Assessment and Plan     Diagnoses and all orders for this visit:    1. Herniation of left side of L4-L5 intervertebral disc (Primary)    2. Gluteal tendonitis of left buttock        Referred to spine specialist . Discussed injection to hip, patient tolerated this well.     Call or return if worsening symptoms.    Follow Up     PRN      Patient was given instructions and counseling regarding her condition or for health maintenance advice. Please see specific information pulled into the AVS if appropriate.     Scribed for Renzo Hackett MD by Mylene Segura MA.  10/26/22   08:46 EDT      I have personally performed the services described in this document as scribed by the above individual and it is both accurate and complete. Renzo Hackett MD 10/26/22

## 2022-11-01 ENCOUNTER — OFFICE VISIT (OUTPATIENT)
Dept: ONCOLOGY | Facility: HOSPITAL | Age: 47
End: 2022-11-01

## 2022-11-01 ENCOUNTER — LAB (OUTPATIENT)
Dept: ONCOLOGY | Facility: HOSPITAL | Age: 47
End: 2022-11-01

## 2022-11-01 VITALS
DIASTOLIC BLOOD PRESSURE: 58 MMHG | HEART RATE: 96 BPM | WEIGHT: 136.47 LBS | RESPIRATION RATE: 16 BRPM | OXYGEN SATURATION: 100 % | SYSTOLIC BLOOD PRESSURE: 97 MMHG | TEMPERATURE: 98.2 F | BODY MASS INDEX: 25.37 KG/M2

## 2022-11-01 DIAGNOSIS — Z78.0 POST-MENOPAUSAL: ICD-10-CM

## 2022-11-01 DIAGNOSIS — C50.919 MALIGNANT NEOPLASM OF FEMALE BREAST, UNSPECIFIED ESTROGEN RECEPTOR STATUS, UNSPECIFIED LATERALITY, UNSPECIFIED SITE OF BREAST: Primary | ICD-10-CM

## 2022-11-01 DIAGNOSIS — M54.42 ACUTE LEFT-SIDED LOW BACK PAIN WITH LEFT-SIDED SCIATICA: ICD-10-CM

## 2022-11-01 DIAGNOSIS — C50.919 MALIGNANT NEOPLASM OF FEMALE BREAST, UNSPECIFIED ESTROGEN RECEPTOR STATUS, UNSPECIFIED LATERALITY, UNSPECIFIED SITE OF BREAST: ICD-10-CM

## 2022-11-01 PROBLEM — M54.50 ACUTE LEFT-SIDED LOW BACK PAIN: Status: ACTIVE | Noted: 2022-11-01

## 2022-11-01 LAB
ALBUMIN SERPL-MCNC: 4.5 G/DL (ref 3.5–5.2)
ALBUMIN/GLOB SERPL: 1.6 G/DL
ALP SERPL-CCNC: 88 U/L (ref 39–117)
ALT SERPL W P-5'-P-CCNC: 15 U/L (ref 1–33)
ANION GAP SERPL CALCULATED.3IONS-SCNC: 10.2 MMOL/L (ref 5–15)
AST SERPL-CCNC: 16 U/L (ref 1–32)
BASOPHILS # BLD AUTO: 0.03 10*3/MM3 (ref 0–0.2)
BASOPHILS NFR BLD AUTO: 0.3 % (ref 0–1.5)
BILIRUB SERPL-MCNC: 0.5 MG/DL (ref 0–1.2)
BUN SERPL-MCNC: 23 MG/DL (ref 6–20)
BUN/CREAT SERPL: 28 (ref 7–25)
CALCIUM SPEC-SCNC: 9.6 MG/DL (ref 8.6–10.5)
CHLORIDE SERPL-SCNC: 103 MMOL/L (ref 98–107)
CO2 SERPL-SCNC: 26.8 MMOL/L (ref 22–29)
CREAT SERPL-MCNC: 0.82 MG/DL (ref 0.57–1)
DEPRECATED RDW RBC AUTO: 44.1 FL (ref 37–54)
EGFRCR SERPLBLD CKD-EPI 2021: 90 ML/MIN/1.73
EOSINOPHIL # BLD AUTO: 0.1 10*3/MM3 (ref 0–0.4)
EOSINOPHIL NFR BLD AUTO: 1 % (ref 0.3–6.2)
ERYTHROCYTE [DISTWIDTH] IN BLOOD BY AUTOMATED COUNT: 13.7 % (ref 12.3–15.4)
GLOBULIN UR ELPH-MCNC: 2.8 GM/DL
GLUCOSE SERPL-MCNC: 103 MG/DL (ref 65–99)
HCT VFR BLD AUTO: 40.4 % (ref 34–46.6)
HGB BLD-MCNC: 14 G/DL (ref 12–15.9)
IMM GRANULOCYTES # BLD AUTO: 0.03 10*3/MM3 (ref 0–0.05)
IMM GRANULOCYTES NFR BLD AUTO: 0.3 % (ref 0–0.5)
LYMPHOCYTES # BLD AUTO: 3.19 10*3/MM3 (ref 0.7–3.1)
LYMPHOCYTES NFR BLD AUTO: 30.9 % (ref 19.6–45.3)
MCH RBC QN AUTO: 30.5 PG (ref 26.6–33)
MCHC RBC AUTO-ENTMCNC: 34.7 G/DL (ref 31.5–35.7)
MCV RBC AUTO: 88 FL (ref 79–97)
MONOCYTES # BLD AUTO: 0.64 10*3/MM3 (ref 0.1–0.9)
MONOCYTES NFR BLD AUTO: 6.2 % (ref 5–12)
NEUTROPHILS NFR BLD AUTO: 6.32 10*3/MM3 (ref 1.7–7)
NEUTROPHILS NFR BLD AUTO: 61.3 % (ref 42.7–76)
NRBC BLD AUTO-RTO: 0 /100 WBC (ref 0–0.2)
PLATELET # BLD AUTO: 317 10*3/MM3 (ref 140–450)
PMV BLD AUTO: 9.8 FL (ref 6–12)
POTASSIUM SERPL-SCNC: 4 MMOL/L (ref 3.5–5.2)
PROT SERPL-MCNC: 7.3 G/DL (ref 6–8.5)
RBC # BLD AUTO: 4.59 10*6/MM3 (ref 3.77–5.28)
SODIUM SERPL-SCNC: 140 MMOL/L (ref 136–145)
WBC NRBC COR # BLD: 10.31 10*3/MM3 (ref 3.4–10.8)

## 2022-11-01 PROCEDURE — 80053 COMPREHEN METABOLIC PANEL: CPT

## 2022-11-01 PROCEDURE — 85025 COMPLETE CBC W/AUTO DIFF WBC: CPT

## 2022-11-01 PROCEDURE — 99214 OFFICE O/P EST MOD 30 MIN: CPT | Performed by: INTERNAL MEDICINE

## 2022-11-01 PROCEDURE — G0463 HOSPITAL OUTPT CLINIC VISIT: HCPCS | Performed by: INTERNAL MEDICINE

## 2022-11-01 PROCEDURE — 36415 COLL VENOUS BLD VENIPUNCTURE: CPT

## 2022-11-01 NOTE — PROGRESS NOTES
Patient  Solange Srivastava    [facility] HEMATOLOGY & ONCOLOGY    Chief Complaint  Breast Cancer    Referring Provider: No Known Provider  PCP: Provider, No Known    Subjective          Oncology/Hematology History Overview Note    Breast Cancer: Invasive Ductal Carcinoma; LEFT breast, LIQ, diagnosed     6/11/18; Grade 2, ER: 100%, KY: 100%, HER2: 1+, Ki-67: 5% staged mpT1b pN0 M0     Stage IA, Oncotype DX: 14; two foci largest 6 mm; Genetic Testing: CDH1 (+)     (Variant of Uncertain Significance)            Treatment History:            1) s/p bilateral mastectomy and left SLND 8/14/18      2) Patient deferred adjuvant endocrine therapy due to concerns for recovery from    breast reconstruction      3) Femara started with ongoing reconstruction; DEXA ordered (7/25/19)      4) Bone scan: normal. 8/6/19      5) Tolerating Femara well. Osteopenia at L2, normal LS. 11/22/19      6) Left axillary pain: plan for diag. mammo/US to eval. (5/29/20).       7) bilateral axillary US: benign LN to the left axillary area. (6/11/20)      8) Alk phos elevated at 108. normal alk phos level of 42-98. Recheck in 4     months. (5/3/21)         Malignant neoplasm of lower-inner quadrant of left breast in female, estrogen receptor positive (HCC)   6/11/2018 Initial Diagnosis    Malignant neoplasm of lower-inner quadrant of left breast in female, estrogen receptor positive (CMS/HCC)         HPI  Patient comes in today for follow-up of breast cancer.  She had been taking letrozole but stopped the medication when she developed severe low back pain.  She reports having a recent MRI.  She has plans to see a back specialist.  She does not want to restart the aromatase inhibitor even if her pain improves.  She does not have a primary care provider and does not get routine labs.  She would like to get labs again today.    Review of Systems   Constitutional: Negative for appetite change, diaphoresis, fatigue, fever,  unexpected weight gain and unexpected weight loss.   HENT: Negative for hearing loss, sore throat and voice change.    Eyes: Negative for blurred vision, double vision, pain, redness and visual disturbance.   Respiratory: Negative for cough, shortness of breath and wheezing.    Cardiovascular: Negative for chest pain, palpitations and leg swelling.   Endocrine: Negative for cold intolerance, heat intolerance, polydipsia and polyuria.   Genitourinary: Negative for decreased urine volume, difficulty urinating, frequency and urinary incontinence.   Musculoskeletal: Positive for back pain (4/10). Negative for arthralgias, joint swelling and myalgias.   Skin: Negative for color change, rash, skin lesions and wound.   Neurological: Negative for dizziness, seizures, numbness and headache.   Hematological: Negative for adenopathy. Does not bruise/bleed easily.   Psychiatric/Behavioral: Negative for depressed mood. The patient is not nervous/anxious.    All other systems reviewed and are negative.      Past Medical History:   Diagnosis Date   • Anxiety    • Arthritis    • Breast cancer (HCC)    • Claustrophobia    • Cold sore    • Colitis    • Depression    • Fatty liver    • Night sweats    • Seasonal allergies    • Sinus trouble    • Sprain of ankle, right 01/25/2018   • Tobacco use      Past Surgical History:   Procedure Laterality Date   • ABDOMINOPLASTY  10/15/2019   • APPENDECTOMY     • BREAST BIOPSY Bilateral    • BREAST RECONSTRUCTION  2018    with tissue expander    • FAT GRAFTING  1/23/19,6/4/19,10/15/1    an excess skin excision    • HAND SURGERY Bilateral 2010    thumb amputation    • HYSTERECTOMY      left one ovary   • MASTECTOMY Bilateral 2018   • NIPPLE RECONSTRUCTION  06/04/2019   • THYROID SURGERY       Social History     Socioeconomic History   • Marital status:    Tobacco Use   • Smoking status: Every Day     Packs/day: 1.00     Years: 25.00     Pack years: 25.00     Types: Cigarettes   • Smokeless  tobacco: Never   Substance and Sexual Activity   • Alcohol use: Yes     Comment: socially   • Drug use: No   • Sexual activity: Defer     Family History   Problem Relation Age of Onset   • Prostate cancer Brother 38   • Diabetes Brother    • Breast cancer Maternal Aunt 55   • Melanoma Maternal Aunt 45   • Stroke Father    • Diabetes Maternal Grandmother    • Breast cancer Other         malignant   • Melanoma Other    • Diabetes Maternal Aunt        Objective   Physical Exam  General: Alert, cooperative, no acute distress  Eyes: Anicteric sclera, PERRLA  Respiratory: normal respiratory effort  Cardiovascular: no lower extremity edema  Skin: Normal tone, no rash, no lesions  Psychiatric: Appropriate affect, intact judgment  Neurologic: No focal sensory or motor deficits, normal cognition   Musculoskeletal: Normal muscle strength and tone  Extremities: No clubbing, cyanosis, or deformities    Vitals:    11/01/22 1409   BP: 97/58   Pulse: 96   Resp: 16   Temp: 98.2 °F (36.8 °C)   SpO2: 100%   Weight: 61.9 kg (136 lb 7.4 oz)   PainSc:   4   PainLoc: Back     ECOG score: 0         PHQ-9 Total Score:         Result Review :   The following data was reviewed by: Catalina Ann MD PhD on 11/01/2022:  Lab Results   Component Value Date    HGB 13.2 03/14/2022    HCT 39.0 03/14/2022    MCV 86.9 03/14/2022     03/14/2022    WBC 6.37 03/14/2022    NEUTROABS 3.65 03/14/2022    LYMPHSABS 2.20 03/14/2022    MONOSABS 0.35 03/14/2022    EOSABS 0.14 03/14/2022    BASOSABS 0.02 03/14/2022     Lab Results   Component Value Date    GLUCOSE 93 03/14/2022    BUN 24 (H) 03/14/2022    CREATININE 0.53 (L) 03/14/2022     03/14/2022    K 3.9 03/14/2022     03/14/2022    CO2 25.0 03/14/2022    CALCIUM 9.6 03/14/2022    PROTEINTOT 6.6 03/14/2022    ALBUMIN 4.40 03/14/2022    BILITOT 0.3 03/14/2022    ALKPHOS 97 03/14/2022    AST 14 03/14/2022    ALT 12 03/14/2022          Assessment and Plan    Diagnoses and all orders for  this visit:    1. Malignant neoplasm of female breast, unspecified estrogen receptor status, unspecified laterality, unspecified site of breast (HCC) (Primary)  -     CBC & Differential; Future  -     Comprehensive Metabolic Panel; Future    2. Post-menopausal  -     DEXA Bone Density Axial; Future    3. Acute left-sided low back pain with left-sided sciatica        ER+ left breast cancer: Status post bilateral mastectomy.  Patient stopped letrozole after about 3 years and does not wish to restart or switch. I will check CBC and CMP today and f/u with her in 1 year for DEXA scan. If that is normal then she can decide if she would like to continue to f/u here or establish with a PCP for ongoing disease monitoring.     Low Back pain: The pt reports an MRI recently showed that she has arthritis. I see the report of an x-ray that confirms some calcification and minimal arthritis. There is no evidence of malignancy. She will continue to f/u with Dr. Hackett.    Patient was given instructions and counseling regarding her condition or for health maintenance advice. Please see specific information pulled into the AVS if appropriate.

## 2022-11-08 ENCOUNTER — OFFICE VISIT (OUTPATIENT)
Dept: NEUROSURGERY | Facility: CLINIC | Age: 47
End: 2022-11-08

## 2022-11-08 VITALS
SYSTOLIC BLOOD PRESSURE: 120 MMHG | HEART RATE: 89 BPM | WEIGHT: 135 LBS | HEIGHT: 62 IN | DIASTOLIC BLOOD PRESSURE: 78 MMHG | BODY MASS INDEX: 24.84 KG/M2

## 2022-11-08 DIAGNOSIS — M54.42 CHRONIC MIDLINE LOW BACK PAIN WITH LEFT-SIDED SCIATICA: ICD-10-CM

## 2022-11-08 DIAGNOSIS — M51.26 HERNIATED NUCLEUS PULPOSUS, L4-5 LEFT: Primary | ICD-10-CM

## 2022-11-08 DIAGNOSIS — G89.29 CHRONIC MIDLINE LOW BACK PAIN WITH LEFT-SIDED SCIATICA: ICD-10-CM

## 2022-11-08 PROCEDURE — 99204 OFFICE O/P NEW MOD 45 MIN: CPT | Performed by: PHYSICIAN ASSISTANT

## 2022-11-08 NOTE — PROGRESS NOTES
"Chief Complaint  Leg Pain (Left leg to foot ), Tingling (Left leg calf to foot), and Numbness (Left leg calf to foot)    Subjective          Solange Srivastava who is a 47 y.o. year old female who presents to [facility] NEUROLOGY & NEUROSURGERY for Evaluation of the Spine.     The patient complains of pain located in the Lumbar Spine.  Patients states the pain has been present for 10 months.  The pain came on acutely.  The pain scaled level is 6.  The pain does radiate. Dermatomes are located on left Lumbar at: below the knee and to the calf.  The pain is constant and waxing/waning and described as \"constant hurt\".  The pain is worse in the morning. Patient states moving, soaking in a hot tub, putting on cream makes the pain better.  Patient states Prolonged Standing and Prolonged Sitting makes the pain worse.    Associated Symptoms Include: Numbness and Tingling to the 1st-3rd toes especially. She denies weakness, or loss of bowel or bladder control.  Conservative Interventions Include: Oral Steroids that were not very effective., NSAIDs that were not very effective. and  Chiropractor that was somewhat effective.    Was this the result of an injury or accident?: Yes, Fall, January 2022, then again in October and yesterday.    History of Previous Spinal Surgery?: No     reports that she has been smoking cigarettes. She has a 6.25 pack-year smoking history. She has never used smokeless tobacco.    Review of Systems   Musculoskeletal: Positive for back pain and gait problem.        Objective   Vital Signs:   /78   Pulse 89   Ht 156.2 cm (61.5\")   Wt 61.2 kg (135 lb)   BMI 25.09 kg/m²       Physical Exam  Constitutional:       Appearance: Normal appearance.   Pulmonary:      Effort: Pulmonary effort is normal.   Musculoskeletal:         General: No tenderness.      Comments: SLR negative bilaterally   Neurological:      General: No focal deficit present.      Mental Status: She is alert and oriented " to person, place, and time.      Sensory: No sensory deficit.      Motor: No weakness.      Deep Tendon Reflexes: Reflexes normal.   Psychiatric:         Mood and Affect: Mood normal.         Behavior: Behavior normal.        Neurologic Exam     Mental Status   Oriented to person, place, and time.        Result Review     I have personally reviewed the MRI of lumbar spine without contrast from 10/17/2022 which shows a large left paracentric disc protrusion at L4-L5 which may affect the left traversing L5 nerve root.     Assessment and Plan    Diagnoses and all orders for this visit:    1. Herniated nucleus pulposus, L4-5 left (Primary)  -     Ambulatory Referral to Pain Management    2. Chronic midline low back pain with left-sided sciatica  -     Ambulatory Referral to Pain Management    Her pain is in the left leg to the calf, with numbness and tingling to the 1st-3rd toes.    She has a large left paracentric disc protrusion at L4-L5, which most likely account for her left leg complaints.    I expect she would benefit from a discectomy on the left at L4-L5.    She could consider PT. She has been doing home exercises and yoga, which has been been somewhat helpful, so I don't see that physical therapy is likely to offer her much more benefit.    She may consider a trial of LESB to assess benefit for the left leg complaints. I will refer her to [facility] in [place] to consult for this treatment.    The patient was counseled on basic recommendations for the reduction and prevention of back, neck, or spine pain in association with spinal disorders, including: cessation/avoidance of nicotine use, maintenance of a healthy BMI and weight, focusing on building/maintaining core strength through core exercise, and avoidance of activities which worsen the pain. The patient will monitor for changes in symptoms and notify our clinic of these changes as needed.    She will follow-up here PRN for failure to improve or worsening  symptoms.    Follow Up   Return if symptoms worsen or fail to improve.  Patient was given instructions and counseling regarding her condition or for health maintenance advice. Please see specific information pulled into the AVS if appropriate.

## 2022-11-29 ENCOUNTER — TELEPHONE (OUTPATIENT)
Dept: NEUROSURGERY | Facility: CLINIC | Age: 47
End: 2022-11-29

## 2022-11-29 NOTE — TELEPHONE ENCOUNTER
Caller: ROBIN     Relationship: SELF    Best call back number: [phone]    What was the call regarding: PT WAS IN AND SEEN LIANA BOYKIN ON 11/08/2022     Office Visit with Mio Boykin PA-C (11/08/2022)    PT STATES SHE WAS GIVEN 2 OPTIONS TO HAVE AN INJECTION OR TO PROCEED WITH SX.  AT THE TIME OF HER APPT SHE CHOSE TO HAVE THE INJECTION.  PT STATES NOW SHE DOES NOT WISH TO HAVE THE INJECTION AND WOULD LIKE TO KNOW THE NEXT STEPS TO PROCEED WITH SX    Do you require a callback:     PLEASE CALL PT  THANK YOU

## 2022-12-06 ENCOUNTER — OFFICE VISIT (OUTPATIENT)
Dept: NEUROSURGERY | Facility: CLINIC | Age: 47
End: 2022-12-06

## 2022-12-06 VITALS
HEIGHT: 62 IN | BODY MASS INDEX: 24.84 KG/M2 | SYSTOLIC BLOOD PRESSURE: 105 MMHG | HEART RATE: 100 BPM | DIASTOLIC BLOOD PRESSURE: 78 MMHG | WEIGHT: 135 LBS

## 2022-12-06 DIAGNOSIS — M51.26 HERNIATED NUCLEUS PULPOSUS, L4-5 LEFT: Primary | ICD-10-CM

## 2022-12-06 DIAGNOSIS — M54.42 CHRONIC MIDLINE LOW BACK PAIN WITH LEFT-SIDED SCIATICA: ICD-10-CM

## 2022-12-06 DIAGNOSIS — G89.29 CHRONIC MIDLINE LOW BACK PAIN WITH LEFT-SIDED SCIATICA: ICD-10-CM

## 2022-12-06 PROBLEM — K52.9 COLITIS: Status: ACTIVE | Noted: 2021-09-10

## 2022-12-06 PROBLEM — Z72.0 TOBACCO USE: Status: ACTIVE | Noted: 2022-12-06

## 2022-12-06 PROBLEM — S93.401A SPRAIN OF ANKLE, RIGHT: Status: ACTIVE | Noted: 2018-01-25

## 2022-12-06 PROBLEM — F40.240 CLAUSTROPHOBIA: Status: ACTIVE | Noted: 2022-12-06

## 2022-12-06 PROCEDURE — 99214 OFFICE O/P EST MOD 30 MIN: CPT | Performed by: PHYSICIAN ASSISTANT

## 2022-12-06 NOTE — H&P (VIEW-ONLY)
Patient being seen for today for Follow-up  .    Subjective    Solange Srivastava is a 47 y.o. female that presents with Follow-up  .    HPI  Previously: Last seen on 11/8/2022 for left leg pain to the calf with numbness and tingling to the first through third toes.  She had a large left paracentric disc protrusion at L4-L5 possibly accounting for the left leg complaints.  There was discussion of expected benefit from discectomy on the left at L4-L5.  She was doing home exercises and yoga and was going to defer physical therapy.  She did want to consult pain management to consider lumbar epidural steroid block trial to assess benefit for her left leg complaints.  She was referred to [facility] spine.  There was plan to follow-up as needed.    Today: She continues to have pain down the left leg to the foot with numbness and tingling to the 1st-3rd toes.    She has decided that she does not want to consider injection therapy. She has not had any actual physical therapy, but she continue home exercises and yoga.     reports that she has been smoking cigarettes. She has a 6.25 pack-year smoking history. She has never used smokeless tobacco.    Review of Systems   Musculoskeletal: Positive for back pain.   Neurological: Positive for weakness and numbness.       Objective   Vitals:    12/06/22 1541   BP: 105/78   Pulse: 100        Physical Exam  Constitutional:       Appearance: Normal appearance. She is obese.   Pulmonary:      Effort: Pulmonary effort is normal.   Musculoskeletal:         General: Tenderness (left lumbar paraspinals) present.      Comments: SLR negative bilaterally   Neurological:      General: No focal deficit present.      Mental Status: She is alert and oriented to person, place, and time.      Sensory: No sensory deficit.      Motor: No weakness.      Deep Tendon Reflexes: Reflexes normal.   Psychiatric:         Mood and Affect: Mood normal.         Behavior: Behavior normal.          Result  Review   I have personally reviewed the MRI of lumbar spine from 10/17/2022 which shows a large left paracentral disc protrusion at L4-L5 which likely affects the traversing L5 nerve root on the left.     Assessment and Plan {CC Problem List  Visit Diagnosis  ROS  Review (Popup)  [facility]  BestPractice  Medications  SmartSets  SnapShot Encounters  Media :23}   Diagnoses and all orders for this visit:    1. Herniated nucleus pulposus, L4-5 left (Primary)    2. Chronic midline low back pain with left-sided sciatica    She continues to have pain in the left leg to the foot with numbness/tingling in the 1st-3rd digits of the left foot.    She does have a large left paracentric disc protrusion at L4-L5.    She would like to discuss possible left discectomy at L4-L5 with Dr. Garcia today.    She is deferring pain management and physical therapy today.    Follow Up {Instructions Charge Capture  Follow-up Communications :23}   No follow-ups on file.

## 2022-12-06 NOTE — PROGRESS NOTES
Patient being seen for today for Follow-up  .    Subjective    Solange Srivastava is a 47 y.o. female that presents with Follow-up  .    HPI  Previously: Last seen on 11/8/2022 for left leg pain to the calf with numbness and tingling to the first through third toes.  She had a large left paracentric disc protrusion at L4-L5 possibly accounting for the left leg complaints.  There was discussion of expected benefit from discectomy on the left at L4-L5.  She was doing home exercises and yoga and was going to defer physical therapy.  She did want to consult pain management to consider lumbar epidural steroid block trial to assess benefit for her left leg complaints.  She was referred to [facility] pain and spine.  There was plan to follow-up as needed.    Today: She continues to have pain down the left leg to the foot with numbness and tingling to the 1st-3rd toes.    She has decided that she does not want to consider injection therapy. She has not had any actual physical therapy, but she continue home exercises and yoga.     reports that she has been smoking cigarettes. She has a 6.25 pack-year smoking history. She has never used smokeless tobacco.    Review of Systems   Musculoskeletal: Positive for back pain.   Neurological: Positive for weakness and numbness.       Objective   Vitals:    12/06/22 1541   BP: 105/78   Pulse: 100        Physical Exam  Constitutional:       Appearance: Normal appearance. She is obese.   Pulmonary:      Effort: Pulmonary effort is normal.   Musculoskeletal:         General: Tenderness (left lumbar paraspinals) present.      Comments: SLR negative bilaterally   Neurological:      General: No focal deficit present.      Mental Status: She is alert and oriented to person, place, and time.      Sensory: No sensory deficit.      Motor: No weakness.      Deep Tendon Reflexes: Reflexes normal.   Psychiatric:         Mood and Affect: Mood normal.         Behavior: Behavior normal.          Result  Review   I have personally reviewed the MRI of lumbar spine from 10/17/2022 which shows a large left paracentral disc protrusion at L4-L5 which likely affects the traversing L5 nerve root on the left.     Assessment and Plan {CC Problem List  Visit Diagnosis  ROS  Review (Popup)  [facility]  BestPractice  Medications  SmartSets  SnapShot Encounters  Media :23}   Diagnoses and all orders for this visit:    1. Herniated nucleus pulposus, L4-5 left (Primary)    2. Chronic midline low back pain with left-sided sciatica    She continues to have pain in the left leg to the foot with numbness/tingling in the 1st-3rd digits of the left foot.    She does have a large left paracentric disc protrusion at L4-L5.    She would like to discuss possible left discectomy at L4-L5 with Dr. Garcia today.    She is deferring pain management and physical therapy today.    Follow Up {Instructions Charge Capture  Follow-up Communications :23}   No follow-ups on file.

## 2022-12-08 PROBLEM — M51.26 HERNIATED NUCLEUS PULPOSUS, L4-5 LEFT: Status: ACTIVE | Noted: 2022-12-08

## 2022-12-20 NOTE — PRE-PROCEDURE INSTRUCTIONS
Patient instructed to have no food past midnight, clears up to 2 hours prior to arrival time. Patient instructed to wear no lotions, jewelry or piercing's day of surgery. Patient to shower with surgical soap am of surgery.  Patient to take omeprazole am of surgery.

## 2022-12-23 ENCOUNTER — ANESTHESIA EVENT (OUTPATIENT)
Dept: PERIOP | Facility: HOSPITAL | Age: 47
End: 2022-12-23

## 2022-12-23 ENCOUNTER — APPOINTMENT (OUTPATIENT)
Dept: GENERAL RADIOLOGY | Facility: HOSPITAL | Age: 47
End: 2022-12-23

## 2022-12-23 ENCOUNTER — ANESTHESIA (OUTPATIENT)
Dept: PERIOP | Facility: HOSPITAL | Age: 47
End: 2022-12-23

## 2022-12-23 ENCOUNTER — HOSPITAL ENCOUNTER (OUTPATIENT)
Facility: HOSPITAL | Age: 47
Setting detail: HOSPITAL OUTPATIENT SURGERY
Discharge: HOME | End: 2022-12-23
Attending: NEUROLOGICAL SURGERY | Admitting: NEUROLOGICAL SURGERY

## 2022-12-23 VITALS
DIASTOLIC BLOOD PRESSURE: 65 MMHG | SYSTOLIC BLOOD PRESSURE: 106 MMHG | OXYGEN SATURATION: 93 % | RESPIRATION RATE: 16 BRPM | TEMPERATURE: 97.3 F | HEART RATE: 76 BPM

## 2022-12-23 DIAGNOSIS — M51.26 HERNIATED NUCLEUS PULPOSUS, L4-5 LEFT: ICD-10-CM

## 2022-12-23 PROCEDURE — 63030 LAMOT DCMPRN NRV RT 1 LMBR: CPT | Performed by: NEUROLOGICAL SURGERY

## 2022-12-23 PROCEDURE — 25010000002 MIDAZOLAM PER 1 MG: Performed by: ANESTHESIOLOGY

## 2022-12-23 PROCEDURE — 25010000002 DEXAMETHASONE PER 1 MG: Performed by: NURSE ANESTHETIST, CERTIFIED REGISTERED

## 2022-12-23 PROCEDURE — 0 HYDROMORPHONE 1 MG/ML SOLUTION: Performed by: NURSE ANESTHETIST, CERTIFIED REGISTERED

## 2022-12-23 PROCEDURE — 25010000002 KETOROLAC TROMETHAMINE PER 15 MG: Performed by: NURSE ANESTHETIST, CERTIFIED REGISTERED

## 2022-12-23 PROCEDURE — 25010000002 METHYLPREDNISOLONE PER 40 MG: Performed by: NEUROLOGICAL SURGERY

## 2022-12-23 PROCEDURE — 25010000002 PROPOFOL 10 MG/ML EMULSION: Performed by: NURSE ANESTHETIST, CERTIFIED REGISTERED

## 2022-12-23 PROCEDURE — 25010000002 CEFAZOLIN IN DEXTROSE 2-4 GM/100ML-% SOLUTION: Performed by: NEUROLOGICAL SURGERY

## 2022-12-23 PROCEDURE — 76000 FLUOROSCOPY <1 HR PHYS/QHP: CPT

## 2022-12-23 PROCEDURE — 25010000002 ONDANSETRON PER 1 MG: Performed by: NURSE ANESTHETIST, CERTIFIED REGISTERED

## 2022-12-23 PROCEDURE — 63030 LAMOT DCMPRN NRV RT 1 LMBR: CPT | Performed by: SPECIALIST/TECHNOLOGIST, OTHER

## 2022-12-23 PROCEDURE — 25010000002 HYDROMORPHONE 1 MG/ML SOLUTION: Performed by: NURSE ANESTHETIST, CERTIFIED REGISTERED

## 2022-12-23 DEVICE — HEMOST ABS SURGIFOAM SZ12/7 2X6 7MM: Type: IMPLANTABLE DEVICE | Site: EPIDURAL SPACE | Status: FUNCTIONAL

## 2022-12-23 RX ORDER — DEXAMETHASONE SODIUM PHOSPHATE 4 MG/ML
INJECTION, SOLUTION INTRA-ARTICULAR; INTRALESIONAL; INTRAMUSCULAR; INTRAVENOUS; SOFT TISSUE AS NEEDED
Status: DISCONTINUED | OUTPATIENT
Start: 2022-12-23 | End: 2022-12-23 | Stop reason: SURG

## 2022-12-23 RX ORDER — PROMETHAZINE HYDROCHLORIDE 12.5 MG/1
25 TABLET ORAL ONCE AS NEEDED
Status: DISCONTINUED | OUTPATIENT
Start: 2022-12-23 | End: 2022-12-23 | Stop reason: HOSPADM

## 2022-12-23 RX ORDER — ONDANSETRON 2 MG/ML
INJECTION INTRAMUSCULAR; INTRAVENOUS AS NEEDED
Status: DISCONTINUED | OUTPATIENT
Start: 2022-12-23 | End: 2022-12-23 | Stop reason: SURG

## 2022-12-23 RX ORDER — BUPIVACAINE HYDROCHLORIDE AND EPINEPHRINE 5; 5 MG/ML; UG/ML
INJECTION, SOLUTION EPIDURAL; INTRACAUDAL; PERINEURAL AS NEEDED
Status: DISCONTINUED | OUTPATIENT
Start: 2022-12-23 | End: 2022-12-23 | Stop reason: HOSPADM

## 2022-12-23 RX ORDER — ONDANSETRON 2 MG/ML
4 INJECTION INTRAMUSCULAR; INTRAVENOUS ONCE AS NEEDED
Status: DISCONTINUED | OUTPATIENT
Start: 2022-12-23 | End: 2022-12-23 | Stop reason: HOSPADM

## 2022-12-23 RX ORDER — PROPOFOL 10 MG/ML
VIAL (ML) INTRAVENOUS AS NEEDED
Status: DISCONTINUED | OUTPATIENT
Start: 2022-12-23 | End: 2022-12-23 | Stop reason: SURG

## 2022-12-23 RX ORDER — LIDOCAINE HYDROCHLORIDE 20 MG/ML
INJECTION, SOLUTION EPIDURAL; INFILTRATION; INTRACAUDAL; PERINEURAL AS NEEDED
Status: DISCONTINUED | OUTPATIENT
Start: 2022-12-23 | End: 2022-12-23 | Stop reason: SURG

## 2022-12-23 RX ORDER — OXYCODONE HYDROCHLORIDE 5 MG/1
5 TABLET ORAL
Status: COMPLETED | OUTPATIENT
Start: 2022-12-23 | End: 2022-12-23

## 2022-12-23 RX ORDER — DEXMEDETOMIDINE HYDROCHLORIDE 100 UG/ML
INJECTION, SOLUTION INTRAVENOUS AS NEEDED
Status: DISCONTINUED | OUTPATIENT
Start: 2022-12-23 | End: 2022-12-23 | Stop reason: SURG

## 2022-12-23 RX ORDER — GLYCOPYRROLATE 0.2 MG/ML
INJECTION INTRAMUSCULAR; INTRAVENOUS AS NEEDED
Status: DISCONTINUED | OUTPATIENT
Start: 2022-12-23 | End: 2022-12-23 | Stop reason: SURG

## 2022-12-23 RX ORDER — MIDAZOLAM HYDROCHLORIDE 1 MG/ML
2 INJECTION INTRAMUSCULAR; INTRAVENOUS ONCE
Status: COMPLETED | OUTPATIENT
Start: 2022-12-23 | End: 2022-12-23

## 2022-12-23 RX ORDER — OXYCODONE HYDROCHLORIDE AND ACETAMINOPHEN 5; 325 MG/1; MG/1
1 TABLET ORAL EVERY 4 HOURS PRN
Qty: 25 TABLET | Refills: 0 | Status: SHIPPED | OUTPATIENT
Start: 2022-12-23 | End: 2023-01-12

## 2022-12-23 RX ORDER — ACETAMINOPHEN 500 MG
1000 TABLET ORAL ONCE
Status: COMPLETED | OUTPATIENT
Start: 2022-12-23 | End: 2022-12-23

## 2022-12-23 RX ORDER — SCOLOPAMINE TRANSDERMAL SYSTEM 1 MG/1
1 PATCH, EXTENDED RELEASE TRANSDERMAL ONCE
Status: DISCONTINUED | OUTPATIENT
Start: 2022-12-23 | End: 2022-12-23 | Stop reason: HOSPADM

## 2022-12-23 RX ORDER — PHENYLEPHRINE HCL IN 0.9% NACL 1 MG/10 ML
SYRINGE (ML) INTRAVENOUS AS NEEDED
Status: DISCONTINUED | OUTPATIENT
Start: 2022-12-23 | End: 2022-12-23 | Stop reason: SURG

## 2022-12-23 RX ORDER — PROMETHAZINE HYDROCHLORIDE 25 MG/1
25 SUPPOSITORY RECTAL ONCE AS NEEDED
Status: DISCONTINUED | OUTPATIENT
Start: 2022-12-23 | End: 2022-12-23 | Stop reason: HOSPADM

## 2022-12-23 RX ORDER — KETOROLAC TROMETHAMINE 30 MG/ML
INJECTION, SOLUTION INTRAMUSCULAR; INTRAVENOUS AS NEEDED
Status: DISCONTINUED | OUTPATIENT
Start: 2022-12-23 | End: 2022-12-23 | Stop reason: SURG

## 2022-12-23 RX ORDER — CEFAZOLIN SODIUM 2 G/100ML
2 INJECTION, SOLUTION INTRAVENOUS ONCE
Status: COMPLETED | OUTPATIENT
Start: 2022-12-23 | End: 2022-12-23

## 2022-12-23 RX ORDER — GLYCOPYRROLATE 0.2 MG/ML
0.2 INJECTION INTRAMUSCULAR; INTRAVENOUS
Status: COMPLETED | OUTPATIENT
Start: 2022-12-23 | End: 2022-12-23

## 2022-12-23 RX ORDER — ROCURONIUM BROMIDE 10 MG/ML
INJECTION, SOLUTION INTRAVENOUS AS NEEDED
Status: DISCONTINUED | OUTPATIENT
Start: 2022-12-23 | End: 2022-12-23 | Stop reason: SURG

## 2022-12-23 RX ORDER — MEPERIDINE HYDROCHLORIDE 25 MG/ML
12.5 INJECTION INTRAMUSCULAR; INTRAVENOUS; SUBCUTANEOUS
Status: DISCONTINUED | OUTPATIENT
Start: 2022-12-23 | End: 2022-12-23 | Stop reason: HOSPADM

## 2022-12-23 RX ORDER — MAGNESIUM HYDROXIDE 1200 MG/15ML
LIQUID ORAL AS NEEDED
Status: DISCONTINUED | OUTPATIENT
Start: 2022-12-23 | End: 2022-12-23 | Stop reason: HOSPADM

## 2022-12-23 RX ORDER — SODIUM CHLORIDE, SODIUM LACTATE, POTASSIUM CHLORIDE, CALCIUM CHLORIDE 600; 310; 30; 20 MG/100ML; MG/100ML; MG/100ML; MG/100ML
9 INJECTION, SOLUTION INTRAVENOUS CONTINUOUS PRN
Status: DISCONTINUED | OUTPATIENT
Start: 2022-12-23 | End: 2022-12-23 | Stop reason: HOSPADM

## 2022-12-23 RX ORDER — METHYLPREDNISOLONE ACETATE 40 MG/ML
INJECTION, SUSPENSION INTRA-ARTICULAR; INTRALESIONAL; INTRAMUSCULAR; SOFT TISSUE AS NEEDED
Status: DISCONTINUED | OUTPATIENT
Start: 2022-12-23 | End: 2022-12-23 | Stop reason: HOSPADM

## 2022-12-23 RX ADMIN — LIDOCAINE HYDROCHLORIDE 80 MG: 20 INJECTION, SOLUTION EPIDURAL; INFILTRATION; INTRACAUDAL; PERINEURAL at 08:03

## 2022-12-23 RX ADMIN — OXYCODONE HYDROCHLORIDE 5 MG: 5 TABLET ORAL at 10:02

## 2022-12-23 RX ADMIN — PROPOFOL 200 MG: 10 INJECTION, EMULSION INTRAVENOUS at 08:03

## 2022-12-23 RX ADMIN — HYDROMORPHONE HYDROCHLORIDE 0.5 MG: 1 INJECTION, SOLUTION INTRAMUSCULAR; INTRAVENOUS; SUBCUTANEOUS at 09:57

## 2022-12-23 RX ADMIN — GLYCOPYRROLATE 0.2 MG: 0.2 INJECTION INTRAMUSCULAR; INTRAVENOUS at 07:53

## 2022-12-23 RX ADMIN — SCOPALAMINE 1 PATCH: 1 PATCH, EXTENDED RELEASE TRANSDERMAL at 07:52

## 2022-12-23 RX ADMIN — HYDROMORPHONE HYDROCHLORIDE 1 MG: 1 INJECTION, SOLUTION INTRAMUSCULAR; INTRAVENOUS; SUBCUTANEOUS at 08:03

## 2022-12-23 RX ADMIN — ONDANSETRON 4 MG: 2 INJECTION INTRAMUSCULAR; INTRAVENOUS at 08:40

## 2022-12-23 RX ADMIN — OXYCODONE HYDROCHLORIDE 5 MG: 5 TABLET ORAL at 09:45

## 2022-12-23 RX ADMIN — ACETAMINOPHEN 1000 MG: 500 TABLET ORAL at 07:51

## 2022-12-23 RX ADMIN — CEFAZOLIN SODIUM 2 G: 2 INJECTION, SOLUTION INTRAVENOUS at 08:02

## 2022-12-23 RX ADMIN — Medication 100 MCG: at 08:38

## 2022-12-23 RX ADMIN — Medication 100 MCG: at 08:17

## 2022-12-23 RX ADMIN — SUGAMMADEX 200 MG: 100 INJECTION, SOLUTION INTRAVENOUS at 09:00

## 2022-12-23 RX ADMIN — MIDAZOLAM HYDROCHLORIDE 2 MG: 1 INJECTION, SOLUTION INTRAMUSCULAR; INTRAVENOUS at 07:53

## 2022-12-23 RX ADMIN — SODIUM CHLORIDE, POTASSIUM CHLORIDE, SODIUM LACTATE AND CALCIUM CHLORIDE: 600; 310; 30; 20 INJECTION, SOLUTION INTRAVENOUS at 07:26

## 2022-12-23 RX ADMIN — DEXAMETHASONE SODIUM PHOSPHATE 8 MG: 4 INJECTION, SOLUTION INTRA-ARTICULAR; INTRALESIONAL; INTRAMUSCULAR; INTRAVENOUS; SOFT TISSUE at 08:09

## 2022-12-23 RX ADMIN — ROCURONIUM BROMIDE 60 MG: 10 INJECTION, SOLUTION INTRAVENOUS at 08:04

## 2022-12-23 RX ADMIN — DEXMEDETOMIDINE HYDROCHLORIDE 10 MCG: 100 INJECTION, SOLUTION, CONCENTRATE INTRAVENOUS at 08:43

## 2022-12-23 RX ADMIN — GLYCOPYRROLATE 0.2 MG: 0.2 INJECTION INTRAMUSCULAR; INTRAVENOUS at 08:00

## 2022-12-23 RX ADMIN — KETOROLAC TROMETHAMINE 30 MG: 30 INJECTION, SOLUTION INTRAMUSCULAR; INTRAVENOUS at 08:09

## 2022-12-23 NOTE — ANESTHESIA PREPROCEDURE EVALUATION
Anesthesia Evaluation     Patient summary reviewed and Nursing notes reviewed   history of anesthetic complications: PONV  NPO Solid Status: > 8 hours  NPO Liquid Status: > 2 hours           Airway   Mallampati: III  TM distance: >3 FB  Neck ROM: full  No difficulty expected  Dental      Pulmonary - normal exam    breath sounds clear to auscultation  (+) a smoker Former,   Cardiovascular - negative cardio ROS and normal exam  Exercise tolerance: good (4-7 METS)    Rhythm: regular  Rate: normal        Neuro/Psych- negative ROS  GI/Hepatic/Renal/Endo    (+)   liver disease fatty liver disease,     Musculoskeletal     Abdominal    Substance History - negative use     OB/GYN negative ob/gyn ROS         Other   arthritis,    history of cancer (Breast)    ROS/Med Hx Other: PAT Nursing Notes unavailable.                   Anesthesia Plan    ASA 2     general     (Patient understands anesthesia not responsible for dental damage.)  intravenous induction     Anesthetic plan, risks, benefits, and alternatives have been provided, discussed and informed consent has been obtained with: patient.    Use of blood products discussed with patient .   Plan discussed with CRNA.        CODE STATUS:

## 2022-12-23 NOTE — ANESTHESIA POSTPROCEDURE EVALUATION
Patient: Solange Srivastava    Procedure Summary     Date: 12/23/22 Room / Location: [facility] OR 05 / [facility] MAIN OR    Anesthesia Start: 0759 Anesthesia Stop: 0922    Procedure: MINIMALLY INVASIVE LUMBAR DISCECTOMY, left approach, lumbar 4-lumbar 5 (Left: Spine Lumbar) Diagnosis:       Herniated nucleus pulposus, L4-5 left      (Herniated nucleus pulposus, L4-5 left [M51.26])    Surgeons: Randy Garcia MD Provider: Elizabeth Jameson MD    Anesthesia Type: general ASA Status: 2          Anesthesia Type: general    Vitals  Vitals Value Taken Time   /66 12/23/22 0947   Temp 35.9 °C (96.7 °F) 12/23/22 0916   Pulse 91 12/23/22 0950   Resp     SpO2 92 % 12/23/22 0950   Vitals shown include unvalidated device data.        Post Anesthesia Care and Evaluation    Patient location during evaluation: bedside  Patient participation: complete - patient participated  Level of consciousness: awake  Pain management: adequate    Airway patency: patent  PONV Status: none  Cardiovascular status: acceptable  Respiratory status: acceptable  Hydration status: acceptable    Comments: An Anesthesiologist personally participated in the most demanding procedures (including induction and emergence if applicable) in the anesthesia plan, monitored the course of anesthesia administration at frequent intervals and remained physically present and available for immediate diagnosis and treatment of emergencies.

## 2022-12-23 NOTE — DISCHARGE INSTRUCTIONS
DISCHARGE INSTRUCTIONS  DISCECTOMY/ LAMINECTOMY  [] MINIMALLY INVASIVE      For your surgery you had:  General anesthesia (you may have a sore throat for the first 24 hours)  You may experience dizziness, drowsiness, or light-headedness for several hours following surgery  Do not stay alone today or tonight.  Limit your activity for 24 hours.  You should not drive, operate machinery, drink alcohol, or sign legally binding documents for 24 hours or while you are taking pain medication.  Activity  For the first two weeks following surgery, remain close to home and do not do any lifting, bending or strenuous activity.  Walking is the best exercise and you can increase the amount you walk as you feel like it.  Riding in a car for short distances (15-20 minutes) is okay but do not drive until you are off all narcotic medications.  If you have a sedentary job, you may resume work as soon as you feel like it (this is rarely less than one week).  Pain Control  Take your pain medicines as needed and as prescribed.  As you are feeling better, you can decrease the prescribed pain medication and take over-the-counter medications such as Tylenol or Ibuprofen.  The prescribed pain medicines tend to be constipating so it is important to eat a well-balanced diet and take a stool softener if recommended by the doctor.  Activity such as walking also helps keep your bowels regular.  Last dose of pain medication was given at:  Oxycodone given at 1002  Tylenol given at 0751 do not exceed more than 4000mg of tylenol in a 24hr period.  Incision Care  Your sutures are under the skin and will dissolve in time.  You may shower as soon as you like.    [] You have a clear dressing, which you should remove in 3-4 days.  Beneath this dressing are steri-strips that will peel off over time.  If these steri-strips have not peeled off in 10-14 days, you may remove them.  Gently washing your incision with mild soap and rinsing with water during  your shower is all you need to do to care for the incision.  Do not scrub the incision or sit in the bathtub.  Check your incision site each day to see how it looks.  Some redness and bruising is normal for the first few days.  NOTIFY YOUR DOCTOR IF YOU EXPERIENCE ANY OF THE FOLLOWING:   You have a fever over 100.8o Fahrenheit orally  Shaking chills  Your incision is red, hot to touch, or has excessive drainage  Your incision starts to separate  Increase in bleeding or bleeding that is excessive  Nausea, vomiting and/or pain not controlled by prescribed medications  Unable to urinate in 6 hours after surgery  You may contact 's clinic at [phone] with any questions or concerns.  If unable to reach your doctor, please go to the closest emergency room.

## 2022-12-23 NOTE — OP NOTE
LUMBAR DISCECTOMY POSTERIOR WITH METRIX  Procedure Report    Patient Name:  Solange Srivastava  YOB: 1975    Date of Surgery:  12/23/2022     Indications: Left L4-5 disc herniation with radiculopathy.    Pre-op Diagnosis:   Herniated nucleus pulposus, L4-5 left [M51.26]       Post-Op Diagnosis Codes:     * Herniated nucleus pulposus, L4-5 left [M51.26]    Procedure/CPT® Codes:  91911, 70414    Procedure(s):  MINIMALLY INVASIVE LUMBAR DISCECTOMY, left approach, lumbar 4-lumbar 5    Staff:  Surgeon(s):  Randy Garcia MD    Assistant: Wanda Fairbanks RN CSA    Anesthesia: General    Estimated Blood Loss: 30 mL      Specimen:          Disc (none to pathology)        Findings: Large disc herniation contained by the ligaments.    Complications: No intraoperative complications    Description of Procedure: After informed consent was obtained, the patient was brought to the operating room. After the induction of adequate general endotracheal anesthesia, they were place in the prone position on the Sam frame. All pressure points were padded. The back was prepped and draped in the typical fashion. A timeout was performed. The midline was marked and a line roughly 1.5 cms off the midline to the left was drawn. The C-arm and a spinal needle were used to localize the L4-5 level. A 2 cm incision was then made and the Boss tubular retractor system was used to dilate the tissue docking at L4-5. The level was confirmed with fluoroscopy. The microscope was brought in and used for the remainder of the case for improved magnification and illumination. The lamina was cleared of soft tissue and removed to above the insertion of the ligamentum flavum. The ligament was resected inferiorly and laterally to the edge of the nerve root.  The L5 nerve root was retracted and there was a large prominence of the disc protrusion contained by the posterior longitudinal ligament.  The ligament was incised and herniated disc material  was removed.  After removal of disc material a small ball probe passed very easily under the L5 nerve root.  There were 2 large fragments removed.  The wound was then irrigated with normal saline.  40 mg of Depo-Medrol was placed over the spinal sac and the L5 nerve root.  The tubular retractor was removed and hemostasis assured in the soft tissue. The fascia was reapproximated using a 0 Vicryl and the the skin edges were reapproximated using a subcutaneous 2-0 Vicryl. The wound was dressed with mastisol, steri-strips, Telfa and Tegaderm. All sponge and needle counts were correct. The patient received a dose of preoperative antibiotics.     Assistant: Wanda Fairbanks RN CSA  was responsible for performing the following activities: Retraction, Suction, Irrigation, Closing and Placing Dressing and their skilled assistance was necessary for the success of this case.    Randy Garcia MD     Date: 12/23/2022  Time: 09:12 EST

## 2023-01-12 ENCOUNTER — OFFICE VISIT (OUTPATIENT)
Dept: NEUROSURGERY | Facility: CLINIC | Age: 48
End: 2023-01-12
Payer: MEDICARE

## 2023-01-12 VITALS
HEART RATE: 87 BPM | SYSTOLIC BLOOD PRESSURE: 122 MMHG | BODY MASS INDEX: 25.4 KG/M2 | DIASTOLIC BLOOD PRESSURE: 77 MMHG | HEIGHT: 62 IN | WEIGHT: 138 LBS

## 2023-01-12 DIAGNOSIS — M51.26 HERNIATED NUCLEUS PULPOSUS, L4-5 LEFT: Primary | ICD-10-CM

## 2023-01-12 DIAGNOSIS — Z98.890 STATUS POST LUMBAR SURGERY: ICD-10-CM

## 2023-01-12 PROCEDURE — 99024 POSTOP FOLLOW-UP VISIT: CPT | Performed by: PHYSICIAN ASSISTANT

## 2023-01-12 NOTE — PROGRESS NOTES
Patient being seen for today for Post-op  .    Subjective    Solange Srivastava is a 47 y.o. female that presents with Post-op  .    HPI  Previously: She is status post middle invasive lumbar discectomy using a left approach at L4-L5 on 12/23/2022.  She was complained of left leg pain to the foot with numbness and tingling to the first through third digits of the left foot.    Today: She reports pain in the back off and on rated 4/10.    She reports some occasional pain down the left leg to the calf, 1-2 episodes per week, and sometimes some numbness in the top of the left foot. Pain is worse with standing or walking.    Otherwise her pain is improved.    She has been following physical restrictions as best as she can.    She did use the Percocet after surgery, but has since discontinued.    She denies any other new complaints today.     reports that she has quit smoking. Her smoking use included cigarettes. She has a 6.25 pack-year smoking history. She has never used smokeless tobacco.    Review of Systems   Musculoskeletal: Positive for back pain.   Neurological: Positive for numbness.       Objective   Vitals:    01/12/23 0914   BP: 122/77   Pulse: 87        Physical Exam  Constitutional:       Appearance: Normal appearance.   Pulmonary:      Effort: Pulmonary effort is normal.   Musculoskeletal:         General: Tenderness present.      Comments: SLR negative bilaterally   Skin:     Comments: Left lumbar incision is well-healed without erythema or discharge   Neurological:      General: No focal deficit present.      Mental Status: She is alert and oriented to person, place, and time.      Sensory: No sensory deficit.      Motor: No weakness.      Deep Tendon Reflexes: Reflexes normal.   Psychiatric:         Mood and Affect: Mood normal.         Behavior: Behavior normal.          Result Review   None.     Assessment and Plan {CC Problem List  Visit Diagnosis  ROS  Review (Popup)  Health Maintenance  Quality   BestPractice  Medications  SmartSets  SnapShot Encounters  Media :23}   Diagnoses and all orders for this visit:    1. Herniated nucleus pulposus, L4-5 left (Primary)    2. Status post lumbar surgery    She is going to monitor her symptoms and notify us of change.    She is going to continue physical restrictions for the next 3 weeks and then resume normal activity as tolerated.    I am recommending the following restrictions: No heavy lifting greater than 10 lb, limit twisting and bending at the waist, avoidance of strenuous activity.    She will follow-up here PRN for failure to improve or worsening symptoms.  Follow Up {Instructions Charge Capture  Follow-up Communications :23}   Return if symptoms worsen or fail to improve.

## 2023-01-17 ENCOUNTER — HOSPITAL ENCOUNTER (EMERGENCY)
Facility: HOSPITAL | Age: 48
Discharge: HOME | End: 2023-01-17
Attending: EMERGENCY MEDICINE | Admitting: EMERGENCY MEDICINE
Payer: MEDICARE

## 2023-01-17 ENCOUNTER — APPOINTMENT (OUTPATIENT)
Dept: GENERAL RADIOLOGY | Facility: HOSPITAL | Age: 48
End: 2023-01-17
Payer: MEDICARE

## 2023-01-17 VITALS
BODY MASS INDEX: 25.64 KG/M2 | HEIGHT: 62 IN | RESPIRATION RATE: 17 BRPM | SYSTOLIC BLOOD PRESSURE: 99 MMHG | OXYGEN SATURATION: 96 % | WEIGHT: 139.33 LBS | TEMPERATURE: 97.9 F | DIASTOLIC BLOOD PRESSURE: 84 MMHG | HEART RATE: 79 BPM

## 2023-01-17 DIAGNOSIS — K29.70 GASTRITIS WITHOUT BLEEDING, UNSPECIFIED CHRONICITY, UNSPECIFIED GASTRITIS TYPE: Primary | ICD-10-CM

## 2023-01-17 LAB
ALBUMIN SERPL-MCNC: 4.3 G/DL (ref 3.5–5.2)
ALBUMIN/GLOB SERPL: 1.9 G/DL
ALP SERPL-CCNC: 87 U/L (ref 39–117)
ALT SERPL W P-5'-P-CCNC: 11 U/L (ref 1–33)
ANION GAP SERPL CALCULATED.3IONS-SCNC: 10.9 MMOL/L (ref 5–15)
AST SERPL-CCNC: 14 U/L (ref 1–32)
BASOPHILS # BLD AUTO: 0.03 10*3/MM3 (ref 0–0.2)
BASOPHILS NFR BLD AUTO: 0.4 % (ref 0–1.5)
BILIRUB SERPL-MCNC: 0.2 MG/DL (ref 0–1.2)
BUN SERPL-MCNC: 15 MG/DL (ref 6–20)
BUN/CREAT SERPL: 19.7 (ref 7–25)
CALCIUM SPEC-SCNC: 10 MG/DL (ref 8.6–10.5)
CHLORIDE SERPL-SCNC: 105 MMOL/L (ref 98–107)
CO2 SERPL-SCNC: 24.1 MMOL/L (ref 22–29)
CREAT SERPL-MCNC: 0.76 MG/DL (ref 0.57–1)
DEPRECATED RDW RBC AUTO: 41.7 FL (ref 37–54)
EGFRCR SERPLBLD CKD-EPI 2021: 98.6 ML/MIN/1.73
EOSINOPHIL # BLD AUTO: 0.16 10*3/MM3 (ref 0–0.4)
EOSINOPHIL NFR BLD AUTO: 2.1 % (ref 0.3–6.2)
ERYTHROCYTE [DISTWIDTH] IN BLOOD BY AUTOMATED COUNT: 13.1 % (ref 12.3–15.4)
GLOBULIN UR ELPH-MCNC: 2.3 GM/DL
GLUCOSE SERPL-MCNC: 98 MG/DL (ref 65–99)
HCT VFR BLD AUTO: 36.5 % (ref 34–46.6)
HGB BLD-MCNC: 12.8 G/DL (ref 12–15.9)
HOLD SPECIMEN: NORMAL
HOLD SPECIMEN: NORMAL
IMM GRANULOCYTES # BLD AUTO: 0.02 10*3/MM3 (ref 0–0.05)
IMM GRANULOCYTES NFR BLD AUTO: 0.3 % (ref 0–0.5)
LIPASE SERPL-CCNC: 31 U/L (ref 13–60)
LYMPHOCYTES # BLD AUTO: 2.86 10*3/MM3 (ref 0.7–3.1)
LYMPHOCYTES NFR BLD AUTO: 37.6 % (ref 19.6–45.3)
MAGNESIUM SERPL-MCNC: 2 MG/DL (ref 1.6–2.6)
MCH RBC QN AUTO: 30.6 PG (ref 26.6–33)
MCHC RBC AUTO-ENTMCNC: 35.1 G/DL (ref 31.5–35.7)
MCV RBC AUTO: 87.3 FL (ref 79–97)
MONOCYTES # BLD AUTO: 0.37 10*3/MM3 (ref 0.1–0.9)
MONOCYTES NFR BLD AUTO: 4.9 % (ref 5–12)
NEUTROPHILS NFR BLD AUTO: 4.16 10*3/MM3 (ref 1.7–7)
NEUTROPHILS NFR BLD AUTO: 54.7 % (ref 42.7–76)
NRBC BLD AUTO-RTO: 0 /100 WBC (ref 0–0.2)
NT-PROBNP SERPL-MCNC: 66 PG/ML (ref 0–450)
PLATELET # BLD AUTO: 252 10*3/MM3 (ref 140–450)
PMV BLD AUTO: 9.9 FL (ref 6–12)
POTASSIUM SERPL-SCNC: 3.7 MMOL/L (ref 3.5–5.2)
PROT SERPL-MCNC: 6.6 G/DL (ref 6–8.5)
RBC # BLD AUTO: 4.18 10*6/MM3 (ref 3.77–5.28)
SODIUM SERPL-SCNC: 140 MMOL/L (ref 136–145)
TROPONIN I SERPL-MCNC: 0 NG/ML (ref 0–0.08)
TROPONIN I SERPL-MCNC: 0 NG/ML (ref 0–0.08)
WBC NRBC COR # BLD: 7.6 10*3/MM3 (ref 3.4–10.8)
WHOLE BLOOD HOLD COAG: NORMAL
WHOLE BLOOD HOLD SPECIMEN: NORMAL

## 2023-01-17 PROCEDURE — 36415 COLL VENOUS BLD VENIPUNCTURE: CPT

## 2023-01-17 PROCEDURE — 71045 X-RAY EXAM CHEST 1 VIEW: CPT

## 2023-01-17 PROCEDURE — 83880 ASSAY OF NATRIURETIC PEPTIDE: CPT

## 2023-01-17 PROCEDURE — 99284 EMERGENCY DEPT VISIT MOD MDM: CPT

## 2023-01-17 PROCEDURE — 84484 ASSAY OF TROPONIN QUANT: CPT

## 2023-01-17 PROCEDURE — 83690 ASSAY OF LIPASE: CPT

## 2023-01-17 PROCEDURE — 93010 ELECTROCARDIOGRAM REPORT: CPT | Performed by: INTERNAL MEDICINE

## 2023-01-17 PROCEDURE — 93005 ELECTROCARDIOGRAM TRACING: CPT | Performed by: EMERGENCY MEDICINE

## 2023-01-17 PROCEDURE — 93005 ELECTROCARDIOGRAM TRACING: CPT

## 2023-01-17 PROCEDURE — 85025 COMPLETE CBC W/AUTO DIFF WBC: CPT

## 2023-01-17 PROCEDURE — 83735 ASSAY OF MAGNESIUM: CPT

## 2023-01-17 PROCEDURE — 80053 COMPREHEN METABOLIC PANEL: CPT

## 2023-01-17 RX ORDER — ALUMINA, MAGNESIA, AND SIMETHICONE 2400; 2400; 240 MG/30ML; MG/30ML; MG/30ML
15 SUSPENSION ORAL ONCE
Status: COMPLETED | OUTPATIENT
Start: 2023-01-17 | End: 2023-01-17

## 2023-01-17 RX ORDER — PANTOPRAZOLE SODIUM 40 MG/1
40 TABLET, DELAYED RELEASE ORAL DAILY
Qty: 30 TABLET | Refills: 0 | Status: SHIPPED | OUTPATIENT
Start: 2023-01-17

## 2023-01-17 RX ORDER — SODIUM CHLORIDE 0.9 % (FLUSH) 0.9 %
10 SYRINGE (ML) INJECTION AS NEEDED
Status: DISCONTINUED | OUTPATIENT
Start: 2023-01-17 | End: 2023-01-17 | Stop reason: HOSPADM

## 2023-01-17 RX ORDER — LIDOCAINE HYDROCHLORIDE 20 MG/ML
15 SOLUTION OROPHARYNGEAL ONCE
Status: COMPLETED | OUTPATIENT
Start: 2023-01-17 | End: 2023-01-17

## 2023-01-17 RX ORDER — ASPIRIN 81 MG/1
324 TABLET, CHEWABLE ORAL ONCE
Status: COMPLETED | OUTPATIENT
Start: 2023-01-17 | End: 2023-01-17

## 2023-01-17 RX ADMIN — LIDOCAINE HYDROCHLORIDE 15 ML: 20 SOLUTION ORAL at 18:50

## 2023-01-17 RX ADMIN — ALUMINUM HYDROXIDE, MAGNESIUM HYDROXIDE, AND DIMETHICONE 15 ML: 400; 400; 40 SUSPENSION ORAL at 18:50

## 2023-01-17 RX ADMIN — ASPIRIN 162 MG: 81 TABLET, CHEWABLE ORAL at 15:52

## 2023-01-17 NOTE — ED PROVIDER NOTES
Time: 3:40 PM EST  Date of encounter:  1/17/2023  Independent Historian/Clinical History and Information was obtained by:   Patient  Chief Complaint   Patient presents with   • Chest Pain       History is limited by: N/A    History of Present Illness:  Patient is a 47 y.o. year old female who presents to the emergency department for evaluation of chest pain.  Patient states she began having chest pain approximately 11:00 yesterday.  Patient states the pain has been constant since then is located on the left side of her chest and radiates to her left flank.  Patient states he initially thought the pain was coming from her hernia, but the pain has not went away so she decided to come in for evaluation because the pain was scaring her.  (Provider in triage, [place] dots PA-C)    Patient complains she has been experiencing left-sided chest pain since about 23:00 last night. She reports the pain began with associated belching. Today, she states it began radiating to the left side of her back. She denies shortness of breath, fever, chills, cough, nausea, abdominal pain, vomiting, diarrhea, leg swelling, or leg pain. She states she underwent a spinal surgery on 12/23 for herniated intervertebral discs. She endorses current tobacco use.           Patient Care Team  Primary Care Provider: Provider, No Known    Past Medical History:     Allergies   Allergen Reactions   • Azo [Phenazopyridine] Unknown (See Comments)     Unknown    • Sulfa Antibiotics Unknown (See Comments)     Unknown      Past Medical History:   Diagnosis Date   • Anxiety    • Arthritis    • Breast cancer (HCC)    • Claustrophobia    • Cold sore    • Colitis    • Depression    • Fatty liver    • Night sweats    • PONV (postoperative nausea and vomiting)     has had scope patch before   • Sciatic leg pain     left side   • Seasonal allergies    • Sinus trouble    • Sprain of ankle, right 01/25/2018   • Tobacco use      Past Surgical History:   Procedure  Laterality Date   • ABDOMINOPLASTY  10/15/2019   • AMPUTATION HAND Right    • APPENDECTOMY     • BREAST BIOPSY Bilateral    • BREAST RECONSTRUCTION  2018    with tissue expander    • FAT GRAFTING  1/23/19,6/4/19,10/15/1    an excess skin excision    • HAND SURGERY Bilateral 2010    thumb amputation    • HYSTERECTOMY      left one ovary   • LUMBAR DISCECTOMY Left 12/23/2022    Procedure: MINIMALLY INVASIVE LUMBAR DISCECTOMY, left approach, lumbar 4-lumbar 5;  Surgeon: Randy Garcia MD;  Location: [facility] MAIN OR;  Service: Neurosurgery;  Laterality: Left;   • MASTECTOMY Bilateral 2018   • NIPPLE RECONSTRUCTION  06/04/2019   • THYROID SURGERY       Family History   Problem Relation Age of Onset   • Prostate cancer Brother 38   • Diabetes Brother    • Breast cancer Maternal Aunt 55   • Melanoma Maternal Aunt 45   • Stroke Father    • Diabetes Maternal Grandmother    • Breast cancer Other         malignant   • Melanoma Other    • Diabetes Maternal Aunt        Home Medications:  Prior to Admission medications    Medication Sig Start Date End Date Taking? Authorizing Provider   ascorbic acid (VITAMIN C) 500 MG capsule controlled-release CR capsule Vitamin C 500 mg oral capsule, extended release take 1 capsule by oral route daily   Suspended    ProviderDamion MD   Calcium Carbonate (CALCIUM 600 PO) Take  by mouth.    ProviderDamion MD   Cholecalciferol 25 MCG (1000 UT) capsule Vitamin D3 1,000 unit oral capsule take 1 capsule by oral route daily   Active    ProviderDamion MD   lidocaine (Lidoderm) 5 % Place 1 patch on the skin as directed by provider Daily. Remove & Discard patch within 12 hours or as directed by MD 10/1/22   Grzegorz Gonsales, JASSI   Multiple Vitamins-Minerals (MULTIVITAMIN WITH MINERALS) tablet tablet Take 1 tablet by mouth Daily.    Damion Vanessa MD   Omega-3 Fatty Acids (FISH OIL) 1000 MG capsule capsule Take  by mouth Daily With Breakfast.    Damion Vanessa MD  "  omeprazole (priLOSEC) 20 MG capsule TAKE 1 CAPSULE BY MOUTH EVERY DAY BEFORE A MEAL 3/14/22   Charlee Willis APRN   Probiotic Product (Florajen Digestion) capsule  12/13/21   Provider, MD Damion   vitamin E 100 UNIT capsule vitamin E 100 unit oral capsule take 1 capsule by oral route daily   Active    Provider, MD Damion        Social History:   Social History     Tobacco Use   • Smoking status: Former     Packs/day: 0.25     Years: 25.00     Pack years: 6.25     Types: Cigarettes   • Smokeless tobacco: Never   Vaping Use   • Vaping Use: Never used   Substance Use Topics   • Alcohol use: Yes     Comment: socially   • Drug use: No         Review of Systems:  Review of Systems   Constitutional: Negative for chills and fever.   HENT: Negative for congestion, rhinorrhea and sore throat.    Eyes: Negative for pain and visual disturbance.   Respiratory: Positive for cough. Negative for apnea, chest tightness and shortness of breath.    Cardiovascular: Positive for chest pain. Negative for palpitations and leg swelling.   Gastrointestinal: Negative for abdominal pain, diarrhea, nausea and vomiting.   Genitourinary: Negative for difficulty urinating and dysuria.   Musculoskeletal: Positive for back pain (left upper). Negative for joint swelling and myalgias.   Skin: Negative for color change.   Neurological: Negative for seizures and headaches.   Psychiatric/Behavioral: Negative.    All other systems reviewed and are negative.       Physical Exam:  BP 99/84   Pulse 79   Temp 97.9 °F (36.6 °C) (Oral)   Resp 17   Ht 156.2 cm (61.5\")   Wt 63.2 kg (139 lb 5.3 oz)   SpO2 96%   BMI 25.90 kg/m²     Physical Exam  Vitals and nursing note reviewed.   Constitutional:       General: She is not in acute distress.     Appearance: Normal appearance. She is not toxic-appearing.      Comments: Patient is tearful   HENT:      Head: Normocephalic and atraumatic.      Jaw: There is normal jaw occlusion.      " Mouth/Throat:      Mouth: Mucous membranes are moist.   Eyes:      General: Lids are normal.      Extraocular Movements: Extraocular movements intact.      Conjunctiva/sclera: Conjunctivae normal.      Pupils: Pupils are equal, round, and reactive to light.   Cardiovascular:      Rate and Rhythm: Normal rate and regular rhythm.      Pulses: Normal pulses.      Heart sounds: Normal heart sounds.   Pulmonary:      Effort: Pulmonary effort is normal. No respiratory distress.      Breath sounds: Normal breath sounds. No wheezing or rhonchi.   Abdominal:      General: Abdomen is flat. There is no distension.      Palpations: Abdomen is soft.      Tenderness: There is no abdominal tenderness. There is no guarding or rebound.   Musculoskeletal:         General: Normal range of motion.      Cervical back: Normal range of motion and neck supple.      Right lower leg: No edema.      Left lower leg: No edema.   Skin:     General: Skin is warm and dry.   Neurological:      General: No focal deficit present.      Mental Status: She is alert and oriented to person, place, and time. Mental status is at baseline.   Psychiatric:         Mood and Affect: Mood normal.         Behavior: Behavior normal.                  Procedures:  Procedures      Medical Decision Making:      Comorbidities that affect care:    Anxiety, Cancer, Smoking    External Notes reviewed:    Previous Clinic Note and Previous Operation Note      The following orders were placed and all results were independently analyzed by me:  Orders Placed This Encounter   Procedures   • XR Chest 1 View   • [place] Draw   • Comprehensive Metabolic Panel   • Lipase   • BNP   • Magnesium   • CBC Auto Differential   • NPO Diet NPO Type: Strict NPO   • Undress & Gown   • Cardiac Monitoring   • Continuous Pulse Oximetry   • Oxygen Therapy- Nasal Cannula; 2 LPM; Titrate for SPO2: equal to or greater than, 92%   • POC Troponin I   • POC Troponin I   • POC Troponin I   • POC  Troponin I   • ECG 12 Lead ED Triage Standing Order; Chest Pain   • ECG 12 Lead ED Triage Standing Order; Chest Pain   • Insert Peripheral IV   • POC Troponin I with Hold Tube   • CBC & Differential   • Green Top (Gel)   • Lavender Top   • Gold Top - SST   • Light Blue Top   • HOLD Troponin-I Tube   • HOLD Troponin-I Tube       Medications Given in the Emergency Department:  Medications   sodium chloride 0.9 % flush 10 mL (has no administration in time range)   aspirin chewable tablet 324 mg (162 mg Oral Given 1/17/23 1552)   aluminum-magnesium hydroxide-simethicone (MAALOX MAX) 400-400-40 MG/5ML suspension 15 mL (15 mL Oral Given 1/17/23 1850)   Lidocaine Viscous HCl (XYLOCAINE) 2 % solution 15 mL (15 mL Mouth/Throat Given 1/17/23 1850)        ED Course:    The patient was initially evaluated in the triage area where orders were placed. The patient was later dispositioned by Zackary Yun MD.      The patient was advised to stay for completion of workup which includes but is not limited to communication of labs and radiological results, reassessment and plan. The patient was advised that leaving prior to disposition by a provider could result in critical findings that are not communicated to the patient.     ED Course as of 01/17/23 2034 Tue Jan 17, 2023   1541 PROVIDER IN TRIAGE  Patient was evaluated by me in triageJaiden PA-C.  Orders were placed and patient is currently awaiting final results and disposition.  [MD]   2032 EKG:    Rhythm: sinus  Rate: 61  Axis: normal  Intervals: normal  ST Segment: no elevations    EKG Comparison: unchanged    Interpreted by me   [BN]      ED Course User Index  [BN] Zackary Yun MD  [MD] Jaiden Gtz PA-C       Labs:    Lab Results (last 24 hours)     Procedure Component Value Units Date/Time    CBC & Differential [[id number]]  (Abnormal) Collected: 01/17/23 1536    Specimen: Blood from Hand, Left Updated: 01/17/23 1555    Narrative:      The  following orders were created for panel order CBC & Differential.  Procedure                               Abnormality         Status                     ---------                               -----------         ------                     CBC Auto Differential[[id number]]        Abnormal            Final result                 Please view results for these tests on the individual orders.    Comprehensive Metabolic Panel [[id number]] Collected: 01/17/23 1536    Specimen: Blood from Hand, Left Updated: 01/17/23 1619     Glucose 98 mg/dL      BUN 15 mg/dL      Creatinine 0.76 mg/dL      Sodium 140 mmol/L      Potassium 3.7 mmol/L      Chloride 105 mmol/L      CO2 24.1 mmol/L      Calcium 10.0 mg/dL      Total Protein 6.6 g/dL      Albumin 4.3 g/dL      ALT (SGPT) 11 U/L      AST (SGOT) 14 U/L      Alkaline Phosphatase 87 U/L      Total Bilirubin 0.2 mg/dL      Globulin 2.3 gm/dL      A/G Ratio 1.9 g/dL      BUN/Creatinine Ratio 19.7     Anion Gap 10.9 mmol/L      eGFR 98.6 mL/min/1.73     Narrative:      GFR Normal >60  Chronic Kidney Disease <60  Kidney Failure <15      Lipase [[id number]]  (Normal) Collected: 01/17/23 1536    Specimen: Blood from Hand, Left Updated: 01/17/23 1619     Lipase 31 U/L     BNP [[id number]]  (Normal) Collected: 01/17/23 1536    Specimen: Blood from Hand, Left Updated: 01/17/23 1611     proBNP 66.0 pg/mL     Narrative:      Among patients with dyspnea, NT-proBNP is highly sensitive for the detection of acute congestive heart failure. In addition NT-proBNP of <300 pg/ml effectively rules out acute congestive heart failure with 99% negative predictive value.      Magnesium [[id number]]  (Normal) Collected: 01/17/23 1536    Specimen: Blood from Hand, Left Updated: 01/17/23 1619     Magnesium 2.0 mg/dL     CBC Auto Differential [[id number]]  (Abnormal) Collected: 01/17/23 1536    Specimen: Blood from Hand, Left Updated: 01/17/23 1555     WBC 7.60 10*3/mm3      RBC 4.18 10*6/mm3      Hemoglobin 12.8  g/dL      Hematocrit 36.5 %      MCV 87.3 fL      MCH 30.6 pg      MCHC 35.1 g/dL      RDW 13.1 %      RDW-SD 41.7 fl      MPV 9.9 fL      Platelets 252 10*3/mm3      Neutrophil % 54.7 %      Lymphocyte % 37.6 %      Monocyte % 4.9 %      Eosinophil % 2.1 %      Basophil % 0.4 %      Immature Grans % 0.3 %      Neutrophils, Absolute 4.16 10*3/mm3      Lymphocytes, Absolute 2.86 10*3/mm3      Monocytes, Absolute 0.37 10*3/mm3      Eosinophils, Absolute 0.16 10*3/mm3      Basophils, Absolute 0.03 10*3/mm3      Immature Grans, Absolute 0.02 10*3/mm3      nRBC 0.0 /100 WBC     POC Troponin I [[id number]]  (Normal) Collected: 01/17/23 1544    Specimen: Blood Updated: 01/17/23 1557     Troponin I 0.00 ng/mL      Comment: Serial Number: [id number]:  [id number]       POC Troponin I [[id number]]  (Normal) Collected: 01/17/23 1731    Specimen: Blood Updated: 01/17/23 1744     Troponin I 0.00 ng/mL      Comment: Serial Number: [id number]:  [id number]              Imaging:    XR Chest 1 View    Result Date: 1/17/2023  PROCEDURE: XR CHEST 1 VW  COMPARISON: [facility], , CHEST PA/AP & LAT 2V, 1/03/2021, 19:34.  INDICATIONS: CHEST PAIN  FINDINGS:  LUNGS: Normal.  No significant pulmonary parenchymal abnormalities.  VASCULATURE: Normal.  Unremarkable pulmonary vasculature.  CARDIAC: Normal.  No cardiac silhouette abnormality or cardiomegaly.  MEDIASTINUM: Normal.  No visible mass or adenopathy.  PLEURA: Normal.  No effusion or pleural thickening.  BONES: Normal.  No fracture or visible bony lesion.        No acute disease.  No significant change has occurred.   LAURA SAEED MD       Electronically Signed and Approved By: LAURA SAEED MD on 1/17/2023 at 16:05                 Differential Diagnosis and Discussion:      Chest Pain:  Based on the patient's signs and symptoms, I considered aortic dissection, myocardial infaction, pulmonary embolism, cardiac tamponade, pericarditis, pneumothorax, musculoskeletal chest pain  and other differential diagnosis as an etiology of the patient's chest pain.     All labs were reviewed and analyzed by me.  CT scan radiology interpretation was reviewed by me.    MDM  Number of Diagnoses or Management Options  Gastritis without bleeding, unspecified chronicity, unspecified gastritis type  Diagnosis management comments: The patient had an EKG that shows no acute changes.  The patient´s CBC that was reviewed and interpreted by me shows no abnormalities of critical concern. Of note, there is no anemia requiring a blood transfusion and the platelet count is acceptable.  The patient´s CMP that was reviewed and interpretted by me shows no abnormalities of critical concern. Of note, the patient´s sodium and potassium are acceptable. The patient´s liver enzymes are unremarkable. The patient´s renal function (creatinine) is preserved. The patient has a normal anion gap.  Magnesium is 2.0.  Troponin is negative.  Lipase is 31.  BNP is 66.  Specifically, there are no ST elevations, t-wave changes of concern, delta waves, or rhythm abnormalities warranting admission. The patient was placed on the cardiac monitor and observed with continuous telemetry. The patient has a chest x-ray interpreted by me that is negative for pneumothorax, pneumonia, and is essentially unremarkable. The patient has had unelevated troponins on blood draw.      The patient is resting comfortably and feels better, is alert and in no distress. Repeat examination is unremarkable and benign; in particular, there's no discomfort at McBurney's point and there is no pulsatile mass. The patient has passed a po challenge in the ED and has no intractable vomiting. The history, exam, diagnostic testing, and current condition does not suggest acute appendicitis, bowel instruction, acute cholecystitis, bowel perforation, major gastrointestinal bleeding, severe diverticulitis, abdominal aortic aneurysm, mesenteric ischemia, volvulus, sepsis, or  other significant pathology that warrants further testing, continued ED treatment, admission, for surgical evaluation at this point. The vital signs have been stable. The patient´s symptoms are consistent with gastritis. The patient has no peritoneal signs consistent with a perforated ulcer. The patient was counseled to avoid NSAIDS, coffee, spicy foods, alcohol, smoking and other irritants of the stomach. The patient was advised that they may seek  of a gastroenterologist for an outpatient endoscopy. The patient does not have uncontrollable pain, intractable vomiting, or other significant symptoms. The patient's condition is stable and appropriate for discharge from the emergency department.       Amount and/or Complexity of Data Reviewed  Clinical lab tests: reviewed  Tests in the radiology section of CPT®: reviewed  Tests in the medicine section of CPT®: reviewed  Independent visualization of images, tracings, or specimens: yes    Risk of Complications, Morbidity, and/or Mortality  Presenting problems: moderate  Management options: moderate    Patient Progress  Patient progress: stable           Patient Care Considerations:    PERC: I used the PERC score to risk stratify the patient for PE and a CT of the chest was considered but ultimately not indicated in today's visit.      Consultants/Shared Management Plan:    None    Social Determinants of Health:    Patient is independent, reliable, and has access to care.       Disposition and Care Coordination:    Discharged: I considered escalation of care by admitting this patient for observation, however the patient has improved and is suitable and  stable for discharge.    I have explained the patient´s condition, diagnoses and treatment plan based on the information available to me at this time. I have answered questions and addressed any concerns. The patient has a good  understanding of the patient´s diagnosis, condition, and treatment plan as can be  expected at this point. The vital signs have been stable. The patient´s condition is stable and appropriate for discharge from the emergency department.      The patient will pursue further outpatient evaluation with the primary care physician or other designated or consulting physician as outlined in the discharge instructions. They are agreeable to this plan of care and follow-up instructions have been explained in detail. The patient has received these instructions in written format and have expressed an understanding of the discharge instructions. The patient is aware that any significant change in condition or worsening of symptoms should prompt an immediate return to this or the closest emergency department or call to 1.  I have explained discharge medications and the need for follow up with the patient/caretakers. This was also printed in the discharge instructions. Patient was discharged with the following medications and follow up:      Medication List      New Prescriptions    pantoprazole 40 MG EC tablet  Commonly known as: PROTONIX  Take 1 tablet by mouth Daily.           Where to Get Your Medications      These medications were sent to [facility]/pharmacy #[id number] - [place], [place] - [address] - [phone] [facility] [phone]   [address] [place] [address]    Hours: 24-hours Phone: [phone]   · pantoprazole 40 MG EC tablet      Provider, No Known  [facility] [id number] [phone]    In 2 days         Final diagnoses:   Gastritis without bleeding, unspecified chronicity, unspecified gastritis type        ED Disposition     ED Disposition   Discharge    Condition   Stable    Comment   --             This medical record created using voice recognition software.           Kayli Taylor  01/17/23 1911       Zackary Yun MD  01/17/23 2034

## 2023-01-18 ENCOUNTER — TELEPHONE (OUTPATIENT)
Dept: GASTROENTEROLOGY | Facility: CLINIC | Age: 48
End: 2023-01-18
Payer: MEDICARE

## 2023-01-18 NOTE — TELEPHONE ENCOUNTER
Patient called and is requesting a sooner appt due to being seen in the ER yesterday. Patient is currently scheduled for 7/06, please advise on when patient would need to be scheduled.

## 2023-01-20 LAB — QT INTERVAL: 374 MS

## 2023-01-25 LAB — QT INTERVAL: 414 MS

## 2023-02-10 ENCOUNTER — TELEPHONE (OUTPATIENT)
Dept: NEUROSURGERY | Facility: CLINIC | Age: 48
End: 2023-02-10
Payer: MEDICARE

## 2023-02-10 NOTE — TELEPHONE ENCOUNTER
Caller: Solange Srivastava    Relationship: Self    Best call back number:[phone]    What form or medical record are you requesting: LETTER STATING PATIENT HAD HERNIATED DISC FROM FALLING    Who is requesting this form or medical record from you: PATIENT FOR [facility]    How would you like to receive the form or medical records (pick-up, mail, fax):     Timeframe paperwork needed: ASAP - TODAY    Additional notes: PATIENT CALLED PATIENT IS NEEDING LETTER STATING HER HERNIATED DISC WAS THE RESULT OF A FALL SHE HAD. PATIENT STATES HER  CAN PICK LETTER UP TODAY, ADVISED LETTER MIGHT NOT BE READY TODAY BUT SENDING HIGH PRIORITY, PLEASE CALL PATIENT TO ADVISE.    THANK YOU

## 2023-02-10 NOTE — TELEPHONE ENCOUNTER
Letter created. Left voicemail to notify patient I would upload to [employer] or she may  from office.

## 2023-02-10 NOTE — TELEPHONE ENCOUNTER
"OK to write \"As the pain started right after a fall, it is reasonable to assume the fall led to the disc herniation or aroused a previously dormant condition.\""

## 2023-05-03 ENCOUNTER — TELEPHONE (OUTPATIENT)
Dept: ONCOLOGY | Facility: HOSPITAL | Age: 48
End: 2023-05-03
Payer: MEDICARE

## 2023-05-03 DIAGNOSIS — Z17.0 MALIGNANT NEOPLASM OF LOWER-INNER QUADRANT OF LEFT BREAST IN FEMALE, ESTROGEN RECEPTOR POSITIVE: Primary | ICD-10-CM

## 2023-05-03 DIAGNOSIS — M89.8X9 BONE PAIN: ICD-10-CM

## 2023-05-03 DIAGNOSIS — C50.312 MALIGNANT NEOPLASM OF LOWER-INNER QUADRANT OF LEFT BREAST IN FEMALE, ESTROGEN RECEPTOR POSITIVE: Primary | ICD-10-CM

## 2023-05-03 DIAGNOSIS — R51.9 PERSISTENT HEADACHES: ICD-10-CM

## 2023-05-03 NOTE — TELEPHONE ENCOUNTER
Caller: Solange Srivastava    Relationship: Self    Best call back number: [phone] - CAN LEAVE DETAILED VM IF NO ANSWER.     What is the best time to reach you: ANYTIME    Who are you requesting to speak with (clinical staff, provider, specific staff member): DR MAGALLANES OR NURSE    What was the call regarding: PT STATED THAT SHE IS EXPERIENCING SOME SYMPTOMS SHE IS CONCERNED ABOUT. SHE IS HAVING EXCESSIVE BURPING, HAVING PAIN IN HER LEFT UNDERARM AREA AND DOWN HER ARM, AND ALSO PAIN IN HER LEFT HIP. SHE IS ALSO HAVING SOME HEADACHES. SHE IS WORRIED THAT SOMETHING MAY BE GOING ON. SHE IS LEAVING FOR VACATION BUT WOULD LIKE TO BE SEEN WHEN SHE GETS BACK AROUND MAY 15 OR 16 IF POSSIBLE.     PLEASE CALL TO ADVISE.     Do you require a callback: YES

## 2023-05-15 ENCOUNTER — HOSPITAL ENCOUNTER (OUTPATIENT)
Dept: NUCLEAR MEDICINE | Facility: HOSPITAL | Age: 48
Discharge: HOME | End: 2023-05-15
Payer: MEDICARE

## 2023-05-15 DIAGNOSIS — Z17.0 MALIGNANT NEOPLASM OF LOWER-INNER QUADRANT OF LEFT BREAST IN FEMALE, ESTROGEN RECEPTOR POSITIVE: ICD-10-CM

## 2023-05-15 DIAGNOSIS — M89.8X9 BONE PAIN: ICD-10-CM

## 2023-05-15 DIAGNOSIS — C50.312 MALIGNANT NEOPLASM OF LOWER-INNER QUADRANT OF LEFT BREAST IN FEMALE, ESTROGEN RECEPTOR POSITIVE: ICD-10-CM

## 2023-05-15 PROCEDURE — 78306 BONE IMAGING WHOLE BODY: CPT

## 2023-05-15 PROCEDURE — A9503 TC99M MEDRONATE: HCPCS | Performed by: INTERNAL MEDICINE

## 2023-05-15 PROCEDURE — 0 TECHNETIUM MEDRONATE KIT: Performed by: INTERNAL MEDICINE

## 2023-05-15 RX ORDER — TC 99M MEDRONATE 20 MG/10ML
21 INJECTION, POWDER, LYOPHILIZED, FOR SOLUTION INTRAVENOUS
Status: COMPLETED | OUTPATIENT
Start: 2023-05-15 | End: 2023-05-15

## 2023-05-15 RX ADMIN — TC 99M MEDRONATE 21 MILLICURIE: 20 INJECTION, POWDER, LYOPHILIZED, FOR SOLUTION INTRAVENOUS at 13:17

## 2023-05-22 ENCOUNTER — HOSPITAL ENCOUNTER (OUTPATIENT)
Dept: MRI IMAGING | Facility: HOSPITAL | Age: 48
Discharge: HOME | End: 2023-05-22
Payer: MEDICARE

## 2023-05-22 ENCOUNTER — HOSPITAL ENCOUNTER (OUTPATIENT)
Dept: CT IMAGING | Facility: HOSPITAL | Age: 48
Discharge: HOME | End: 2023-05-22
Payer: MEDICARE

## 2023-05-22 DIAGNOSIS — Z17.0 MALIGNANT NEOPLASM OF LOWER-INNER QUADRANT OF LEFT BREAST IN FEMALE, ESTROGEN RECEPTOR POSITIVE: ICD-10-CM

## 2023-05-22 DIAGNOSIS — C50.312 MALIGNANT NEOPLASM OF LOWER-INNER QUADRANT OF LEFT BREAST IN FEMALE, ESTROGEN RECEPTOR POSITIVE: ICD-10-CM

## 2023-05-22 DIAGNOSIS — R51.9 PERSISTENT HEADACHES: ICD-10-CM

## 2023-05-22 DIAGNOSIS — M89.8X9 BONE PAIN: ICD-10-CM

## 2023-05-22 PROCEDURE — 71260 CT THORAX DX C+: CPT

## 2023-05-22 PROCEDURE — 25510000001 IOPAMIDOL PER 1 ML: Performed by: INTERNAL MEDICINE

## 2023-05-22 PROCEDURE — 0 GADOBENATE DIMEGLUMINE 529 MG/ML SOLUTION: Performed by: INTERNAL MEDICINE

## 2023-05-22 PROCEDURE — 70553 MRI BRAIN STEM W/O & W/DYE: CPT

## 2023-05-22 PROCEDURE — 74177 CT ABD & PELVIS W/CONTRAST: CPT

## 2023-05-22 PROCEDURE — A9577 INJ MULTIHANCE: HCPCS | Performed by: INTERNAL MEDICINE

## 2023-05-22 RX ADMIN — GADOBENATE DIMEGLUMINE 12 ML: 529 INJECTION, SOLUTION INTRAVENOUS at 17:23

## 2023-05-22 RX ADMIN — IOPAMIDOL 100 ML: 755 INJECTION, SOLUTION INTRAVENOUS at 15:53

## 2023-05-26 ENCOUNTER — TELEPHONE (OUTPATIENT)
Dept: ONCOLOGY | Facility: HOSPITAL | Age: 48
End: 2023-05-26
Payer: MEDICARE

## 2023-05-26 NOTE — TELEPHONE ENCOUNTER
Caller: Solange Srivastava    Relationship: Self    Best call back number: [phone]    What was the call regarding: PATIENT STATES THAT DR MAGALLANES WAS SUPPOSED TO HAVE CALLED THE RADIOLOGIST TO CHECK ON HER SCANS AND CALL HER BACK    Do you require a callback: YES

## 2023-05-31 ENCOUNTER — HOSPITAL ENCOUNTER (OUTPATIENT)
Dept: OCCUPATIONAL THERAPY | Facility: HOSPITAL | Age: 48
Setting detail: THERAPIES SERIES
Discharge: HOME | End: 2023-05-31

## 2023-05-31 DIAGNOSIS — L90.5 SCAR CONDITION AND FIBROSIS OF SKIN: ICD-10-CM

## 2023-05-31 DIAGNOSIS — C50.412 MALIGNANT NEOPLASM OF UPPER-OUTER QUADRANT OF LEFT BREAST IN FEMALE, ESTROGEN RECEPTOR POSITIVE: ICD-10-CM

## 2023-05-31 DIAGNOSIS — R52 PAIN: ICD-10-CM

## 2023-05-31 DIAGNOSIS — M25.60 JOINT STIFFNESS: ICD-10-CM

## 2023-05-31 DIAGNOSIS — Z91.89 AT RISK FOR LYMPHEDEMA: Primary | ICD-10-CM

## 2023-05-31 DIAGNOSIS — Z17.0 MALIGNANT NEOPLASM OF UPPER-OUTER QUADRANT OF LEFT BREAST IN FEMALE, ESTROGEN RECEPTOR POSITIVE: ICD-10-CM

## 2023-05-31 PROCEDURE — 97165 OT EVAL LOW COMPLEX 30 MIN: CPT | Performed by: OCCUPATIONAL THERAPIST

## 2023-05-31 NOTE — THERAPY EVALUATION
Outpatient Occupational Therapy Lymphedema Initial Evaluation   [facility]     Patient Name: Solange Srivastava  : 1975  MRN: [id number]  Today's Date: 2023      Visit Date: 2023    Patient Active Problem List   Diagnosis   • Malignant neoplasm of lower-inner quadrant of left breast in female, estrogen receptor positive   • Anxiety   • Arthritis   • Breast cancer   • Cold sore   • Fatty liver   • Colitis   • Depression   • Night sweats   • Seasonal allergies   • Sinus trouble   • Sprain of ankle   • Acute left-sided low back pain   • Claustrophobia   • Sprain of ankle, right   • Tobacco use        Past Medical History:   Diagnosis Date   • Anxiety    • Arthritis    • Breast cancer    • Claustrophobia    • Cold sore    • Colitis    • Depression    • Fatty liver    • Night sweats    • PONV (postoperative nausea and vomiting)     has had scope patch before   • Sciatic leg pain     left side   • Seasonal allergies    • Sinus trouble    • Sprain of ankle, right 2018   • Tobacco use         Past Surgical History:   Procedure Laterality Date   • ABDOMINOPLASTY  10/15/2019   • AMPUTATION HAND Right    • APPENDECTOMY     • BREAST BIOPSY Bilateral    • BREAST RECONSTRUCTION      with tissue expander    • FAT GRAFTING  19,19,10/15/1    an excess skin excision    • HAND SURGERY Bilateral     thumb amputation    • HYSTERECTOMY      left one ovary   • LUMBAR DISCECTOMY Left 2022    Procedure: MINIMALLY INVASIVE LUMBAR DISCECTOMY, left approach, lumbar 4-lumbar 5;  Surgeon: Randy Garcia MD;  Location: [facility];  Service: Neurosurgery;  Laterality: Left;   • MASTECTOMY Bilateral    • NIPPLE RECONSTRUCTION  2019   • THYROID SURGERY           Visit Dx:     ICD-10-CM ICD-9-CM   1. At risk for lymphedema  Z91.89 V49.89   2. Malignant neoplasm of upper-outer quadrant of left breast in female, estrogen receptor positive  C50.412 174.4    Z17.0 V86.0   3. Scar condition and fibrosis  of skin  L90.5 709.2   4. Joint stiffness  M25.60 719.50   5. Pain  R52 780.96        Patient History     Row Name 05/31/23 1300             History    Chief Complaint --  Swelling in the left chest wall  -TD      Brief Description of Current Complaint Solange is 48 year old female with a diagnosis of left breast cancer.  Solange underwent a bilateral mastectomy with reconstruction in 8/2018.  Patient had 5 lymphnodes removed at this time.  -TD         Fall Risk Assessment    Any falls in the past year: Yes  -TD      Does patient have a fear of falling Yes (comment)  -TD         Services    Are you currently receiving Home Health services No  -TD      Do you plan to receive Home Health services in the near future No  -TD         Daily Activities    Primary Language English  -TD      Are you able to read Yes  -TD      Are you able to write Yes  -TD      How does patient learn best? Listening;Reading;Pictures/Video;Demonstration  -TD         Safety    Are you being hurt, hit, or frightened by anyone at home or in your life? No  -TD      Are you being neglected by a caregiver No  -TD      Have you had any of the following issues with N/A  -TD            User Key  (r) = Recorded By, (t) = Taken By, (c) = Cosigned By    Initials Name Provider Type    TD Alina Del Cid OT Occupational Therapist                 Lymphedema     Row Name 05/31/23 1400             Lymphedema Assessment    Lymphedema Classification LUE:;at risk/stage 0  -TD      Lymphedema Cancer Related Sx bilateral;simple mastectomy;reconstructive;sentinel node biopsy  -TD      Lymphedema Surgery Comments 8/2018  -TD      Lymph Nodes Removed # 5  -TD      Chemo Received no  -TD      Radiation Therapy Received no  -TD         LLIS - Physical Concerns    The amount of pain associated with my lymphedema is: 0  -TD      The amount of limb heaviness associated with my lymphedema is: 0  -TD      The amount of skin tightness associated with my lymphedema is: 0  -TD       "The size of my swollen limb(s) seems: 0  -TD      Lymphedema affects the movement of my swollen limb(s): 0  -TD      The strength in my swollen limb(s) is: 0  -TD         LLIS - Psychosocial Concerns    Lymphedema affects my body image (i.e., \"how I think I look\"). 0  -TD      Lymphedema affects my socializing with others. 0  -TD      Lymphedema affects my intimate relations with spouse or partner (rate 0 if not applicable 0  -TD      Lymphedema \"gets me down\" (i.e., depression, frustration, or anger) 0  -TD      I must rely on others for help due to my lymphedema. 0  -TD      I know what to do to manage my lymphedema 4  -TD         LLIS - Functional Concerns    Lymphedema affects my ability to perform self-care activities (i.e. eating, dressing, hygiene) 0  -TD      Lymphedema affects my ability to perform routine home or work-related activities. 0  -TD      Lymphedema affects my performance of preferred leisure activities. 0  -TD      Lymphedema affects proper fit of clothing/shoes 0  -TD      Lymphedema affects my sleep 0  -TD         Posture/Observations    Posture- WNL Posture is WNL  -TD         General ROM    GENERAL ROM COMMENTS BUE are WFL  -TD         MMT (Manual Muscle Testing)    General MMT Comments BUE are WFL  -TD         Skin Changes/Observations    Location/Assessment Upper Quadrant  -TD      Upper Quadrant Conditions bilateral:;intact;clean  Lymphatic cord noted in the left axilla.  Therapist was able to access and break the cord.  -TD         Lymphedema Life Impact Scale Totals    A.  Total Q1 - Q17 (Do not include Q18) 4  -TD      B.  Total number of questions answered (Q1-Q17) 17  -TD      C. Divide A by B 0.24  -TD      D. Multiple C by 25 6  -TD            User Key  (r) = Recorded By, (t) = Taken By, (c) = Cosigned By    Initials Name Provider Type    TD Alina Del Cid OT Occupational Therapist                        Therapy Education  Education Details: Solange was given HEP for stretchging " BUE.  Given: HEP, Symptoms/condition management  Program: New  How Provided: Verbal  Provided to: Patient  Level of Understanding: Teach back education performed         OT Goals     Row Name 05/31/23 1431          Time Calculation    OT Goal Re-Cert Due Date 06/30/23  -TD           User Key  (r) = Recorded By, (t) = Taken By, (c) = Cosigned By    Initials Name Provider Type    Alina Rhodes OT Occupational Therapist            UQLymphgoals:   1. Uncontrolled lymphedema of Left upper extremity/upper quadrant.  LTG1:  90 days:  Volumetric measurements of left upper extremity/upper quadrant will be decreased by 5 % or until plateau in reduction is achieved to improve functional mobility.           STATUS:  New   STG1a:  30 days:  Volumetric measurements of left upper extremity/upper quadrant will be decreased by 3 % bilaterally to improve functional mobility.    STATUS:  New  TREATMENT:  Manual Therapy, Therapeutic exercise, ADL/self-care therapy, Bioimpedence Fluid Analysis, Orthotic management and training, Therapeutic Activity, wound dressing as needed, Modalities: TENS, NMES, Ultrasound, Fluidotherapy, and Patient and family/caregiver education.    2. Patient with decreased ADL/Self-Care routine for lymphedema management.   LTG 2:  90 days:  Patient/caregiver will independently verbalize/demonstrate ADL/Self-Care routine for lymphedema management.           STATUS:  New   STG 2a:  30 days:  Patient/caregiver will independently perform or verbally dictate compression wrapping technique of the upper extremity/upper quadrant.           STATUS:  New   STG2b:  30 days:  Patient will independently verbalize signs and symptoms of infection.                    STATUS:  New   STG2c:  30 days:  Patient will independently demonstrate/verbalize proper skin care routine to prevent infection and or wound development.            STATUS:  New  STG2d:  30 days:  Patient will independently perform teach back of HEP routine.            STATUS: New  STG2e:  30 days:  Patient/caregiver will obtain properly fitting compression orthosis, will demonstrate don/doff skill of compression orthosis with modified independence, and independently verbalize wear schedule.          STATUS: New   STG2f: 30 days: Patient/caregiver will independently perform self-manual lymphatic drainage of the upper extremity/upper quadrant.          STATUS: New  TREATMENT:  Therapeutic Activity, Patient/Caregiver Education, ADL retraining, Manual Therapy, Orthotic Management and training, Modalities: TENS, NMES, Ultrasound, Fluidotherapy Wound dressing if necessary, Therapeutic Exercise, Modalities    3. Self-Care Limitations     LTG 3: 90 days:  The patient will demonstrate improved quality of life by achieving a score of 17 on the Lymphedema Life Impact Scale.           STATUS:  New   STG 3a: 30 days:  The patient will demonstrate improved quality of life by achieving a score of 20 on the Lymphedema Life Impact Scale.           STATUS:  New  TREATMENT:  Manual therapy, therapeutic exercise, therapeutic activity, ADL retraining, Patient/caregiver education, Modalities: TENS, NMES, Ultrasound, Fluidotherapy, Orthotic Management and Training, wound dressing as needed.         OT Assessment/Plan     Row Name 05/31/23 1428 05/31/23 1427       OT Assessment    Functional Limitations -- Limitations in functional capacity and performance  -TD    Impairments -- Impaired lymphatic circulation;Range of motion  -TD    Assessment Comments Solange is 48 year old female with a diagnosis of left breast cancer.  Solange underwent a bilateral mastectomy with reconstruction in 8/2018.  Patient had 5 lymphnodes removed at this time.  Solange is noted to have lymphatic cording under the left axilla.  Therapist was able to access the cord and treat. Pt given HEP to complete stretching.  Patient would benefit from continued skilled OT to prevent increased staging of lymphedema, decreased AROM, and  increased pain.  -TD --    OT Diagnosis at risk for lymphedema  -TD --    OT Rehab Potential Good  -TD --    Patient/caregiver participated in establishment of treatment plan and goals Yes  -TD --    Patient would benefit from skilled therapy intervention Yes  -TD --       OT Plan    OT Frequency --  see duration  -TD --    Predicted Duration of Therapy Intervention (OT) Pt will be seen in 1 month to assess for left axilla.  -TD --    Planned CPT's? OT EVAL LOW COMPLEXITY: 10129;OT RE-EVAL: 53299;OT THER ACT EA 15 MIN: 33013ZA;OT THER PROC EA 15 MIN: 15835MN;OT SELF CARE/MGMT/TRAIN 15 MIN: 61710;OT MANUAL THERAPY EA 15 MIN: 20304;OT ORTHOTIC MGMT/TRAIN EA 15 MIN: 16359;OT ORTHO/PROSTHET CHECKOUT EA 15 MIN: 75841;OT CARE PLAN EA 15 MIN  -TD --    Planned Therapy Interventions (Optional Details) manual therapy techniques;stretching;strengthening;ROM (Range of Motion);prosthetic fitting/training;patient/family education;orthotic fitting/training  -TD --    OT Plan Comments Initiate POC  -TD --          User Key  (r) = Recorded By, (t) = Taken By, (c) = Cosigned By    Initials Name Provider Type    TD Alina Del Cid OT Occupational Therapist              OT eval complexity:   Examination:   Performance Deficits include:  dressing, bathing, grooming   # deficits affecting performance:  3  Decision Making:   Treatment Options Considered : adaption, remediation, prevention  # Treatment options considered : 3  Modification Made for: environment, task   Level of Task Modification: min/mod  Comorbidity Affect Performance: none  How is performance affected: n/a  Evaluation Level Determined:  low            Time Calculation:   Untimed Charges  OT Eval/Re-eval Minutes: 55  Total Minutes  Untimed Charges Total Minutes: 55   Total Minutes: 55     Therapy Charges for Today     Code Description Service Date Service Provider Modifiers Qty    [id number]  OT EVAL LOW COMPLEXITY 4 5/31/2023 Alina Del Cid OT GO 1                     Alina Del Cid, OT  5/31/2023

## 2023-09-26 ENCOUNTER — HOSPITAL ENCOUNTER (OUTPATIENT)
Dept: BONE DENSITY | Facility: HOSPITAL | Age: 48
Discharge: HOME | End: 2023-09-26
Admitting: INTERNAL MEDICINE
Payer: MEDICARE

## 2023-09-26 DIAGNOSIS — Z78.0 POST-MENOPAUSAL: ICD-10-CM

## 2023-09-26 PROCEDURE — 77080 DXA BONE DENSITY AXIAL: CPT

## 2023-11-07 ENCOUNTER — OFFICE VISIT (OUTPATIENT)
Dept: ONCOLOGY | Facility: HOSPITAL | Age: 48
End: 2023-11-07
Attending: STUDENT IN AN ORGANIZED HEALTH CARE EDUCATION/TRAINING PROGRAM
Payer: MEDICARE

## 2023-11-07 VITALS
TEMPERATURE: 97.1 F | RESPIRATION RATE: 16 BRPM | WEIGHT: 137.13 LBS | HEIGHT: 61 IN | BODY MASS INDEX: 25.89 KG/M2 | DIASTOLIC BLOOD PRESSURE: 68 MMHG | SYSTOLIC BLOOD PRESSURE: 119 MMHG | OXYGEN SATURATION: 98 % | HEART RATE: 88 BPM

## 2023-11-07 DIAGNOSIS — Z72.0 TOBACCO ABUSE DISORDER: Primary | ICD-10-CM

## 2023-11-07 DIAGNOSIS — C50.312 MALIGNANT NEOPLASM OF LOWER-INNER QUADRANT OF LEFT BREAST IN FEMALE, ESTROGEN RECEPTOR POSITIVE: ICD-10-CM

## 2023-11-07 DIAGNOSIS — Z17.0 MALIGNANT NEOPLASM OF LOWER-INNER QUADRANT OF LEFT BREAST IN FEMALE, ESTROGEN RECEPTOR POSITIVE: ICD-10-CM

## 2023-11-07 PROCEDURE — G0463 HOSPITAL OUTPT CLINIC VISIT: HCPCS | Performed by: STUDENT IN AN ORGANIZED HEALTH CARE EDUCATION/TRAINING PROGRAM

## 2023-11-07 RX ORDER — BUPROPION HYDROCHLORIDE 150 MG/1
150 TABLET, EXTENDED RELEASE ORAL 2 TIMES DAILY
Qty: 60 TABLET | Refills: 4 | Status: SHIPPED | OUTPATIENT
Start: 2023-11-07

## 2023-11-07 NOTE — PROGRESS NOTES
Chief Complaint/Reason for Referral:   BREAST CA    Maria Fernanda Hill,*  Maria Fernanda Hill MD        Subjective    History of Present Illness    MsPatricia Srivastava presents for follow up for breast cancer (invasive ductal carcinoma) diagnosed in June of 2018. Completed 3 years of Letrozole therapy ending sometime around 2021 and then stopped due to side effects. Completed bilateral mastectomy. Completed breast reconstruction.       Restaging scans in May were negative for any metastatic disease.     Reports she is doing well in the interim. She would like to quit smoking and inquires about smoking cessation today. She has used Chantix in the past. Does not want to gain any weight.     Recently had dexa scan completed on 9/26/2023 and does show osteopenia.     In June of this year, is her 5 year breast cancer anniversary.       Cancer Staging   No matching staging information was found for the patient.       Treatment intent: curative    Oncology/Hematology History Overview Note    Breast Cancer: Invasive Ductal Carcinoma; LEFT breast, LIQ, diagnosed     6/11/18; Grade 2, ER: 100%, CA: 100%, HER2: 1+, Ki-67: 5% staged mpT1b pN0 M0     Stage IA, Oncotype DX: 14; two foci largest 6 mm; Genetic Testing: CDH1 (+)     (Variant of Uncertain Significance)            Treatment History:      -s/p bilateral mastectomy and left SLND 8/14/18      -Femara started with ongoing reconstruction (7/25/19) Completed 3 years before electing to stop        Malignant neoplasm of lower-inner quadrant of left breast in female, estrogen receptor positive   6/11/2018 Initial Diagnosis    Malignant neoplasm of lower-inner quadrant of left breast in female, estrogen receptor positive (CMS/HCC)     Breast cancer   9/10/2021 Initial Diagnosis    Breast cancer         Review of Systems   Constitutional:  Negative for appetite change, diaphoresis, fatigue, fever, unexpected weight gain and unexpected weight loss.   HENT:  Negative for hearing  loss, mouth sores, sore throat, swollen glands, trouble swallowing and voice change.    Eyes:  Negative for blurred vision.   Respiratory:  Negative for cough, shortness of breath and wheezing.    Cardiovascular:  Negative for chest pain and palpitations.   Gastrointestinal:  Negative for abdominal pain, blood in stool, constipation, diarrhea, nausea and vomiting.   Endocrine: Negative for cold intolerance and heat intolerance.   Genitourinary:  Negative for difficulty urinating, dysuria, frequency, hematuria and urinary incontinence.   Musculoskeletal:  Negative for arthralgias, back pain and myalgias.   Skin:  Negative for rash, skin lesions and wound.   Neurological:  Negative for dizziness, seizures, weakness, numbness and headache.   Hematological:  Does not bruise/bleed easily.   Psychiatric/Behavioral:  Negative for depressed mood. The patient is not nervous/anxious.    All other systems reviewed and are negative.      Current Outpatient Medications on File Prior to Visit   Medication Sig Dispense Refill    ascorbic acid (VITAMIN C) 500 MG capsule controlled-release CR capsule Vitamin C 500 mg oral capsule, extended release take 1 capsule by oral route daily   Suspended      Calcium Carbonate (CALCIUM 600 PO) Take  by mouth.      Cholecalciferol 25 MCG (1000 UT) capsule Vitamin D3 1,000 unit oral capsule take 1 capsule by oral route daily   Active      methylcellulose (Citrucel) oral powder Take 2 application  by mouth Daily. 850 g 1    Multiple Vitamins-Minerals (MULTIVITAMIN WITH MINERALS) tablet tablet Take 1 tablet by mouth Daily.      Omega-3 Fatty Acids (FISH OIL) 1000 MG capsule capsule Take  by mouth Daily With Breakfast.      omeprazole (priLOSEC) 20 MG capsule Take 1 capsule by mouth Daily. before a meal 90 capsule 2    Probiotic Product (Florajen Digestion) capsule       vitamin E 100 UNIT capsule vitamin E 100 unit oral capsule take 1 capsule by oral route daily   Active       No current  facility-administered medications on file prior to visit.       Allergies   Allergen Reactions    Azo [Phenazopyridine] Unknown (See Comments)     Unknown     Sulfa Antibiotics Unknown (See Comments)     Unknown      Past Medical History:   Diagnosis Date    Anxiety     Arthritis     Breast cancer     Claustrophobia     Cold sore     Colitis     Depression     Fatty liver     Night sweats     PONV (postoperative nausea and vomiting)     has had scope patch before    Sciatic leg pain     left side    Seasonal allergies     Sinus trouble     Sprain of ankle, right 01/25/2018    Tobacco use      Past Surgical History:   Procedure Laterality Date    ABDOMINOPLASTY  10/15/2019    AMPUTATION HAND Right     APPENDECTOMY      BREAST BIOPSY Bilateral     BREAST RECONSTRUCTION  2018    with tissue expander     FAT GRAFTING  1/23/19,6/4/19,10/15/1    an excess skin excision     HAND SURGERY Bilateral 2010    thumb amputation     HYSTERECTOMY      left one ovary    LUMBAR DISCECTOMY Left 12/23/2022    Procedure: MINIMALLY INVASIVE LUMBAR DISCECTOMY, left approach, lumbar 4-lumbar 5;  Surgeon: Randy Garcia MD;  Location: [facility];  Service: Neurosurgery;  Laterality: Left;    MASTECTOMY Bilateral 2018    NIPPLE RECONSTRUCTION  06/04/2019    THYROID SURGERY      UPPER GASTROINTESTINAL ENDOSCOPY       Social History     Socioeconomic History    Marital status:    Tobacco Use    Smoking status: Some Days     Packs/day: 0.25     Years: 25.00     Additional pack years: 0.00     Total pack years: 6.25     Types: Cigarettes    Smokeless tobacco: Never   Vaping Use    Vaping Use: Never used   Substance and Sexual Activity    Alcohol use: Yes     Comment: socially    Drug use: No    Sexual activity: Defer     Family History   Problem Relation Age of Onset    Prostate cancer Brother 38    Diabetes Brother     Breast cancer Maternal Aunt 55    Melanoma Maternal Aunt 45    Stroke Father     Diabetes Maternal Grandmother      "Breast cancer Other         malignant    Melanoma Other     Diabetes Maternal Aunt      Immunization History   Administered Date(s) Administered    Hepatitis A 04/21/2018, 10/24/2018    Pneumococcal Polysaccharide (PPSV23) 08/12/2021       Tobacco Use: High Risk (11/7/2023)    Patient History     Smoking Tobacco Use: Some Days     Smokeless Tobacco Use: Never     Passive Exposure: Not on file       Objective     Physical Exam  Vitals and nursing note reviewed.   Constitutional:       Appearance: Normal appearance. She is normal weight.   HENT:      Head: Normocephalic.      Nose: Nose normal.      Mouth/Throat:      Mouth: Mucous membranes are moist.   Eyes:      Pupils: Pupils are equal, round, and reactive to light.   Cardiovascular:      Rate and Rhythm: Normal rate and regular rhythm.      Pulses: Normal pulses.      Heart sounds: Normal heart sounds.   Pulmonary:      Effort: Pulmonary effort is normal.      Breath sounds: Normal breath sounds.   Abdominal:      General: Bowel sounds are normal.      Palpations: Abdomen is soft.   Musculoskeletal:         General: Normal range of motion.      Cervical back: Normal range of motion and neck supple.   Skin:     General: Skin is warm.      Capillary Refill: Capillary refill takes less than 2 seconds.   Neurological:      General: No focal deficit present.      Mental Status: She is alert and oriented to person, place, and time.   Psychiatric:         Mood and Affect: Mood normal.         Behavior: Behavior normal.         Thought Content: Thought content normal.         Judgment: Judgment normal.         Vitals:    11/07/23 1043   BP: 119/68   Pulse: 88   Resp: 16   Temp: 97.1 °F (36.2 °C)   SpO2: 98%   Weight: 62.2 kg (137 lb 2 oz)   Height: 156.2 cm (61.5\")   PainSc: 0-No pain       Wt Readings from Last 3 Encounters:   11/07/23 62.2 kg (137 lb 2 oz)   07/06/23 60.8 kg (134 lb)   05/24/23 61.3 kg (135 lb 2.3 oz)                 ECOG score: 0         ECOG: (0) " "Fully Active - Able to Carry On All Pre-disease Performance Without Restriction  Fall Risk Assessment was completed, and patient is at low risk for falls.  PHQ-9 Total Score: 0       The patient is not  experiencing fatigue. Fatigue score: 0    PT/OT Functional Screening: PT fx screen : No needs identified  Speech Functional Screening: Speech fx screen : No needs identified  Rehab to be ordered: Rehab to be ordered : No needs identified        Result Review :  The following data was reviewed by: JASSI Nicholas on 11/07/2023:  Lab Results   Component Value Date    HGB 12.8 01/17/2023    HCT 36.5 01/17/2023    MCV 87.3 01/17/2023     01/17/2023    WBC 7.60 01/17/2023    NEUTROABS 4.16 01/17/2023    LYMPHSABS 2.86 01/17/2023    MONOSABS 0.37 01/17/2023    EOSABS 0.16 01/17/2023    BASOSABS 0.03 01/17/2023     Lab Results   Component Value Date    GLUCOSE 98 01/17/2023    BUN 15 01/17/2023    CREATININE 0.76 01/17/2023     01/17/2023    K 3.7 01/17/2023     01/17/2023    CO2 24.1 01/17/2023    CALCIUM 10.0 01/17/2023    PROTEINTOT 6.6 01/17/2023    ALBUMIN 4.3 01/17/2023    BILITOT 0.2 01/17/2023    ALKPHOS 87 01/17/2023    AST 14 01/17/2023    ALT 11 01/17/2023        No results found for: \"IRON\", \"LABIRON\", \"TRANSFERRIN\", \"TIBC\"  No results found for: \"LDH\", \"FERRITIN\", \"[id number]\", \"FOLATE\"  No results found for: \"PSA\", \"CEA\", \"AFP\", \"\", \"\"    No Images in the past 120 days found..         Assessment and Plan:  Diagnoses and all orders for this visit:    1. Tobacco abuse disorder (Primary)  -     buPROPion SR (Wellbutrin SR) 150 MG 12 hr tablet; Take 1 tablet by mouth 2 (Two) Times a Day. Take daily x 3 days, then increase to 1 tab BID.     Stop smoking after one week.  Dispense: 60 tablet; Refill: 4    2. Malignant neoplasm of lower-inner quadrant of left breast in female, estrogen receptor positive [C50.312, Z17.0]    We discussed she can follow up with her PCP for further " care since she has completed 5 years of follow up. We will leave her in a PRN status if she is having any issues.     We discussed smoking cessation options today. She is tried Chantix, Nicotine patch and gum previously. Would like to try Wellbutrin. This will be sent to her pharmacy to start and she will set a quit date for 1 week. She will start taking Wellbutrin 150 mg 1 tab QD x 3 days, then increase to BID dosing after this.     Call with any questions or concerns.           Patient Follow Up: PRN     Patient was given instructions and counseling regarding her condition or for health maintenance advice. Please see specific information pulled into the AVS if appropriate.     Salima Aguilar, APRN    11/7/2023    .tob

## 2023-12-21 ENCOUNTER — APPOINTMENT (OUTPATIENT)
Dept: GENERAL RADIOLOGY | Facility: HOSPITAL | Age: 48
End: 2023-12-21
Payer: COMMERCIAL

## 2023-12-21 ENCOUNTER — HOSPITAL ENCOUNTER (EMERGENCY)
Facility: HOSPITAL | Age: 48
Discharge: HOME | End: 2023-12-22
Attending: EMERGENCY MEDICINE
Payer: COMMERCIAL

## 2023-12-21 VITALS
OXYGEN SATURATION: 99 % | HEART RATE: 105 BPM | WEIGHT: 141.98 LBS | DIASTOLIC BLOOD PRESSURE: 84 MMHG | SYSTOLIC BLOOD PRESSURE: 102 MMHG | BODY MASS INDEX: 26.81 KG/M2 | RESPIRATION RATE: 18 BRPM | HEIGHT: 61 IN | TEMPERATURE: 98.5 F

## 2023-12-21 DIAGNOSIS — M79.604 RIGHT LEG PAIN: Primary | ICD-10-CM

## 2023-12-21 PROCEDURE — 99282 EMERGENCY DEPT VISIT SF MDM: CPT

## 2023-12-21 PROCEDURE — 73502 X-RAY EXAM HIP UNI 2-3 VIEWS: CPT

## 2023-12-22 RX ORDER — CYCLOBENZAPRINE HCL 10 MG
10 TABLET ORAL 3 TIMES DAILY PRN
Qty: 20 TABLET | Refills: 0 | Status: SHIPPED | OUTPATIENT
Start: 2023-12-22

## 2023-12-22 NOTE — ED TRIAGE NOTES
Sttod up off of commode, had pain from upper thigh down side of right leg about 7 pm that has gotten worse.  Took  mg, nerve cream, and soaked in epsom salt bath.  Difficult walking/bearing weight.  States it feels like a big knot in outer thigh when lays on side.  8/10 pain

## 2023-12-22 NOTE — ED PROVIDER NOTES
Time: 9:22 PM EST  Date of encounter:  12/21/2023  Independent Historian/Clinical History and Information was obtained by:   Patient    History is limited by: N/A    Chief Complaint   Patient presents with    Leg Pain         History of Present Illness:  Patient is a 48 y.o. year old female who presents to the emergency department for evaluation of right leg pain.  Patient states she went to stand up to get off the toilet and had sudden pain in the right lateral portion of her thigh that radiates down the leg.  Patient states the pain is worsened since that time and is worse with ambulation.  Patient denies any back pain.  Patient denies fall or injury.  Patient does state that she took 3 aspirin at home and tried rubbing muscle cream on the thigh.  (Provider in triage, Jaiden Gtz PA-C)    Patient is a 48-year-old female who presents with complaints of right leg pain.  States that she went to stand up to get up still had pain in her right lateral portion of her leg.  States it also radiates down her leg.  Does have a history of sciatica and back surgeries in the past but states this is little bit different.  States is worse with movement and touching it.  No other complaints at this time.  Denies any injury or falls.    Patient Care Team  Primary Care Provider: Maria Fernanda Hill MD    Past Medical History:     Allergies   Allergen Reactions    Azo [Phenazopyridine] Unknown (See Comments)     Unknown     Sulfa Antibiotics Unknown (See Comments)     Unknown      Past Medical History:   Diagnosis Date    Anxiety     Arthritis     Breast cancer     Claustrophobia     Cold sore     Colitis     Depression     Fatty liver     Night sweats     PONV (postoperative nausea and vomiting)     has had scope patch before    Sciatic leg pain     left side    Seasonal allergies     Sinus trouble     Sprain of ankle, right 01/25/2018    Tobacco use      Past Surgical History:   Procedure Laterality Date    ABDOMINOPLASTY   10/15/2019    AMPUTATION HAND Right     APPENDECTOMY      BREAST BIOPSY Bilateral     BREAST RECONSTRUCTION  2018    with tissue expander     FAT GRAFTING  1/23/19,6/4/19,10/15/1    an excess skin excision     HAND SURGERY Bilateral 2010    thumb amputation     HYSTERECTOMY      left one ovary    LUMBAR DISCECTOMY Left 12/23/2022    Procedure: MINIMALLY INVASIVE LUMBAR DISCECTOMY, left approach, lumbar 4-lumbar 5;  Surgeon: Randy Garcia MD;  Location: [facility] MAIN OR;  Service: Neurosurgery;  Laterality: Left;    MASTECTOMY Bilateral 2018    NIPPLE RECONSTRUCTION  06/04/2019    THYROID SURGERY      UPPER GASTROINTESTINAL ENDOSCOPY       Family History   Problem Relation Age of Onset    Prostate cancer Brother 38    Diabetes Brother     Breast cancer Maternal Aunt 55    Melanoma Maternal Aunt 45    Stroke Father     Diabetes Maternal Grandmother     Breast cancer Other         malignant    Melanoma Other     Diabetes Maternal Aunt        Home Medications:  Prior to Admission medications    Medication Sig Start Date End Date Taking? Authorizing Provider   ascorbic acid (VITAMIN C) 500 MG capsule controlled-release CR capsule Vitamin C 500 mg oral capsule, extended release take 1 capsule by oral route daily   Suspended    ProviderDamion MD   buPROPion SR (Wellbutrin SR) 150 MG 12 hr tablet Take 1 tablet by mouth 2 (Two) Times a Day. Take daily x 3 days, then increase to 1 tab BID.     Stop smoking after one week. 11/7/23   Salima Aguilar APRN   Calcium Carbonate (CALCIUM 600 PO) Take  by mouth.    Provider, MD Damion   Cholecalciferol 25 MCG (1000 UT) capsule Vitamin D3 1,000 unit oral capsule take 1 capsule by oral route daily   Active    Provider, MD Damion   methylcellulose (Citrucel) oral powder Take 2 application  by mouth Daily. 7/6/23   Charlee Willis APRN   Multiple Vitamins-Minerals (MULTIVITAMIN WITH MINERALS) tablet tablet Take 1 tablet by mouth Daily.    Provider,  "MD Damion   Omega-3 Fatty Acids (FISH OIL) 1000 MG capsule capsule Take  by mouth Daily With Breakfast.    ProviderDamion MD   omeprazole (priLOSEC) 20 MG capsule Take 1 capsule by mouth Daily. before a meal 7/6/23   Charlee Willis APRN   Probiotic Product (Florajen Digestion) capsule  12/13/21   ProviderDamion MD   vitamin E 100 UNIT capsule vitamin E 100 unit oral capsule take 1 capsule by oral route daily   Active    ProviderDamion MD        Social History:   Social History     Tobacco Use    Smoking status: Some Days     Packs/day: 0.25     Years: 25.00     Additional pack years: 0.00     Total pack years: 6.25     Types: Cigarettes    Smokeless tobacco: Never   Vaping Use    Vaping Use: Never used   Substance Use Topics    Alcohol use: Yes     Comment: socially    Drug use: No         Review of Systems:  Review of Systems   Musculoskeletal:  Positive for arthralgias.        Physical Exam:  /84   Pulse 105   Temp 98.5 °F (36.9 °C) (Oral)   Resp 18   Ht 154.9 cm (61\")   Wt 64.4 kg (141 lb 15.6 oz)   SpO2 99%   BMI 26.83 kg/m²         Physical Exam  Vitals and nursing note reviewed.   Constitutional:       Appearance: Normal appearance.   HENT:      Head: Normocephalic and atraumatic.   Cardiovascular:      Pulses: Normal pulses.   Pulmonary:      Effort: Pulmonary effort is normal.   Musculoskeletal:      Comments: Good strength bilaterally in the lower extremities.  There is tenderness palpation of the lateral side mainly in the musculoskeletal area.  There is no neurological deficits noted.  Negative straight leg raise.     Neurological:      General: No focal deficit present.      Mental Status: She is alert.                      Procedures:  Procedures      Medical Decision Making:      Comorbidities that affect care:    Cancer, Smoking    External Notes reviewed:  Reviewed oncology note from 11/7/2023        The following orders were placed and all results were " independently analyzed by me:  Orders Placed This Encounter   Procedures    XR Hip With or Without Pelvis 2 - 3 View Right       Medications Given in the Emergency Department:  Medications - No data to display     ED Course:    The patient was initially evaluated in the triage area where orders were placed. The patient was later dispositioned by Nate Lind MD.      The patient was advised to stay for completion of workup which includes but is not limited to communication of labs and radiological results, reassessment and plan. The patient was advised that leaving prior to disposition by a provider could result in critical findings that are not communicated to the patient.     ED Course as of 12/22/23 0025   u Dec 21, 2023   2123 PROVIDER IN TRIAGE  Patient was evaluated by me in triage, Jaiden Gtz PA-C.  Orders were placed and patient is currently awaiting final results and disposition.  [MD]      ED Course User Index  [MD] Jaiden Gtz PA-C       Labs:    Lab Results (last 24 hours)       ** No results found for the last 24 hours. **             Imaging:    XR Hip With or Without Pelvis 2 - 3 View Right    Result Date: 12/21/2023  PROCEDURE: XR HIP W OR WO PELVIS 2-3 VIEW RIGHT  COMPARISON: 8/29/2022.  INDICATIONS: Right leg pain; h/o breast ca.  FINDINGS: 3 views were obtained.  No acute fracture or acute malalignment is identified.  Degenerative and enthesopathic changes are present.  External artifacts obscure detail.  If symptoms or clinical concerns persist, consider imaging follow-up.       No acute fracture or acute malalignment is identified.     Please note that portions of this note were completed with a voice recognition program.  BASIM CHANG JR, MD       Electronically Signed and Approved By: BASIM CHANG JR, MD on 12/21/2023 at 22:42                 Differential Diagnosis and Discussion:      Extremity Pain: Differential diagnosis includes but is not limited to soft tissue  sprain, tendonitis, tendon injury, dislocation, fracture, deep vein thrombosis, arterial insufficiency, osteoarthritis, bursitis, and ligamentous damage.    All X-rays impressions were independently interpreted by me.    MDM     Amount and/or Complexity of Data Reviewed  Tests in the radiology section of CPT®: reviewed         Patient with right lower extremity pain at the right lateral thigh.  Appears to be mainly musculoskeletal in nature.  Worse with palpation as well as movement and stretching.  There is no focal deficits noted.  Recommend conservative therapy at this time with muscle relaxers and anti-inflammatories.  No signs of cauda equina.        Patient Care Considerations:          Consultants/Shared Management Plan:    None    Social Determinants of Health:    Patient is independent, reliable, and has access to care.       Disposition and Care Coordination:    Discharged: The patient is suitable and stable for discharge with no need for consideration of observation or admission.    I have explained the patient´s condition, diagnoses and treatment plan based on the information available to me at this time. I have answered questions and addressed any concerns. The patient has a good  understanding of the patient´s diagnosis, condition, and treatment plan as can be expected at this point. The vital signs have been stable. The patient´s condition is stable and appropriate for discharge from the emergency department.      The patient will pursue further outpatient evaluation with the primary care physician or other designated or consulting physician as outlined in the discharge instructions. They are agreeable to this plan of care and follow-up instructions have been explained in detail. The patient has received these instructions in written format and have expressed an understanding of the discharge instructions. The patient is aware that any significant change in condition or worsening of symptoms should  prompt an immediate return to this or the closest emergency department or call to 911.      Final diagnoses:   Right leg pain        ED Disposition       ED Disposition   Discharge    Condition   Stable    Comment   --               This medical record created using voice recognition software.             Nate Lind MD  12/22/23 0026

## 2024-03-05 ENCOUNTER — APPOINTMENT (OUTPATIENT)
Dept: OCCUPATIONAL THERAPY | Facility: HOSPITAL | Age: 49
End: 2024-03-05
Payer: MEDICARE

## 2024-04-08 ENCOUNTER — HOSPITAL ENCOUNTER (OUTPATIENT)
Dept: OCCUPATIONAL THERAPY | Facility: HOSPITAL | Age: 49
Setting detail: THERAPIES SERIES
Discharge: HOME | End: 2024-04-08
Payer: MEDICARE

## 2024-04-08 DIAGNOSIS — Z17.0 MALIGNANT NEOPLASM OF UPPER-OUTER QUADRANT OF RIGHT BREAST IN FEMALE, ESTROGEN RECEPTOR POSITIVE: ICD-10-CM

## 2024-04-08 DIAGNOSIS — C50.411 MALIGNANT NEOPLASM OF UPPER-OUTER QUADRANT OF RIGHT BREAST IN FEMALE, ESTROGEN RECEPTOR POSITIVE: ICD-10-CM

## 2024-04-08 DIAGNOSIS — Z91.89 AT RISK FOR LYMPHEDEMA: Primary | ICD-10-CM

## 2024-04-08 PROCEDURE — 97140 MANUAL THERAPY 1/> REGIONS: CPT | Performed by: OCCUPATIONAL THERAPIST

## 2024-04-08 PROCEDURE — 97535 SELF CARE MNGMENT TRAINING: CPT | Performed by: OCCUPATIONAL THERAPIST

## 2024-04-08 NOTE — THERAPY RE-EVALUATION
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, estrogen receptor positive    Anxiety    Arthritis    Breast cancer    Cold sore    Fatty liver    Colitis    Depression    Night sweats    Seasonal allergies    Sinus trouble    Sprain of ankle    Acute left-sided low back pain    Claustrophobia    Sprain of ankle, right    Tobacco use        Past Medical History:   Diagnosis Date    Anxiety     Arthritis     Breast cancer     Claustrophobia     Cold sore     Colitis     Depression     Fatty liver     Night sweats     PONV (postoperative nausea and vomiting)     has had scope patch before    Sciatic leg pain     left side    Seasonal allergies     Sinus trouble     Sprain of ankle, right 2018    Tobacco use         Past Surgical History:   Procedure Laterality Date    ABDOMINOPLASTY  10/15/2019    AMPUTATION HAND Right     APPENDECTOMY      BREAST BIOPSY Bilateral     BREAST RECONSTRUCTION  2018    with tissue expander     FAT GRAFTING  19,19,10/15/1    an excess skin excision     HAND SURGERY Bilateral 2010    thumb amputation     HYSTERECTOMY      left one ovary    LUMBAR DISCECTOMY Left 2022    Procedure: MINIMALLY INVASIVE LUMBAR DISCECTOMY, left approach, lumbar 4-lumbar 5;  Surgeon: Randy Garcia MD;  Location: [facility] MAIN OR;  Service: Neurosurgery;  Laterality: Left;    MASTECTOMY Bilateral 2018    NIPPLE RECONSTRUCTION  2019    THYROID SURGERY      UPPER GASTROINTESTINAL ENDOSCOPY           Visit Dx:     ICD-10-CM ICD-9-CM   1. At risk for lymphedema  Z91.89 V49.89   2. Malignant neoplasm of upper-outer quadrant of right breast in female, estrogen receptor positive  C50.411 174.4    Z17.0 V86.0        Patient History       Row Name  04/08/24 1000             History    Chief Complaint --  Swelling of left chest wall  -TD      Brief Description of Current Complaint Solange is 49 year old female with a diagnosis of left breast cancer.  [place] underwent a bilateral mastectomy with reconstruction in 8/2018.  Patient had 5 lymphnodes removed at this time.  -TD         Fall Risk Assessment    Any falls in the past year: Yes  -TD      Does patient have a fear of falling Yes (comment)  -TD         Services    Are you currently receiving Home Health services No  -TD      Do you plan to receive Home Health services in the near future No  -TD         Daily Activities    Primary Language English  -TD      Are you able to read Yes  -TD      Are you able to write Yes  -TD      How does patient learn best? Listening;Reading;Pictures/Video;Demonstration  -TD      Pt Participated in POC and Goals Yes  -TD         Safety    Are you being hurt, hit, or frightened by anyone at home or in your life? No  -TD      Are you being neglected by a caregiver No  -TD      Have you had any of the following issues with N/A  -TD                User Key  (r) = Recorded By, (t) = Taken By, (c) = Cosigned By      Initials Name Provider Type    TD Alina Del Cid OT Occupational Therapist                     Lymphedema       Row Name 04/08/24 1000             Subjective Pain    Able to rate subjective pain? yes  -TD      Pre-Treatment Pain Level 0  -TD      Post-Treatment Pain Level 0  -TD         Subjective    Subjective Comments Pt has cord present this date.  -TD         Lymphedema Assessment    Lymphedema Classification LUE:;at risk/stage 0  -TD      Lymphedema Cancer Related Sx bilateral;simple mastectomy;reconstructive;sentinel node biopsy  -TD      Lymphedema Surgery Comments 8/2018  -TD      Lymph Nodes Removed # 5  -TD      Chemo Received no  -TD      Radiation Therapy Received no  -TD         LLIS - Physical Concerns    The amount of pain associated with my lymphedema is: 0   "-TD      The amount of limb heaviness associated with my lymphedema is: 0  -TD      The amount of skin tightness associated with my lymphedema is: 0  -TD      The size of my swollen limb(s) seems: 0  -TD      Lymphedema affects the movement of my swollen limb(s): 0  -TD      The strength in my swollen limb(s) is: 0  -TD         LLIS - Psychosocial Concerns    Lymphedema affects my body image (i.e., \"how I think I look\"). 0  -TD      Lymphedema affects my socializing with others. 0  -TD      Lymphedema affects my intimate relations with spouse or partner (rate 0 if not applicable 0  -TD      Lymphedema \"gets me down\" (i.e., depression, frustration, or anger) 0  -TD      I must rely on others for help due to my lymphedema. 0  -TD      I know what to do to manage my lymphedema 2  -TD         LLIS - Functional Concerns    Lymphedema affects my ability to perform self-care activities (i.e. eating, dressing, hygiene) 0  -TD      Lymphedema affects my ability to perform routine home or work-related activities. 0  -TD      Lymphedema affects my performance of preferred leisure activities. 0  -TD      Lymphedema affects proper fit of clothing/shoes 0  -TD      Lymphedema affects my sleep 0  -TD         Posture/Observations    Posture- WNL Posture is WNL  -TD         General ROM    GENERAL ROM COMMENTS Both upper extremities are within functional limits  -TD         MMT (Manual Muscle Testing)    General MMT Comments Both upper extremities are within functional limits  -TD         Skin Changes/Observations    Location/Assessment Upper Quadrant  -TD      Upper Quadrant Conditions bilateral:;intact;clean  Lymphatic cord noted in the left axilla.  Therapist was able to access and break the cord.  -TD      Skin Observations Comment Patient was noted to have a cord in the left axilla this date.  Therapist was able to access and treat cord this date.  Patient was given stretching exercises and nerve glides after.  -TD         " Lymphedema Life Impact Scale Totals    A.  Total Q1 - Q17 (Do not include Q18) 2  -TD      B.  Total number of questions answered (Q1-Q17) 17  -TD      C. Divide A by B 0.12  -TD      D. Multiple C by 25 3  -TD                User Key  (r) = Recorded By, (t) = Taken By, (c) = Cosigned By      Initials Name Provider Type    TD Alina Del Cid, OT Occupational Therapist                            Therapy Education  Education Details: Therapist reviewed stretches and nerve glides this date  Given: HEP, Symptoms/condition management  Program: Reinforced  How Provided: Verbal  Provided to: Patient  Level of Understanding: Teach back education performed  70936 - OT Self Care/Mgmt Minutes: 15      Manual Rx (Last 36 Hours)       Manual Treatments       Row Name 04/08/24 1125             Total Minutes    39903 - OT Manual Therapy Minutes 10  -TD                User Key  (r) = Recorded By, (t) = Taken By, (c) = Cosigned By      Initials Name Provider Type    TD Alina Del Cid, OT Occupational Therapist                   OT Goals       Row Name 04/08/24 1125          Time Calculation    OT Goal Re-Cert Due Date 05/08/24  -TD               User Key  (r) = Recorded By, (t) = Taken By, (c) = Cosigned By      Initials Name Provider Type    TD Alina Del Cid, YOVANI Occupational Therapist                UQLymphgoals:   1. Uncontrolled lymphedema of Left upper extremity/upper quadrant.  LTG1:  90 days:  Volumetric measurements of left upper extremity/upper quadrant will be decreased by 5 % or until plateau in reduction is achieved to improve functional mobility.             STATUS:  on going              STG1a:  30 days:  Volumetric measurements of left upper extremity/upper quadrant will be decreased by 3 % bilaterally to improve functional mobility.    STATUS:  on going  TREATMENT:  Manual Therapy, Therapeutic exercise, ADL/self-care therapy, Bioimpedence Fluid Analysis, Orthotic management and training, Therapeutic Activity, wound  dressing as needed, Modalities: TENS, NMES, Ultrasound, Fluidotherapy, and Patient and family/caregiver education.     2. Patient with decreased ADL/Self-Care routine for lymphedema management.              LTG 2:  90 days:  Patient/caregiver will independently verbalize/demonstrate ADL/Self-Care routine for lymphedema management.                      STATUS:  on going              STG 2a:  30 days:  Patient/caregiver will independently perform or verbally dictate compression wrapping technique of the upper extremity/upper quadrant.                      STATUS:  on going              STG2b:  30 days:  Patient will independently verbalize signs and symptoms of infection.                            STATUS:  on going              STG2c:  30 days:  Patient will independently demonstrate/verbalize proper skin care routine to prevent infection and or wound development.                       STATUS:  on going  STG2d:  30 days:  Patient will independently perform teach back of HEP routine.                      STATUS: on going  STG2e:  30 days:  Patient/caregiver will obtain properly fitting compression orthosis, will demonstrate don/doff skill of compression orthosis with modified independence, and independently verbalize wear schedule.          STATUS: on going  STG2f: 30 days: Patient/caregiver will independently perform self-manual lymphatic drainage of the upper extremity/upper quadrant.          STATUS: on going  TREATMENT:  Therapeutic Activity, Patient/Caregiver Education, ADL retraining, Manual Therapy, Orthotic Management and training, Modalities: TENS, NMES, Ultrasound, Fluidotherapy Wound dressing if necessary, Therapeutic Exercise, Modalities     3. Self-Care Limitations                                    LTG 3: 90 days:  The patient will demonstrate improved quality of life by achieving a score of 17 on the Lymphedema Life Impact Scale.                      STATUS:  on going              STG 3a: 30 days:  The  patient will demonstrate improved quality of life by achieving a score of 20 on the Lymphedema Life Impact Scale.                      STATUS:  on going  TREATMENT:  Manual therapy, therapeutic exercise, therapeutic activity, ADL retraining, Patient/caregiver education, Modalities: TENS, NMES, Ultrasound, Fluidotherapy, Orthotic Management and Training, wound dressing as needed.     OT Assessment/Plan       Row Name 04/08/24 1023          OT Assessment    Functional Limitations Limitations in functional capacity and performance  -TD     Impairments Impaired lymphatic circulation;Range of motion  -TD     Assessment Comments Solange is 49 year old female with a diagnosis of left breast cancer. Solange underwent a bilateral mastectomy with reconstruction in 8/2018. Patient had 5 lymphnodes removed at this time. Pt reports that she feels much better with a little tightness at end ranges. Cords were present this date. Therapist was able to access and treat cords this date. Patient would benefit from continued skilled OT to prevent increased staging of lymphedema, decreased AROM, and increased pain.  -TD     OT Diagnosis at risk for lymphedema  -TD     OT Rehab Potential Good  -TD     Patient/caregiver participated in establishment of treatment plan and goals Yes  -TD     Patient would benefit from skilled therapy intervention Yes  -TD        OT Plan    OT Frequency --  see duration  -TD     Predicted Duration of Therapy Intervention (OT) Pt will be seen every 6 months  -TD     Planned CPT's? OT EVAL LOW COMPLEXITY: 56238;OT RE-EVAL: 11208;OT THER ACT EA 15 MIN: 42928XR;OT THER PROC EA 15 MIN: 54373FB;OT SELF CARE/MGMT/TRAIN 15 MIN: 20488;OT MANUAL THERAPY EA 15 MIN: 00236;OT ORTHOTIC MGMT/TRAIN EA 15 MIN: 88392;OT ORTHO/PROSTHET CHECKOUT EA 15 MIN: 37942;OT CARE PLAN EA 15 MIN  -TD     Planned Therapy Interventions (Optional Details) manual therapy techniques;stretching;strengthening;ROM (Range of Motion);prosthetic  fitting/training;patient/family education;orthotic fitting/training  -TD     OT Plan Comments continue POC  -TD               User Key  (r) = Recorded By, (t) = Taken By, (c) = Cosigned By      Initials Name Provider Type    Alina Rhodes OT Occupational Therapist                              Time Calculation:   Timed Charges  14201 - OT Manual Therapy Minutes: 10  65694 - OT Self Care/Mgmt Minutes: 15  Total Minutes  Timed Charges Total Minutes: 25   Total Minutes: 25     Therapy Charges for Today       Code Description Service Date Service Provider Modifiers Qty    69747315578 HC OT MANUAL THERAPY EA 15 MIN 4/8/2024 Alina Del Cid OT GO 1    07205575445 HC OT SELF CARE/MGMT/TRAIN EA 15 MIN 4/8/2024 Alina Del Cid OT GO 1                      Alina Del Cid OT  4/8/2024

## 2024-09-18 ENCOUNTER — OFFICE VISIT (OUTPATIENT)
Dept: ORTHOPEDIC SURGERY | Facility: CLINIC | Age: 49
End: 2024-09-18
Payer: OTHER MISCELLANEOUS

## 2024-09-18 VITALS
HEIGHT: 61 IN | HEART RATE: 82 BPM | DIASTOLIC BLOOD PRESSURE: 70 MMHG | OXYGEN SATURATION: 98 % | WEIGHT: 140 LBS | SYSTOLIC BLOOD PRESSURE: 102 MMHG | BODY MASS INDEX: 26.43 KG/M2

## 2024-09-18 DIAGNOSIS — M75.82 ROTATOR CUFF TENDONITIS, LEFT: ICD-10-CM

## 2024-09-18 DIAGNOSIS — M25.512 LEFT SHOULDER PAIN, UNSPECIFIED CHRONICITY: Primary | ICD-10-CM

## 2024-09-18 RX ORDER — TRIAMCINOLONE ACETONIDE 40 MG/ML
40 INJECTION, SUSPENSION INTRA-ARTICULAR; INTRAMUSCULAR
Status: COMPLETED | OUTPATIENT
Start: 2024-09-18 | End: 2024-09-18

## 2024-09-18 RX ORDER — LIDOCAINE HYDROCHLORIDE 10 MG/ML
5 INJECTION, SOLUTION INFILTRATION; PERINEURAL
Status: COMPLETED | OUTPATIENT
Start: 2024-09-18 | End: 2024-09-18

## 2024-09-18 RX ADMIN — LIDOCAINE HYDROCHLORIDE 5 ML: 10 INJECTION, SOLUTION INFILTRATION; PERINEURAL at 09:35

## 2024-09-18 RX ADMIN — TRIAMCINOLONE ACETONIDE 40 MG: 40 INJECTION, SUSPENSION INTRA-ARTICULAR; INTRAMUSCULAR at 09:35

## 2024-10-30 ENCOUNTER — OFFICE VISIT (OUTPATIENT)
Dept: ORTHOPEDIC SURGERY | Facility: CLINIC | Age: 49
End: 2024-10-30
Payer: OTHER MISCELLANEOUS

## 2024-10-30 VITALS
BODY MASS INDEX: 26.43 KG/M2 | DIASTOLIC BLOOD PRESSURE: 67 MMHG | HEART RATE: 74 BPM | OXYGEN SATURATION: 95 % | WEIGHT: 140 LBS | SYSTOLIC BLOOD PRESSURE: 94 MMHG | HEIGHT: 61 IN

## 2024-10-30 DIAGNOSIS — M25.512 LEFT SHOULDER PAIN, UNSPECIFIED CHRONICITY: Primary | ICD-10-CM

## 2024-10-30 DIAGNOSIS — M75.82 ROTATOR CUFF TENDONITIS, LEFT: ICD-10-CM

## 2024-10-30 NOTE — PROGRESS NOTES
"Chief Complaint  Follow-up of the Left Shoulder    Subjective      Solange Srivastava presents to [facility] for follow up of her left shoulder.  Patient was last seen by  Been in office on 9/18/2024 for left shoulder pain and rotator cuff tendinitis.  This is a workers comp injury.  Patient reports pain goes in the shoulder and down the arm.  She states that sometimes the pain goes up into her neck.  She has decreased range of motion and pain with forward and upward range of motion.  Pain has been present for several months now.  Patient received a left shoulder steroid injection during her last office visit.  Patient reports that shot feels like it made her pain worse.  She states that the pain radiates up into her neck and down to her fingers.  She states she also experiences some intermittent numbness that radiates down to her fingers.  She does not know of any specific injury that could have triggered this but she feels like it is progressively getting worse.  She does her home exercises and takes anti-inflammatories as needed.  She is here today requesting an MRI of the left shoulder for further evaluation.    Allergies   Allergen Reactions    Azo [Phenazopyridine] Unknown (See Comments)     Unknown     Sulfa Antibiotics Unknown (See Comments)     Unknown        Objective     Vital Signs:   Vitals:    10/30/24 0904   BP: 94/67   Pulse: 74   SpO2: 95%   Weight: 63.5 kg (140 lb)   Height: 154.9 cm (60.98\")     Body mass index is 26.47 kg/m².    I reviewed the patient's chief complaint, history of present illness, review of systems, past medical history, surgical history, family history, social history, medications, and allergy list.     REVIEW OF SYSTEMS    Constitutional: Denies fevers, chills, weight loss  Cardiovascular: Denies chest pain, shortness of breath  Skin: Denies rashes, acute skin changes  Neurologic: Denies headache, loss of consciousness  MSK: Left shoulder pain. " "    Ortho Exam  Shoulder   General: Alert. No acute distress.  Left upper Extremity: 180 degrees active elevation with significant discomfort beginning at 120 degrees. External rotation to 35 degrees. Internal rotation to back pocket.   Demonstrates intact active wrist ROM. Sensation intact. Palpable radial pulse. Neurovascularly intact.            Imaging Results (Most Recent)       None                Assessment and Plan   Diagnoses and all orders for this visit:    1. Left shoulder pain, unspecified chronicity (Primary)  -     MRI Shoulder Left Without Contrast; Future    2. Rotator cuff tendonitis, left  -     MRI Shoulder Left Without Contrast; Future         Solange Srivastava presents to [facility] for her left shoulder.  Patient has left shoulder pain and tendinitis that she has been trying to treat conservatively.  Patient received a steroid injection which did not provide any relief.  Patient has had continued pain\" that it is slightly worsening.  Patient has done her home exercise program and also taking anti-inflammatories without significant improvement.  Left shoulder MRI was ordered today to assess the structure.    Patient will follow-up after MRI is completed for treatment plan options.          Follow Up   Return for After MRI.  There are no Patient Instructions on file for this visit.  Patient was given instructions and counseling regarding her condition or for health maintenance advice. Please see specific information pulled into the AVS if appropriate.       Dictated Utilizing Dragon Dictation. Please note that portions of this note were completed with a voice recognition program. Part of this note may be an electronic transcription/translation of spoken language to printed text using the Dragon Dictation System.                                          "

## 2024-11-18 ENCOUNTER — HOSPITAL ENCOUNTER (OUTPATIENT)
Dept: MRI IMAGING | Facility: HOSPITAL | Age: 49
Discharge: HOME | End: 2024-11-18
Payer: MEDICARE

## 2024-11-18 DIAGNOSIS — M25.512 LEFT SHOULDER PAIN, UNSPECIFIED CHRONICITY: ICD-10-CM

## 2024-11-18 DIAGNOSIS — M75.82 ROTATOR CUFF TENDONITIS, LEFT: ICD-10-CM

## 2024-11-18 PROCEDURE — 73221 MRI JOINT UPR EXTREM W/O DYE: CPT

## 2024-11-25 ENCOUNTER — OFFICE VISIT (OUTPATIENT)
Dept: ORTHOPEDIC SURGERY | Facility: CLINIC | Age: 49
End: 2024-11-25
Payer: OTHER MISCELLANEOUS

## 2024-11-25 ENCOUNTER — HOSPITAL ENCOUNTER (OUTPATIENT)
Facility: HOSPITAL | Age: 49
Setting detail: THERAPIES SERIES
Discharge: HOME | End: 2024-11-25
Payer: COMMERCIAL

## 2024-11-25 VITALS
DIASTOLIC BLOOD PRESSURE: 63 MMHG | OXYGEN SATURATION: 96 % | SYSTOLIC BLOOD PRESSURE: 105 MMHG | WEIGHT: 140 LBS | HEART RATE: 80 BPM | HEIGHT: 62 IN | BODY MASS INDEX: 25.76 KG/M2

## 2024-11-25 DIAGNOSIS — Z91.89 AT RISK FOR LYMPHEDEMA: Primary | ICD-10-CM

## 2024-11-25 DIAGNOSIS — M75.82 ROTATOR CUFF TENDONITIS, LEFT: ICD-10-CM

## 2024-11-25 DIAGNOSIS — M25.512 LEFT SHOULDER PAIN, UNSPECIFIED CHRONICITY: ICD-10-CM

## 2024-11-25 DIAGNOSIS — Z17.0 MALIGNANT NEOPLASM OF UPPER-OUTER QUADRANT OF LEFT BREAST IN FEMALE, ESTROGEN RECEPTOR POSITIVE: ICD-10-CM

## 2024-11-25 DIAGNOSIS — C50.412 MALIGNANT NEOPLASM OF UPPER-OUTER QUADRANT OF LEFT BREAST IN FEMALE, ESTROGEN RECEPTOR POSITIVE: ICD-10-CM

## 2024-11-25 DIAGNOSIS — S43.432A SUPERIOR GLENOID LABRUM LESION OF LEFT SHOULDER, INITIAL ENCOUNTER: Primary | ICD-10-CM

## 2024-11-25 DIAGNOSIS — M54.2 CERVICAL PAIN (NECK): ICD-10-CM

## 2024-11-25 PROCEDURE — 97535 SELF CARE MNGMENT TRAINING: CPT | Performed by: OCCUPATIONAL THERAPIST

## 2024-11-25 RX ORDER — TRIAMCINOLONE ACETONIDE 40 MG/ML
40 INJECTION, SUSPENSION INTRA-ARTICULAR; INTRAMUSCULAR
Status: COMPLETED | OUTPATIENT
Start: 2024-11-25 | End: 2024-11-25

## 2024-11-25 RX ORDER — LIDOCAINE HYDROCHLORIDE 10 MG/ML
5 INJECTION, SOLUTION INFILTRATION; PERINEURAL
Status: COMPLETED | OUTPATIENT
Start: 2024-11-25 | End: 2024-11-25

## 2024-11-25 RX ADMIN — TRIAMCINOLONE ACETONIDE 40 MG: 40 INJECTION, SUSPENSION INTRA-ARTICULAR; INTRAMUSCULAR at 12:18

## 2024-11-25 RX ADMIN — TRIAMCINOLONE ACETONIDE 40 MG: 40 INJECTION, SUSPENSION INTRA-ARTICULAR; INTRAMUSCULAR at 11:35

## 2024-11-25 RX ADMIN — LIDOCAINE HYDROCHLORIDE 5 ML: 10 INJECTION, SOLUTION INFILTRATION; PERINEURAL at 12:18

## 2024-11-25 NOTE — PROGRESS NOTES
"Chief Complaint  Follow-up and Pain of the Left Shoulder    Subjective      Solange Srivastava presents to [facility] for follow up of her left shoulder.  This is a workers comp injury.  During her last office visit on 10/30/2024 patient had MRI of the left shoulder that was ordered and she is here today to review.  To reflect, patient has been treating her left shoulder pain rotator cuff tendinitis with intermittent injections.  Patient received a left shoulder steroid injection prior and reports that the injection made her pain worse.  Patient has pain that radiates up and down her arm and into her neck.  She does report sometimes she experiences intermittent numbness that radiates down to her fingers.  Patient has been taking anti-inflammatories, utilizing her home exercise program and has tried steroid injections without significant relief.     Allergies   Allergen Reactions    Azo [Phenazopyridine] Unknown (See Comments)     Unknown     Sulfa Antibiotics Unknown (See Comments)     Unknown        Objective     Vital Signs:   Vitals:    11/25/24 0835   BP: 105/63   Pulse: 80   SpO2: 96%   Weight: 63.5 kg (140 lb)   Height: 156.2 cm (61.5\")   PainSc:   4     Body mass index is 26.02 kg/m².    I reviewed the patient's chief complaint, history of present illness, review of systems, past medical history, surgical history, family history, social history, medications, and allergy list.     Ortho Exam  General: Alert. No acute distress.  Left upper Extremity: 180 degrees active elevation. External rotation to 45 degrees. Internal rotation to mid thoracic.  90 degrees pronation and supination. Demonstrates intact active elbow ROM. Demonstrates intact active wrist ROM. Sensation intact. Palpable radial pulse. Neurovascularly intact.      Large Joint Arthrocentesis: L glenohumeral  Date/Time: 11/25/2024 11:35 AM  Consent given by: patient  Site marked: site marked  Timeout: Immediately prior to " procedure a time out was called to verify the correct patient, procedure, equipment, support staff and site/side marked as required   Supporting Documentation  Indications: pain   Procedure Details  Location: shoulder - L glenohumeral  Preparation: Patient was prepped and draped in the usual sterile fashion  Needle gauge: 21G.  Medications administered: 40 mg triamcinolone acetonide 40 MG/ML  Patient tolerance: patient tolerated the procedure well with no immediate complications         MRI Shoulder Left Without Contrast  Narrative: MRI SHOULDER LEFT WO CONTRAST    Date of Exam: 11/18/2024 7:48 AM EST    Indication: left shoulder pain, left shoulder decreased range of motion.     Comparison: Radiograph September 18, 2024    Technique:  Routine multiplanar/multisequence images of the left shoulder were obtained without contrast administration.      Findings:  Rotator cuff:  There is thickening and intermediate signal of the distal supraspinatus and to a lesser extent the distal infraspinatus consistent with tendinopathy. There is enthesopathy at the anterior greater tuberosity with small subcortical cyst and subcortical   edema.    At the posterior supraspinatus insertion there appears to be a small fluid signal defect (primarily within the intrasubstance tendon on coronal image 12 of series 301 and sagittal series 501 image 4). This does appear to extend to the bursal surface at   the anterior aspect on coronal image 13. Overall the finding measures approximately 7 mm in anterior to posterior dimensions. There is possible additional shallow articular surface defect of the distal mid supraspinatus on coronal image 12. No definite   full-thickness tendon defect.    There is subscapularis tendinopathy. There is suspected intrasubstance defect at the superior subscapularis tendon insertion with enthesopathy at the lesser tuberosity. Teres minor appears intact. No significant muscle edema or atrophy.    Glenohumeral  joint:  There is slight superior and posterior subluxation of the numeral head. No significant joint effusion. No findings of significant glenohumeral arthropathy.    There is increased signal in the superior labrum on coronal images suspicious for tear extending anterior to posterior. No paralabral cyst is seen.    Biceps tendon:  There is tendinopathy of the biceps tendon. There is suspicion for partial tear of the distal intra-articular tendon extending into the upper bicipital groove on axial images 14-17. Small amount of fluid in the tendon sheath appears disproportionate to   joint fluid suspicious for tenosynovitis. The tendon appears normally positioned.    Acromioclavicular Joint:  Mild degenerative changes at the acromioclavicular joint. No significant periarticular edema signal. Alignment appears within normal limits.    Bone:  Mild enthesopathy at the greater and lesser tuberosities. No definite fracture. No other suspicious marrow signal abnormality.    Miscellaneous:  Trace fluid in the subacromial/subdeltoid bursa. No significant deltoid muscle atrophy or edema. No pathologically enlarged axillary lymph nodes.  Impression: Impression:  1.Rotator cuff tendinopathy with suspected small intrasubstance and bursal surface tear at the posterior supraspinatus insertion and possible shallow articular surface defect of the distal mid supraspinatus.  2.Subscapularis tendinopathy and suspected intrasubstance defect at the superior insertion. No definite full-thickness rotator cuff tendon defect.  3.Tendinopathy and partial tear of the long head of the biceps tendon with suspected tenosynovitis.  4.Findings suggestive of tear of the superior labrum.   5.Mild acromioclavicular joint degeneration.    Electronically Signed: Nate Fuller    11/20/2024 9:20 AM EST    Workstation ID: [id number]          Assessment and Plan   Diagnoses and all orders for this visit:    1. Superior glenoid labrum lesion of left shoulder,  initial encounter (Primary)  -     Ambulatory Referral to Physical Therapy for Evaluation & Treatment  -     Large Joint Arthrocentesis: L glenohumeral  -     lidocaine (XYLOCAINE) 1 % injection 5 mL  -     triamcinolone acetonide (KENALOG-40) injection 40 mg    2. Left shoulder pain, unspecified chronicity  -     Ambulatory Referral to Physical Therapy for Evaluation & Treatment    3. Rotator cuff tendonitis, left  -     Ambulatory Referral to Physical Therapy for Evaluation & Treatment    4. Cervical pain (neck)    Other orders  -     Cancel: Large Joint Arthrocentesis  -     Large Joint Arthrocentesis: L glenohumeral       Solange Srivastava presents to [facility] for her left shoulder.  MRI reviewed.     We discussed further conservative management for their left shoulder pain/rotator cuff tendinitis. This includes but is not limited to - oral NSAIDs, topical NSAIDs, PT/home exercise program, intermittent steroid injections. Continue home exercises provided/reviewed at previous visit. Discussed NSAID use, precautions, and recommendations on taking.  Encouraged patient to attend formal physical therapy, an order placed today.      Patient would like to begin taking Ibuprofen scheduled rather than PRN as she has been, as well as Tumeric before trialing any prescription NSAIDs.  Advised not to take with additional NSAIDs (aleve, naproxen, etc) and to take with food. Patient voiced no known CKD, anti-platelet or anticoagulant use, or GI bleed/ulcer history.      Patient elected to proceed with repeat left shoulder steroid injection today in office. Patient is previously aware of risks, benefits and alternatives of injections. Patient was informed of possible adverse effects including but not limited to bleeding, damage to nerve, tendon or artery, increased blood sugar and increased blood pressure. Discussed possibility of a reaction from the injection.  Discussed the possibility that the  injection may not completely improve or remove the pain.  Discussed the risk of infection.  Discussed the possibility of worsening pain after the injection.  Informed consent obtained.  Time out was performed. Chlorhexidine was swabbed at injection site per typical technique. Needle injected into bursa, aspiration was performed and then left shoulder steroid slowly injected into joint space, fluid was free flowing. Needle was removed, band-aid placed to injection site.  Patient tolerated injection well with no complications.      Begin outpatient PT. Will have PT work on the neck some too.      Follow-up in 6 weeks. No x-rays needed. Will determine if need for neck imaging at that time.       Tobacco Use: Medium Risk (11/25/2024)    Patient History     Smoking Tobacco Use: Former     Smokeless Tobacco Use: Never     Passive Exposure: Not on file     Patient reports they have a history of tobacco use; encouraged continued tobacco cessation for further health benefits.            Follow Up   Return in about 6 weeks (around 1/6/2025).  There are no Patient Instructions on file for this visit.    Patient was given instructions and counseling regarding her condition or for health maintenance advice. Please see specific information pulled into the AVS if appropriate.       Dictated Utilizing Dragon Dictation. Please note that portions of this note were completed with a voice recognition program. Part of this note may be an electronic transcription/translation of spoken language to printed text using the Dragon Dictation System.

## 2024-11-25 NOTE — THERAPY RE-EVALUATION
Outpatient Occupational Therapy Lymphedema Re-Evaluation   [place]     Patient Name: Solange Srivastava  : 1975  MRN: [id number]  Today's Date: 2024      Visit Date: 2024    Patient Active Problem List   Diagnosis    Malignant neoplasm of lower-inner quadrant of left breast in female, estrogen receptor positive    Anxiety    Arthritis    Breast cancer    Cold sore    Fatty liver    Colitis    Depression    Night sweats    Seasonal allergies    Sinus trouble    Sprain of ankle    Acute left-sided low back pain    Claustrophobia    Sprain of ankle, right    Tobacco use        Past Medical History:   Diagnosis Date    Anxiety     Arthritis     Breast cancer     Claustrophobia     Cold sore     Colitis     Depression     Fatty liver     Night sweats     PONV (postoperative nausea and vomiting)     has had scope patch before    Sciatic leg pain     left side    Seasonal allergies     Sinus trouble     Sprain of ankle, right 2018    Tobacco use         Past Surgical History:   Procedure Laterality Date    ABDOMINOPLASTY  10/15/2019    AMPUTATION HAND Right     APPENDECTOMY      BREAST BIOPSY Bilateral     BREAST RECONSTRUCTION  2018    with tissue expander     FAT GRAFTING  19,19,10/15/1    an excess skin excision     HAND SURGERY Bilateral 2010    thumb amputation     HYSTERECTOMY      left one ovary    LUMBAR DISCECTOMY Left 2022    Procedure: MINIMALLY INVASIVE LUMBAR DISCECTOMY, left approach, lumbar 4-lumbar 5;  Surgeon: Randy Garcia MD;  Location: [facility] MAIN OR;  Service: Neurosurgery;  Laterality: Left;    MASTECTOMY Bilateral 2018    NIPPLE RECONSTRUCTION  2019    THYROID SURGERY      UPPER GASTROINTESTINAL ENDOSCOPY           Visit Dx:     ICD-10-CM ICD-9-CM   1. At risk for lymphedema  Z91.89 V49.89   2. Malignant neoplasm of upper-outer quadrant of left breast in female, estrogen receptor positive  C50.412 174.4    Z17.0 V86.0        Patient History       Row Name  11/25/24 0800             History    Chief Complaint --  Swelling of left chest wall  -TD      Brief Description of Current Complaint Solange is 49 year old female with a diagnosis of left breast cancer.  Solange underwent a bilateral mastectomy with reconstruction in 8/2018.  Patient had 5 lymphnodes removed at this time.  -TD         Fall Risk Assessment    Any falls in the past year: Yes  -TD      Does patient have a fear of falling Yes (comment)  -TD         Services    Are you currently receiving Home Health services No  -TD      Do you plan to receive Home Health services in the near future No  -TD         Daily Activities    Primary Language English  -TD      Are you able to read Yes  -TD      Are you able to write Yes  -TD      How does patient learn best? Listening;Reading;Pictures/Video;Demonstration  -TD      Pt Participated in POC and Goals Yes  -TD         Safety    Are you being hurt, hit, or frightened by anyone at home or in your life? No  -TD      Are you being neglected by a caregiver No  -TD      Have you had any of the following issues with N/A  -TD                User Key  (r) = Recorded By, (t) = Taken By, (c) = Cosigned By      Initials Name Provider Type    TD Alina Del Cid OT Occupational Therapist                     Lymphedema       Row Name 11/25/24 0838             Subjective Pain    Able to rate subjective pain? yes  -TD      Pre-Treatment Pain Level 8  -TD      Post-Treatment Pain Level 8  -TD      Subjective Pain Comment in left shoulder  -TD         Subjective    Subjective Comments Pt has had shot in theleft shoulder due to tear  -TD         Lymphedema Assessment    Lymphedema Classification LUE:;at risk/stage 0  -TD      Lymphedema Cancer Related Sx bilateral;simple mastectomy;reconstructive;sentinel node biopsy  -TD      Lymphedema Surgery Comments 8/2018  -TD      Lymph Nodes Removed # 5  -TD      Chemo Received no  -TD      Radiation Therapy Received no  -TD         LLIS - Physical  "Concerns    The amount of pain associated with my lymphedema is: 0  -TD      The amount of limb heaviness associated with my lymphedema is: 0  -TD      The amount of skin tightness associated with my lymphedema is: 0  -TD      The size of my swollen limb(s) seems: 0  -TD      Lymphedema affects the movement of my swollen limb(s): 0  -TD      The strength in my swollen limb(s) is: 0  -TD         LLIS - Psychosocial Concerns    Lymphedema affects my body image (i.e., \"how I think I look\"). 0  -TD      Lymphedema affects my socializing with others. 0  -TD      Lymphedema affects my intimate relations with spouse or partner (rate 0 if not applicable 0  -TD      Lymphedema \"gets me down\" (i.e., depression, frustration, or anger) 0  -TD      I must rely on others for help due to my lymphedema. 0  -TD      I know what to do to manage my lymphedema 2  -TD         LLIS - Functional Concerns    Lymphedema affects my ability to perform self-care activities (i.e. eating, dressing, hygiene) 0  -TD      Lymphedema affects my ability to perform routine home or work-related activities. 0  -TD      Lymphedema affects my performance of preferred leisure activities. 0  -TD      Lymphedema affects proper fit of clothing/shoes 0  -TD      Lymphedema affects my sleep 0  -TD         Posture/Observations    Posture- WNL Posture is WNL  -TD         General ROM    GENERAL ROM COMMENTS BUE are WFL  -TD         MMT (Manual Muscle Testing)    General MMT Comments BUE are WFL  -TD         Skin Changes/Observations    Location/Assessment Upper Quadrant  -TD      Upper Quadrant Conditions bilateral:;intact;clean  -TD      Skin Observations Comment Pt did not have a cord presnt at this time.  Pt has no complaints of lymphatic cords  -TD         Lymphedema Life Impact Scale Totals    A.  Total Q1 - Q17 (Do not include Q18) 2  -TD      B.  Total number of questions answered (Q1-Q17) 17  -TD      C. Divide A by B 0.12  -TD      D. Multiple C by 25 3  " -TD                User Key  (r) = Recorded By, (t) = Taken By, (c) = Cosigned By      Initials Name Provider Type    TD Alina Del Cid, YOVANI Occupational Therapist                            Therapy Education  Education Details: Therapist educated pt on signs of lymphedma and cording  Given: HEP, Symptoms/condition management  Program: Reinforced  How Provided: Verbal  Provided to: Patient  Level of Understanding: Teach back education performed  62292 - OT Self Care/Mgmt Minutes: 15         OT Goals       Row Name 11/25/24 1019          Time Calculation    OT Goal Re-Cert Due Date 12/25/24  -TD               User Key  (r) = Recorded By, (t) = Taken By, (c) = Cosigned By      Initials Name Provider Type    TD Alina Del Cid OT Occupational Therapist                  UQLymphgoals:   1. Uncontrolled lymphedema of Left upper extremity/upper quadrant.  LTG1:  90 days:  Volumetric measurements of left upper extremity/upper quadrant will be decreased by 5 % or until plateau in reduction is achieved to improve functional mobility.             STATUS:  on going              STG1a:  30 days:  Volumetric measurements of left upper extremity/upper quadrant will be decreased by 3 % bilaterally to improve functional mobility.    STATUS:  on going  TREATMENT:  Manual Therapy, Therapeutic exercise, ADL/self-care therapy, Bioimpedence Fluid Analysis, Orthotic management and training, Therapeutic Activity, wound dressing as needed, Modalities: TENS, NMES, Ultrasound, Fluidotherapy, and Patient and family/caregiver education.     2. Patient with decreased ADL/Self-Care routine for lymphedema management.              LTG 2:  90 days:  Patient/caregiver will independently verbalize/demonstrate ADL/Self-Care routine for lymphedema management.                      STATUS:  on going              STG 2a:  30 days:  Patient/caregiver will independently perform or verbally dictate compression wrapping technique of the upper extremity/upper  quadrant.                      STATUS:  on going              STG2b:  30 days:  Patient will independently verbalize signs and symptoms of infection.                            STATUS:  on going              STG2c:  30 days:  Patient will independently demonstrate/verbalize proper skin care routine to prevent infection and or wound development.                       STATUS:  on going  STG2d:  30 days:  Patient will independently perform teach back of HEP routine.                      STATUS: on going  STG2e:  30 days:  Patient/caregiver will obtain properly fitting compression orthosis, will demonstrate don/doff skill of compression orthosis with modified independence, and independently verbalize wear schedule.          STATUS: on going  STG2f: 30 days: Patient/caregiver will independently perform self-manual lymphatic drainage of the upper extremity/upper quadrant.          STATUS: on going  TREATMENT:  Therapeutic Activity, Patient/Caregiver Education, ADL retraining, Manual Therapy, Orthotic Management and training, Modalities: TENS, NMES, Ultrasound, Fluidotherapy Wound dressing if necessary, Therapeutic Exercise, Modalities     3. Self-Care Limitations                                    LTG 3: 90 days:  The patient will demonstrate improved quality of life by achieving a score of 17 on the Lymphedema Life Impact Scale.                      STATUS:  on going              STG 3a: 30 days:  The patient will demonstrate improved quality of life by achieving a score of 20 on the Lymphedema Life Impact Scale.                      STATUS:  on going  TREATMENT:  Manual therapy, therapeutic exercise, therapeutic activity, ADL retraining, Patient/caregiver education, Modalities: TENS, NMES, Ultrasound, Fluidotherapy, Orthotic Management and Training, wound dressing as needed.   OT Assessment/Plan       Row Name 11/25/24 1016 11/25/24 0839       OT Assessment    Functional Limitations Limitations in functional capacity  and performance  -TD Limitations in functional capacity and performance  -TD    Impairments Impaired lymphatic circulation;Range of motion  -TD Impaired lymphatic circulation;Range of motion  -TD    Assessment Comments Solange is 49 year old female with a diagnosis of left breast cancer. Solange underwent a bilateral mastectomy with reconstruction in 8/2018. Patient had 5 lymphnodes removed at this time. Pt has no complaints of cording at this time.  Pt only difficulty with is her shoulder. Pt is to call back if she has any needs for future treatment. Patient would benefit from continued skilled OT to prevent increased staging of lymphedema, decreased AROM, and increased pain.  -TD Solange is 49 year old female with a diagnosis of left breast cancer. Solange underwent a bilateral mastectomy with reconstruction in 8/2018. Patient had 5 lymphnodes removed at this time. Pt reports that she feels much better with a little tightness at end ranges. Cords were present this date. Therapist was able to access and treat cords this date. Patient would benefit from continued skilled OT to prevent increased staging of lymphedema, decreased AROM, and increased pain.  -TD    OT Diagnosis at risk for lymphedema  -TD at risk for lymphedema  -TD    OT Rehab Potential Good  -TD Good  -TD    Patient/caregiver participated in establishment of treatment plan and goals Yes  -TD Yes  -TD    Patient would benefit from skilled therapy intervention Yes  -TD Yes  -TD       OT Plan    OT Frequency -- --  see duration  -TD    Predicted Duration of Therapy Intervention (OT) Pt will be seen in 6 months  -TD Pt will be seen every 6 months  -TD    Planned CPT's? OT EVAL LOW COMPLEXITY: 00690;OT RE-EVAL: 45579;OT THER ACT EA 15 MIN: 83106KT;OT THER PROC EA 15 MIN: 33929RV;OT SELF CARE/MGMT/TRAIN 15 MIN: 41108;OT MANUAL THERAPY EA 15 MIN: 61750;OT ORTHOTIC MGMT/TRAIN EA 15 MIN: 18615;OT ORTHO/PROSTHET CHECKOUT EA 15 MIN: 75244;OT CARE PLAN EA 15 MIN  -TD OT EVAL  LOW COMPLEXITY: 19810;OT RE-EVAL: 60380;OT THER ACT EA 15 MIN: 06005JX;OT THER PROC EA 15 MIN: 63600OV;OT SELF CARE/MGMT/TRAIN 15 MIN: 21216;OT MANUAL THERAPY EA 15 MIN: 97015;OT ORTHOTIC MGMT/TRAIN EA 15 MIN: 49429;OT ORTHO/PROSTHET CHECKOUT EA 15 MIN: 50616;OT CARE PLAN EA 15 MIN  -TD    Planned Therapy Interventions (Optional Details) manual therapy techniques;stretching;strengthening;ROM (Range of Motion);prosthetic fitting/training;patient/family education;orthotic fitting/training  -TD manual therapy techniques;stretching;strengthening;ROM (Range of Motion);prosthetic fitting/training;patient/family education;orthotic fitting/training  -TD    OT Plan Comments continue POC  -TD continue POC  -TD              User Key  (r) = Recorded By, (t) = Taken By, (c) = Cosigned By      Initials Name Provider Type    TD Alina Del Cid OT Occupational Therapist                              Time Calculation:   Timed Charges  49535 - OT Self Care/Mgmt Minutes: 15  Total Minutes  Timed Charges Total Minutes: 15   Total Minutes: 15     Therapy Charges for Today       Code Description Service Date Service Provider Modifiers Qty    [id number] HC OT SELF CARE/MGMT/TRAIN EA 15 MIN 11/25/2024 Alina Del Cid OT GO 1                      Alina Del Cid OT  11/25/2024

## 2024-11-25 NOTE — PROGRESS NOTES
"Chief Complaint  Follow-up and Pain of the Left Shoulder    Subjective      Solange Srivastava presents to [facility] for follow up of her left shoulder.  This is a workers comp injury.  During her last office visit on 10/30/2024 patient had MRI of the left shoulder that was ordered and she is here today to review.  To reflect, patient has been treating her left shoulder pain rotator cuff tendinitis with intermittent injections.  Patient received a left shoulder steroid injection prior and reports that the injection made her pain worse.  Patient has pain that radiates up and down her arm and into her neck.  She does report sometimes she experiences intermittent numbness that radiates down to her fingers.  Patient has been taking anti-inflammatories, utilizing her home exercise program and has tried steroid injections without significant relief.     Allergies   Allergen Reactions    Azo [Phenazopyridine] Unknown (See Comments)     Unknown     Sulfa Antibiotics Unknown (See Comments)     Unknown        Objective     Vital Signs:   Vitals:    11/25/24 0835   BP: 105/63   Pulse: 80   SpO2: 96%   Weight: 63.5 kg (140 lb)   Height: 156.2 cm (61.5\")   PainSc:   4     Body mass index is 26.02 kg/m².    I reviewed the patient's chief complaint, history of present illness, review of systems, past medical history, surgical history, family history, social history, medications, and allergy list.     Ortho Exam    General: Alert. No acute distress.  Left upper Extremity: 180 degrees active elevation. External rotation to 45 degrees. Internal rotation to mid thoracic.  90 degrees pronation and supination. Demonstrates intact active elbow ROM. Demonstrates intact active wrist ROM. Sensation intact. Palpable radial pulse. Neurovascularly intact.      Large Joint Arthrocentesis: L glenohumeral  Date/Time: 11/25/2024 12:18 PM  Consent given by: patient  Site marked: site marked  Timeout: Immediately prior to " procedure a time out was called to verify the correct patient, procedure, equipment, support staff and site/side marked as required   Supporting Documentation  Indications: pain   Procedure Details  Location: shoulder - L glenohumeral  Preparation: Patient was prepped and draped in the usual sterile fashion  Needle gauge: 21 G.  Medications administered: 40 mg triamcinolone acetonide 40 MG/ML; 5 mL lidocaine 1 %  Patient tolerance: patient tolerated the procedure well with no immediate complications         MRI Shoulder Left Without Contrast  Narrative: MRI SHOULDER LEFT WO CONTRAST    Date of Exam: 11/18/2024 7:48 AM EST    Indication: left shoulder pain, left shoulder decreased range of motion.     Comparison: Radiograph September 18, 2024    Technique:  Routine multiplanar/multisequence images of the left shoulder were obtained without contrast administration.      Findings:  Rotator cuff:  There is thickening and intermediate signal of the distal supraspinatus and to a lesser extent the distal infraspinatus consistent with tendinopathy. There is enthesopathy at the anterior greater tuberosity with small subcortical cyst and subcortical   edema.    At the posterior supraspinatus insertion there appears to be a small fluid signal defect (primarily within the intrasubstance tendon on coronal image 12 of series 301 and sagittal series 501 image 4). This does appear to extend to the bursal surface at   the anterior aspect on coronal image 13. Overall the finding measures approximately 7 mm in anterior to posterior dimensions. There is possible additional shallow articular surface defect of the distal mid supraspinatus on coronal image 12. No definite   full-thickness tendon defect.    There is subscapularis tendinopathy. There is suspected intrasubstance defect at the superior subscapularis tendon insertion with enthesopathy at the lesser tuberosity. Teres minor appears intact. No significant muscle edema or  atrophy.    Glenohumeral joint:  There is slight superior and posterior subluxation of the numeral head. No significant joint effusion. No findings of significant glenohumeral arthropathy.    There is increased signal in the superior labrum on coronal images suspicious for tear extending anterior to posterior. No paralabral cyst is seen.    Biceps tendon:  There is tendinopathy of the biceps tendon. There is suspicion for partial tear of the distal intra-articular tendon extending into the upper bicipital groove on axial images 14-17. Small amount of fluid in the tendon sheath appears disproportionate to   joint fluid suspicious for tenosynovitis. The tendon appears normally positioned.    Acromioclavicular Joint:  Mild degenerative changes at the acromioclavicular joint. No significant periarticular edema signal. Alignment appears within normal limits.    Bone:  Mild enthesopathy at the greater and lesser tuberosities. No definite fracture. No other suspicious marrow signal abnormality.    Miscellaneous:  Trace fluid in the subacromial/subdeltoid bursa. No significant deltoid muscle atrophy or edema. No pathologically enlarged axillary lymph nodes.  Impression: Impression:  1.Rotator cuff tendinopathy with suspected small intrasubstance and bursal surface tear at the posterior supraspinatus insertion and possible shallow articular surface defect of the distal mid supraspinatus.  2.Subscapularis tendinopathy and suspected intrasubstance defect at the superior insertion. No definite full-thickness rotator cuff tendon defect.  3.Tendinopathy and partial tear of the long head of the biceps tendon with suspected tenosynovitis.  4.Findings suggestive of tear of the superior labrum.   5.Mild acromioclavicular joint degeneration.    Electronically Signed: Nate Fuller    11/20/2024 9:20 AM EST    Workstation ID: [id number]          Assessment and Plan   Diagnoses and all orders for this visit:    1. Superior glenoid labrum  lesion of left shoulder, initial encounter (Primary)  -     Ambulatory Referral to Physical Therapy for Evaluation & Treatment  -     Large Joint Arthrocentesis: L glenohumeral  -     lidocaine (XYLOCAINE) 1 % injection 5 mL  -     triamcinolone acetonide (KENALOG-40) injection 40 mg    2. Left shoulder pain, unspecified chronicity  -     Ambulatory Referral to Physical Therapy for Evaluation & Treatment    3. Rotator cuff tendonitis, left  -     Ambulatory Referral to Physical Therapy for Evaluation & Treatment    4. Cervical pain (neck)    Other orders  -     Cancel: Large Joint Arthrocentesis  -     Large Joint Arthrocentesis: L glenohumeral         Solange Srivastava presents to [facility] for her left shoulder.  MRI reviewed.    We discussed further conservative management for their left shoulder pain/rotator cuff tendinitis. This includes but is not limited to - oral NSAIDs, topical NSAIDs, PT/home exercise program, intermittent steroid injections. Continue home exercises provided/reviewed at previous visit. Discussed NSAID use, precautions, and recommendations on taking.  Encouraged patient to attend formal physical therapy, an order placed today.     Patient would like to begin taking Ibuprofen scheduled rather than PRN as she has been, as well as Tumeric before trialing any prescription NSAIDs.  Advised not to take with additional NSAIDs (aleve, naproxen, etc) and to take with food. Patient voiced no known CKD, anti-platelet or anticoagulant use, or GI bleed/ulcer history.     Patient elected to proceed with repeat left shoulder steroid injection today in office. Patient is previously aware of risks, benefits and alternatives of injections. Patient was informed of possible adverse effects including but not limited to bleeding, damage to nerve, tendon or artery, increased blood sugar and increased blood pressure. Discussed possibility of a reaction from the injection.  Discussed the  possibility that the injection may not completely improve or remove the pain.  Discussed the risk of infection.  Discussed the possibility of worsening pain after the injection.  Informed consent obtained.  Time out was performed. Chlorhexidine was swabbed at injection site per typical technique. Needle injected into bursa, aspiration was performed and then left shoulder steroid slowly injected into joint space, fluid was free flowing. Needle was removed, band-aid placed to injection site.  Patient tolerated injection well with no complications.     Begin outpatient PT. Will have PT work on the neck some too.     Follow-up in 6 weeks. No x-rays needed. Will determine if need for neck imaging at that time.         Tobacco Use: Medium Risk (11/25/2024)    Patient History     Smoking Tobacco Use: Former     Smokeless Tobacco Use: Never     Passive Exposure: Not on file     Patient reports tobacco use. Tobacco use can delay healing and complicate the recovery process. Recommended tobacco cessation for optimal healing and health benefits.            Follow Up   Return in about 6 weeks (around 1/6/2025).  There are no Patient Instructions on file for this visit.    Patient was given instructions and counseling regarding her condition or for health maintenance advice. Please see specific information pulled into the AVS if appropriate.       Dictated Utilizing Dragon Dictation. Please note that portions of this note were completed with a voice recognition program. Part of this note may be an electronic transcription/translation of spoken language to printed text using the Dragon Dictation System.

## 2024-12-10 ENCOUNTER — OFFICE VISIT (OUTPATIENT)
Dept: GASTROENTEROLOGY | Facility: CLINIC | Age: 49
End: 2024-12-10
Payer: MEDICARE

## 2024-12-10 VITALS
SYSTOLIC BLOOD PRESSURE: 110 MMHG | DIASTOLIC BLOOD PRESSURE: 67 MMHG | WEIGHT: 139.4 LBS | HEIGHT: 61 IN | BODY MASS INDEX: 26.32 KG/M2 | HEART RATE: 80 BPM

## 2024-12-10 DIAGNOSIS — R10.11 RIGHT UPPER QUADRANT ABDOMINAL PAIN: ICD-10-CM

## 2024-12-10 DIAGNOSIS — R10.31 RIGHT LOWER QUADRANT ABDOMINAL PAIN: ICD-10-CM

## 2024-12-10 DIAGNOSIS — R10.13 DYSPEPSIA: ICD-10-CM

## 2024-12-10 DIAGNOSIS — R19.4 CHANGE IN BOWEL HABITS: Primary | ICD-10-CM

## 2024-12-10 DIAGNOSIS — K22.70 BARRETT'S ESOPHAGUS WITHOUT DYSPLASIA: ICD-10-CM

## 2024-12-10 RX ORDER — POLYETHYLENE GLYCOL 3350, SODIUM SULFATE, POTASSIUM CHLORIDE, MAGNESIUM SULFATE, AND SODIUM CHLORIDE FOR ORAL SOLUTION 178.7-7.3G
178.7 KIT ORAL TAKE AS DIRECTED
Qty: 1 EACH | Refills: 0 | Status: SHIPPED | OUTPATIENT
Start: 2024-12-10

## 2024-12-10 RX ORDER — MIRABEGRON 25 MG/1
25 TABLET, FILM COATED, EXTENDED RELEASE ORAL DAILY
COMMUNITY
Start: 2024-12-10 | End: 2025-01-09

## 2024-12-10 NOTE — PROGRESS NOTES
Chief Complaint     Constipation    History of Present Illness     Solange Srivastava is a 49 y.o. female who presents to [facility] GASTRO[facility]OLOGY for follow-up of monk's, dyspepsia, abdominal bloating and irregular bowel habits.      She reports pain in right side that radiates to lower abdomen. This is present daily.  She describes this to wax and wane.   She is concerned and feels that something is not right due to discomfort and change in bowel pattern.  She is having a bowel movement every 4-5 days.  Describes stool as a #4 on the bristol stool chart.  Denies rectal bleeding.     Admits belching that occurs frequently.  Heartburn is controlled with omeprazole daily.            History      Past Medical History:   Diagnosis Date    Anxiety     Arthritis     Breast cancer     Claustrophobia     Cold sore     Colitis     Depression     Fatty liver     Night sweats     PONV (postoperative nausea and vomiting)     has had scope patch before    Sciatic leg pain     left side    Seasonal allergies     Sinus trouble     Sprain of ankle, right 01/25/2018    Tobacco use      Past Surgical History:   Procedure Laterality Date    ABDOMINOPLASTY  10/15/2019    AMPUTATION HAND Right     APPENDECTOMY      BREAST BIOPSY Bilateral     BREAST RECONSTRUCTION  2018    with tissue expander     FAT GRAFTING  1/23/19,6/4/19,10/15/1    an excess skin excision     HAND SURGERY Bilateral 2010    thumb amputation     HYSTERECTOMY      left one ovary    LUMBAR DISCECTOMY Left 12/23/2022    Procedure: MINIMALLY INVASIVE LUMBAR DISCECTOMY, left approach, lumbar 4-lumbar 5;  Surgeon: Randy Garcia MD;  Location: [facility] OR;  Service: Neurosurgery;  Laterality: Left;    MASTECTOMY Bilateral 2018    NIPPLE RECONSTRUCTION  06/04/2019    THYROID SURGERY      UPPER GASTROINTESTINAL ENDOSCOPY       Family History   Problem Relation Age of Onset    Prostate cancer Brother 38    Diabetes Brother     Breast cancer Maternal Aunt  "55    Melanoma Maternal Aunt 45    Stroke Father     Diabetes Maternal Grandmother     Breast cancer Other         malignant    Melanoma Other     Diabetes Maternal Aunt         Current Medications       Current Outpatient Medications:     ascorbic acid (VITAMIN C) 500 MG capsule controlled-release CR capsule, Vitamin C 500 mg oral capsule, extended release take 1 capsule by oral route daily   Suspended, Disp: , Rfl:     Calcium Carbonate (CALCIUM 600 PO), Take  by mouth., Disp: , Rfl:     Cholecalciferol 25 MCG (1000 UT) capsule, Vitamin D3 1,000 unit oral capsule take 1 capsule by oral route daily   Active, Disp: , Rfl:     Mirabegron ER (MYRBETRIQ) 25 MG tablet sustained-release 24 hour 24 hr tablet, Take 1 tablet by mouth Daily., Disp: , Rfl:     Omega-3 Fatty Acids (FISH OIL) 1000 MG capsule capsule, Take  by mouth Daily With Breakfast., Disp: , Rfl:     omeprazole (priLOSEC) 20 MG capsule, Take 1 capsule by mouth Daily. before a meal, Disp: 90 capsule, Rfl: 2    vitamin E 100 UNIT capsule, vitamin E 100 unit oral capsule take 1 capsule by oral route daily   Active, Disp: , Rfl:     PEG 3350-KCl-NaCl-NaSulf-MgSul (Suflave) 178.7 g reconstituted solution, Take 178.7 g by mouth Take As Directed., Disp: 1 each, Rfl: 0     Allergies     Allergies   Allergen Reactions    Azo [Phenazopyridine] Unknown (See Comments)     Unknown     Sulfa Antibiotics Unknown (See Comments)     Unknown        Social History       Social History     Social History Narrative    Not on file         Objective       /67 (BP Location: Left arm, Patient Position: Sitting, Cuff Size: Adult)   Pulse 80   Ht 156.2 cm (61.5\")   Wt 63.2 kg (139 lb 6.4 oz)   BMI 25.92 kg/m²       Physical Exam  Constitutional:       General: She is not in acute distress.     Appearance: Normal appearance. She is well-developed and normal weight.   HENT:      Head: Normocephalic and atraumatic.   Eyes:      Conjunctiva/sclera: Conjunctivae normal.      " Pupils: Pupils are equal, round, and reactive to light.      Visual Fields: Right eye visual fields normal and left eye visual fields normal.   Cardiovascular:      Rate and Rhythm: Normal rate.   Pulmonary:      Effort: Pulmonary effort is normal. No respiratory distress or retractions.      Breath sounds: Normal air entry.   Abdominal:      General: There is no distension.      Palpations: Abdomen is soft.      Tenderness: There is abdominal tenderness (RLQ).   Musculoskeletal:         General: Normal range of motion.      Right lower leg: No edema.      Left lower leg: No edema.   Skin:     General: Skin is warm and dry.      Findings: No lesion.   Neurological:      General: No focal deficit present.      Mental Status: She is alert and oriented to person, place, and time.   Psychiatric:         Mood and Affect: Mood and affect normal.         Behavior: Behavior normal.         Results       Result Review :    The following data was reviewed by: JASSI Plascencia on 12/10/2024:    7/24/2024 CBC: Hemoglobin 15, hematocrit 44.2, platelets 256.  CMP: Creatinine 0.63, alk phos 116, AST 18, ALT 17, total bilirubin 0.6.                 Assessment and Plan              Diagnoses and all orders for this visit:    1. Change in bowel habits (Primary)  -     CT Abdomen Pelvis With Contrast; Future  -     Case Request; Standing  -     Case Request    2. Right lower quadrant abdominal pain  -     CT Abdomen Pelvis With Contrast; Future  -     Case Request; Standing  -     Case Request    3. Ying's esophagus without dysplasia  -     omeprazole (priLOSEC) 20 MG capsule; Take 1 capsule by mouth Daily. before a meal  Dispense: 90 capsule; Refill: 2  -     Case Request; Standing  -     Case Request    4. Dyspepsia  -     Case Request; Standing  -     Case Request    5. Right upper quadrant abdominal pain  -     CT Abdomen Pelvis With Contrast; Future  -     Case Request; Standing  -     Case Request    Other orders  -      Follow Anesthesia Guidelines / Protocol; Future  -     Verify NPO; Standing  -     Verify Bowel Prep Was Successful; Standing  -     Give Tap Water Enema If Bowel Prep Insufficient; Standing  -     PEG 3350-KCl-NaCl-NaSulf-MgSul (Suflave) 178.7 g reconstituted solution; Take 178.7 g by mouth Take As Directed.  Dispense: 1 each; Refill: 0          ESOPHAGOGASTRODUODENOSCOPY (N/A), COLONOSCOPY (N/A)  The risk of the endoscopy were discussed in detail. Possible risks/complications, benefits, and alternatives to surgical or invasive procedure have been explained to patient and/or legal guardian; risks include bleeding, infection, and perforation. Patient has been evaluated and can tolerate anesthesia and/or sedation.       Follow Up     Follow Up   Return in about 6 months (around 6/10/2025) for monk's esophagus, abdominal pain.  Patient was given instructions and counseling regarding her condition or for health maintenance advice. Please see specific information pulled into the AVS if appropriate.

## 2024-12-12 ENCOUNTER — TRANSCRIBE ORDERS (OUTPATIENT)
Dept: ADMINISTRATIVE | Facility: HOSPITAL | Age: 49
End: 2024-12-12
Payer: MEDICARE

## 2024-12-12 DIAGNOSIS — R10.2 PELVIC PAIN: Primary | ICD-10-CM

## 2024-12-13 ENCOUNTER — HOSPITAL ENCOUNTER (EMERGENCY)
Facility: HOSPITAL | Age: 49
Discharge: HOME | End: 2024-12-13
Attending: EMERGENCY MEDICINE
Payer: MEDICARE

## 2024-12-13 VITALS
RESPIRATION RATE: 16 BRPM | WEIGHT: 140.87 LBS | DIASTOLIC BLOOD PRESSURE: 73 MMHG | BODY MASS INDEX: 25.92 KG/M2 | HEART RATE: 68 BPM | TEMPERATURE: 98.1 F | OXYGEN SATURATION: 100 % | SYSTOLIC BLOOD PRESSURE: 114 MMHG | HEIGHT: 62 IN

## 2024-12-13 DIAGNOSIS — M62.838 MUSCLE SPASM: Primary | ICD-10-CM

## 2024-12-13 LAB
ALBUMIN SERPL-MCNC: 4.2 G/DL (ref 3.5–5.2)
ALBUMIN/GLOB SERPL: 1.6 G/DL
ALP SERPL-CCNC: 85 U/L (ref 39–117)
ALT SERPL W P-5'-P-CCNC: 12 U/L (ref 1–33)
ANION GAP SERPL CALCULATED.3IONS-SCNC: 8.1 MMOL/L (ref 5–15)
AST SERPL-CCNC: 16 U/L (ref 1–32)
BASOPHILS # BLD AUTO: 0.03 10*3/MM3 (ref 0–0.2)
BASOPHILS NFR BLD AUTO: 0.4 % (ref 0–1.5)
BILIRUB SERPL-MCNC: 0.4 MG/DL (ref 0–1.2)
BILIRUB UR QL STRIP: NEGATIVE
BUN SERPL-MCNC: 18 MG/DL (ref 6–20)
BUN/CREAT SERPL: 27.7 (ref 7–25)
CALCIUM SPEC-SCNC: 9.2 MG/DL (ref 8.6–10.5)
CHLORIDE SERPL-SCNC: 103 MMOL/L (ref 98–107)
CLARITY UR: CLEAR
CO2 SERPL-SCNC: 28.9 MMOL/L (ref 22–29)
COLOR UR: YELLOW
CREAT SERPL-MCNC: 0.65 MG/DL (ref 0.57–1)
DEPRECATED RDW RBC AUTO: 43.8 FL (ref 37–54)
EGFRCR SERPLBLD CKD-EPI 2021: 109.4 ML/MIN/1.73
EOSINOPHIL # BLD AUTO: 0.1 10*3/MM3 (ref 0–0.4)
EOSINOPHIL NFR BLD AUTO: 1.5 % (ref 0.3–6.2)
ERYTHROCYTE [DISTWIDTH] IN BLOOD BY AUTOMATED COUNT: 13.2 % (ref 12.3–15.4)
GLOBULIN UR ELPH-MCNC: 2.6 GM/DL
GLUCOSE SERPL-MCNC: 96 MG/DL (ref 65–99)
GLUCOSE UR STRIP-MCNC: NEGATIVE MG/DL
HCT VFR BLD AUTO: 40.8 % (ref 34–46.6)
HGB BLD-MCNC: 13.5 G/DL (ref 12–15.9)
HGB UR QL STRIP.AUTO: NEGATIVE
HOLD SPECIMEN: NORMAL
IMM GRANULOCYTES # BLD AUTO: 0.01 10*3/MM3 (ref 0–0.05)
IMM GRANULOCYTES NFR BLD AUTO: 0.1 % (ref 0–0.5)
KETONES UR QL STRIP: NEGATIVE
LEUKOCYTE ESTERASE UR QL STRIP.AUTO: NEGATIVE
LYMPHOCYTES # BLD AUTO: 2.92 10*3/MM3 (ref 0.7–3.1)
LYMPHOCYTES NFR BLD AUTO: 43.1 % (ref 19.6–45.3)
MAGNESIUM SERPL-MCNC: 2.2 MG/DL (ref 1.6–2.6)
MCH RBC QN AUTO: 29.9 PG (ref 26.6–33)
MCHC RBC AUTO-ENTMCNC: 33.1 G/DL (ref 31.5–35.7)
MCV RBC AUTO: 90.5 FL (ref 79–97)
MONOCYTES # BLD AUTO: 0.39 10*3/MM3 (ref 0.1–0.9)
MONOCYTES NFR BLD AUTO: 5.8 % (ref 5–12)
NEUTROPHILS NFR BLD AUTO: 3.32 10*3/MM3 (ref 1.7–7)
NEUTROPHILS NFR BLD AUTO: 49.1 % (ref 42.7–76)
NITRITE UR QL STRIP: NEGATIVE
NRBC BLD AUTO-RTO: 0 /100 WBC (ref 0–0.2)
PH UR STRIP.AUTO: 7 [PH] (ref 5–8)
PLATELET # BLD AUTO: 263 10*3/MM3 (ref 140–450)
PMV BLD AUTO: 9.8 FL (ref 6–12)
POTASSIUM SERPL-SCNC: 3.7 MMOL/L (ref 3.5–5.2)
PROT SERPL-MCNC: 6.8 G/DL (ref 6–8.5)
PROT UR QL STRIP: NEGATIVE
RBC # BLD AUTO: 4.51 10*6/MM3 (ref 3.77–5.28)
SODIUM SERPL-SCNC: 140 MMOL/L (ref 136–145)
SP GR UR STRIP: 1.01 (ref 1–1.03)
UROBILINOGEN UR QL STRIP: NORMAL
WBC NRBC COR # BLD AUTO: 6.77 10*3/MM3 (ref 3.4–10.8)
WHOLE BLOOD HOLD COAG: NORMAL

## 2024-12-13 PROCEDURE — 99283 EMERGENCY DEPT VISIT LOW MDM: CPT

## 2024-12-13 PROCEDURE — 80053 COMPREHEN METABOLIC PANEL: CPT

## 2024-12-13 PROCEDURE — 85025 COMPLETE CBC W/AUTO DIFF WBC: CPT

## 2024-12-13 PROCEDURE — 83735 ASSAY OF MAGNESIUM: CPT

## 2024-12-13 PROCEDURE — 81003 URINALYSIS AUTO W/O SCOPE: CPT

## 2024-12-13 RX ORDER — METHOCARBAMOL 500 MG/1
1000 TABLET, FILM COATED ORAL ONCE
Status: COMPLETED | OUTPATIENT
Start: 2024-12-13 | End: 2024-12-13

## 2024-12-13 RX ORDER — HYDROXYZINE HYDROCHLORIDE 25 MG/1
25 TABLET, FILM COATED ORAL 3 TIMES DAILY PRN
Qty: 90 TABLET | Refills: 0 | Status: SHIPPED | OUTPATIENT
Start: 2024-12-13 | End: 2025-01-12

## 2024-12-13 RX ORDER — METHOCARBAMOL 500 MG/1
500 TABLET, FILM COATED ORAL 4 TIMES DAILY PRN
Qty: 40 TABLET | Refills: 0 | Status: SHIPPED | OUTPATIENT
Start: 2024-12-13 | End: 2024-12-23

## 2024-12-13 RX ORDER — HYDROXYZINE HYDROCHLORIDE 25 MG/1
50 TABLET, FILM COATED ORAL ONCE
Status: COMPLETED | OUTPATIENT
Start: 2024-12-13 | End: 2024-12-13

## 2024-12-13 RX ADMIN — METHOCARBAMOL 1000 MG: 500 TABLET ORAL at 16:22

## 2024-12-13 RX ADMIN — HYDROXYZINE HYDROCHLORIDE 50 MG: 25 TABLET, FILM COATED ORAL at 16:22

## 2024-12-13 NOTE — DISCHARGE INSTRUCTIONS
You were evaluated in the emergency department today for muscle spasms.  At this time I do not have a great explanation of why this is happening, I have sent hydroxyzine and muscle relaxer to your pharmacy which you can try to see if it helps.  I would recommend following up with your primary care provider.  Your blood work is reassuring today, all electrolytes are normal.

## 2024-12-13 NOTE — ED PROVIDER NOTES
Time: 4:47 PM EST  Date of encounter:  12/13/2024  Independent Historian/Clinical History and Information was obtained by:   Patient    History is limited by: N/A    Chief Complaint: Muscle spasms      History of Present Illness:  Patient is a 49 y.o. year old female who presents to the emergency department for evaluation of muscle spasms.  Patient reports that last night she began having muscle spasms in her bilateral lower extremities, relates them to restless leg syndrome.  Reports that she does not have history of restless leg but many people in her family do.  Patient reports that today the spasms have progressed up to her bilateral upper extremities as well.  Patient reports that she feels like she is having a panic attack, states that she has not had this for multiple years.  Denies any new home stressors but does states she has a lot going on with her health lately.  Denies starting any new medications, reports that she took Myrbetriq 1 month ago but symptoms were not present then.  States she only takes vitamins and supplements, when symptoms began last night she did take some magnesium.  Patient is curious if anything neurologically is going on.  She denies fever, chills, dysuria, leg weakness, facial droop, paresthesias.      Patient Care Team  Primary Care Provider: Sis Lomax APRN    Past Medical History:     Allergies   Allergen Reactions    Azo [Phenazopyridine] Unknown (See Comments)     Unknown     Sulfa Antibiotics Unknown (See Comments)     Unknown      Past Medical History:   Diagnosis Date    Anxiety     Arthritis     Breast cancer     Claustrophobia     Cold sore     Colitis     Depression     Fatty liver     Night sweats     PONV (postoperative nausea and vomiting)     has had scope patch before    Sciatic leg pain     left side    Seasonal allergies     Sinus trouble     Sprain of ankle, right 01/25/2018    Tobacco use      Past Surgical History:   Procedure Laterality Date     ABDOMINOPLASTY  10/15/2019    AMPUTATION HAND Right     APPENDECTOMY      BREAST BIOPSY Bilateral     BREAST RECONSTRUCTION  2018    with tissue expander     FAT GRAFTING  1/23/19,6/4/19,10/15/1    an excess skin excision     HAND SURGERY Bilateral 2010    thumb amputation     HYSTERECTOMY      left one ovary    LUMBAR DISCECTOMY Left 12/23/2022    Procedure: MINIMALLY INVASIVE LUMBAR DISCECTOMY, left approach, lumbar 4-lumbar 5;  Surgeon: Randy Garcia MD;  Location: [facility] OR;  Service: Neurosurgery;  Laterality: Left;    MASTECTOMY Bilateral 2018    NIPPLE RECONSTRUCTION  06/04/2019    THYROID SURGERY      UPPER GASTROINTESTINAL ENDOSCOPY       Family History   Problem Relation Age of Onset    Prostate cancer Brother 38    Diabetes Brother     Breast cancer Maternal Aunt 55    Melanoma Maternal Aunt 45    Stroke Father     Diabetes Maternal Grandmother     Breast cancer Other         malignant    Melanoma Other     Diabetes Maternal Aunt        Home Medications:  Prior to Admission medications    Medication Sig Start Date End Date Taking? Authorizing Provider   ascorbic acid (VITAMIN C) 500 MG capsule controlled-release CR capsule Vitamin C 500 mg oral capsule, extended release take 1 capsule by oral route daily   Suspended    Provider, MD Damion   Calcium Carbonate (CALCIUM 600 PO) Take  by mouth.    Provider, MD Damion   Cholecalciferol 25 MCG (1000 UT) capsule Vitamin D3 1,000 unit oral capsule take 1 capsule by oral route daily   Active    Provider, MD Damion   hydrOXYzine (ATARAX) 25 MG tablet Take 1 tablet by mouth 3 (Three) Times a Day As Needed for Anxiety for up to 30 days. 12/13/24 1/12/25  Saida Romeo PA-C   methocarbamol (ROBAXIN) 500 MG tablet Take 1 tablet by mouth 4 (Four) Times a Day As Needed for Muscle Spasms for up to 10 days. 12/13/24 12/23/24  Saida Romeo PA-C   Mirabegron ER (MYRBETRIQ) 25 MG tablet sustained-release 24 hour 24 hr tablet Take 1 tablet  by mouth Daily. 12/10/24 1/9/25  ProviderDamion MD   Omega-3 Fatty Acids (FISH OIL) 1000 MG capsule capsule Take  by mouth Daily With Breakfast.    ProviderDamion MD   omeprazole (priLOSEC) 20 MG capsule Take 1 capsule by mouth Daily. before a meal 12/10/24   Charlee Willis APRN   PEG 3350-KCl-NaCl-NaSulf-MgSul (Suflave) 178.7 g reconstituted solution Take 178.7 g by mouth Take As Directed. 12/10/24   Charlee Willis APRN   vitamin E 100 UNIT capsule vitamin E 100 unit oral capsule take 1 capsule by oral route daily   Active    ProviderDamion MD        Social History:   Social History     Tobacco Use    Smoking status: Former     Current packs/day: 0.25     Average packs/day: 0.3 packs/day for 25.0 years (6.3 ttl pk-yrs)     Types: Cigarettes    Smokeless tobacco: Never   Vaping Use    Vaping status: Never Used   Substance Use Topics    Alcohol use: Yes     Comment: socially    Drug use: No         Review of Systems:  Review of Systems   Constitutional:  Negative for chills, fatigue and fever.   HENT:  Negative for congestion, ear pain, sinus pain and trouble swallowing.    Eyes:  Negative for photophobia, pain and visual disturbance.   Respiratory:  Negative for cough, shortness of breath and wheezing.    Cardiovascular:  Negative for chest pain and leg swelling.   Gastrointestinal:  Negative for abdominal pain, nausea and vomiting.   Genitourinary:  Negative for dysuria, hematuria and vaginal bleeding.   Musculoskeletal:  Positive for myalgias. Negative for arthralgias, back pain, gait problem, joint swelling, neck pain and neck stiffness.   Skin:  Negative for color change, rash and wound.   Allergic/Immunologic: Negative for immunocompromised state.   Neurological:  Negative for dizziness, tremors, seizures, syncope, facial asymmetry, speech difficulty, weakness, light-headedness, numbness and headaches.   Hematological:  Does not bruise/bleed easily.   Psychiatric/Behavioral:   "Negative for agitation and confusion.         Physical Exam:  /73 (BP Location: Left arm, Patient Position: Sitting)   Pulse 68   Temp 98.1 °F (36.7 °C) (Oral)   Resp 16   Ht 157.9 cm (62.15\")   Wt 63.9 kg (140 lb 14 oz)   SpO2 100%   BMI 25.64 kg/m²     Physical Exam  Vitals and nursing note reviewed.   Constitutional:       General: She is not in acute distress.     Appearance: Normal appearance. She is not ill-appearing.   HENT:      Head: Normocephalic and atraumatic.      Right Ear: External ear normal.      Left Ear: External ear normal.      Nose: Nose normal.      Mouth/Throat:      Mouth: Mucous membranes are moist.   Eyes:      Extraocular Movements: Extraocular movements intact.      Conjunctiva/sclera: Conjunctivae normal.      Pupils: Pupils are equal, round, and reactive to light.   Cardiovascular:      Rate and Rhythm: Normal rate and regular rhythm.   Pulmonary:      Effort: Pulmonary effort is normal.   Abdominal:      General: There is no distension.      Palpations: Abdomen is soft.      Tenderness: There is no abdominal tenderness. There is no guarding or rebound.   Musculoskeletal:         General: No swelling or tenderness. Normal range of motion.      Cervical back: Normal range of motion and neck supple.      Right lower leg: No edema.      Left lower leg: No edema.   Skin:     General: Skin is warm and dry.   Neurological:      General: No focal deficit present.      Mental Status: She is alert and oriented to person, place, and time.      Cranial Nerves: No cranial nerve deficit.      Sensory: No sensory deficit.      Motor: No weakness.   Psychiatric:         Mood and Affect: Mood normal.         Behavior: Behavior normal.                    Medical Decision Making:      Comorbidities that affect care:    None    External Notes reviewed:    Previous Clinic Note: GI note from 12/10.  Seen for Ying's esophagus, dyspepsia, abdominal bloating, irregular bowel habits.      The " following orders were placed and all results were independently analyzed by me:  Orders Placed This Encounter   Procedures    Comprehensive Metabolic Panel    Magnesium    Urinalysis With Microscopic If Indicated (No Culture) - Urine, Clean Catch    CBC Auto Differential    Ambulatory Referral to Neurology    CBC & Differential    Extra Tubes    Gold Top - SST    Light Blue Top       Medications Given in the Emergency Department:  Medications   hydrOXYzine (ATARAX) tablet 50 mg (50 mg Oral Given 12/13/24 1622)   methocarbamol (ROBAXIN) tablet 1,000 mg (1,000 mg Oral Given 12/13/24 1622)        ED Course:         Labs:    Lab Results (last 24 hours)       Procedure Component Value Units Date/Time    CBC & Differential [[id number]]  (Normal) Collected: 12/13/24 1540    Specimen: Blood Updated: 12/13/24 1545    Narrative:      The following orders were created for panel order CBC & Differential.  Procedure                               Abnormality         Status                     ---------                               -----------         ------                     CBC Auto Differential[[id number]]        Normal              Final result                 Please view results for these tests on the individual orders.    Comprehensive Metabolic Panel [[id number]]  (Abnormal) Collected: 12/13/24 1540    Specimen: Blood Updated: 12/13/24 1611     Glucose 96 mg/dL      BUN 18 mg/dL      Creatinine 0.65 mg/dL      Sodium 140 mmol/L      Potassium 3.7 mmol/L      Chloride 103 mmol/L      CO2 28.9 mmol/L      Calcium 9.2 mg/dL      Total Protein 6.8 g/dL      Albumin 4.2 g/dL      ALT (SGPT) 12 U/L      AST (SGOT) 16 U/L      Alkaline Phosphatase 85 U/L      Total Bilirubin 0.4 mg/dL      Globulin 2.6 gm/dL      A/G Ratio 1.6 g/dL      BUN/Creatinine Ratio 27.7     Anion Gap 8.1 mmol/L      eGFR 109.4 mL/min/1.73     Narrative:      GFR Categories in Chronic Kidney Disease (CKD)      GFR Category          GFR (mL/min/1.73)     Interpretation  G1                     90 or greater         Normal or high (1)  G2                      60-89                Mild decrease (1)  G3a                   45-59                Mild to moderate decrease  G3b                   30-44                Moderate to severe decrease  G4                    15-29                Severe decrease  G5                    14 or less           Kidney failure          (1)In the absence of evidence of kidney disease, neither GFR category G1 or G2 fulfill the criteria for CKD.    eGFR calculation 2021 CKD-EPI creatinine equation, which does not include race as a factor    Magnesium [[id number]]  (Normal) Collected: 12/13/24 1540    Specimen: Blood Updated: 12/13/24 1611     Magnesium 2.2 mg/dL     Urinalysis With Microscopic If Indicated (No Culture) - Urine, Clean Catch [[id number]]  (Normal) Collected: 12/13/24 1540    Specimen: Urine, Clean Catch Updated: 12/13/24 1550     Color, UA Yellow     Appearance, UA Clear     pH, UA 7.0     Specific Gravity, UA 1.007     Glucose, UA Negative     Ketones, UA Negative     Bilirubin, UA Negative     Blood, UA Negative     Protein, UA Negative     Leuk Esterase, UA Negative     Nitrite, UA Negative     Urobilinogen, UA 0.2 E.U./dL    Narrative:      Urine microscopic not indicated.    CBC Auto Differential [[id number]]  (Normal) Collected: 12/13/24 1540    Specimen: Blood Updated: 12/13/24 1545     WBC 6.77 10*3/mm3      RBC 4.51 10*6/mm3      Hemoglobin 13.5 g/dL      Hematocrit 40.8 %      MCV 90.5 fL      MCH 29.9 pg      MCHC 33.1 g/dL      RDW 13.2 %      RDW-SD 43.8 fl      MPV 9.8 fL      Platelets 263 10*3/mm3      Neutrophil % 49.1 %      Lymphocyte % 43.1 %      Monocyte % 5.8 %      Eosinophil % 1.5 %      Basophil % 0.4 %      Immature Grans % 0.1 %      Neutrophils, Absolute 3.32 10*3/mm3      Lymphocytes, Absolute 2.92 10*3/mm3      Monocytes, Absolute 0.39 10*3/mm3      Eosinophils, Absolute 0.10 10*3/mm3      Basophils,  Absolute 0.03 10*3/mm3      Immature Grans, Absolute 0.01 10*3/mm3      nRBC 0.0 /100 WBC              Imaging:    No Radiology Exams Resulted Within Past 24 Hours      Differential Diagnosis and Discussion:    Extremity Pain: Differential diagnosis includes but is not limited to soft tissue sprain, tendonitis, tendon injury, dislocation, fracture, deep vein thrombosis, arterial insufficiency, osteoarthritis, bursitis, and ligamentous damage.  Paresthesia: Differential diagnosis includes but is not limited to acute stroke, electrolyte imbalance, anxiety, radiculopathy, autoimmune disorders, and endocrine disorders.    PROCEDURES:    Labs were drawn in the emergency department and all labs were reviewed and interpreted by me.    No orders to display       Procedures    MDM     Amount and/or Complexity of Data Reviewed  Clinical lab tests: reviewed and ordered    Risk of Complications, Morbidity, and/or Mortality  Presenting problems: low  Diagnostic procedures: low  Management options: low    Patient Progress  Patient progress: stable                       Patient Care Considerations:    CT HEAD: I considered ordering a noncontrast CT of the head, however patient has no neurological symptoms at time of my examination, no neurodeficit.  Sensation is intact.  I considered obtaining duplex ultrasound however the symptoms are affecting all 4 extremities, patient has evidence of normal blood flow to all 4 extremities with normal sensation.      Consultants/Shared Management Plan:    None    Social Determinants of Health:    Patient is independent, reliable, and has access to care.       Disposition and Care Coordination:    Discharged: The patient is suitable and stable for discharge with no need for consideration of admission.    I have explained the patient´s condition, diagnoses and treatment plan based on the information available to me at this time. I have answered questions and addressed any concerns. The patient  has a good  understanding of the patient´s diagnosis, condition, and treatment plan as can be expected at this point. The vital signs have been stable. The patient´s condition is stable and appropriate for discharge from the emergency department.      The patient will pursue further outpatient evaluation with the primary care physician or other designated or consulting physician as outlined in the discharge instructions. They are agreeable to this plan of care and follow-up instructions have been explained in detail. The patient has received these instructions in written format and has expressed an understanding of the discharge instructions. The patient is aware that any significant change in condition or worsening of symptoms should prompt an immediate return to this or the closest emergency department or call to 911.  I have explained discharge medications and the need for follow up with the patient/caretakers. This was also printed in the discharge instructions. Patient was discharged with the following medications and follow up:      Medication List        New Prescriptions      hydrOXYzine 25 MG tablet  Commonly known as: ATARAX  Take 1 tablet by mouth 3 (Three) Times a Day As Needed for Anxiety for up to 30 days.     methocarbamol 500 MG tablet  Commonly known as: ROBAXIN  Take 1 tablet by mouth 4 (Four) Times a Day As Needed for Muscle Spasms for up to 10 days.               Where to Get Your Medications        These medications were sent to [facility]/pharmacy #[id number] - [place], [place] - [address] - [phone] [facility] [phone]  [address] [place] [address]      Hours: 24-hours Phone: [phone]   hydrOXYzine 25 MG tablet  methocarbamol 500 MG tablet      No follow-up provider specified.     Final diagnoses:   Muscle spasm        ED Disposition       ED Disposition   Discharge    Condition   Stable    Comment   --               This medical record created using voice recognition  software.             Saida Romeo PA-C  12/13/24 [id number]

## 2024-12-31 ENCOUNTER — HOSPITAL ENCOUNTER (OUTPATIENT)
Dept: ULTRASOUND IMAGING | Facility: HOSPITAL | Age: 49
Discharge: HOME | End: 2024-12-31
Payer: MEDICARE

## 2024-12-31 ENCOUNTER — HOSPITAL ENCOUNTER (OUTPATIENT)
Dept: CT IMAGING | Facility: HOSPITAL | Age: 49
Discharge: HOME | End: 2024-12-31
Payer: MEDICARE

## 2024-12-31 DIAGNOSIS — R10.31 RIGHT LOWER QUADRANT ABDOMINAL PAIN: ICD-10-CM

## 2024-12-31 DIAGNOSIS — R10.2 PELVIC PAIN: ICD-10-CM

## 2024-12-31 DIAGNOSIS — R19.4 CHANGE IN BOWEL HABITS: ICD-10-CM

## 2024-12-31 DIAGNOSIS — R10.11 RIGHT UPPER QUADRANT ABDOMINAL PAIN: ICD-10-CM

## 2024-12-31 PROCEDURE — 74177 CT ABD & PELVIS W/CONTRAST: CPT

## 2024-12-31 PROCEDURE — 25510000001 IOPAMIDOL PER 1 ML: Performed by: NURSE PRACTITIONER

## 2024-12-31 PROCEDURE — 76856 US EXAM PELVIC COMPLETE: CPT

## 2024-12-31 RX ORDER — IOPAMIDOL 755 MG/ML
100 INJECTION, SOLUTION INTRAVASCULAR
Status: COMPLETED | OUTPATIENT
Start: 2024-12-31 | End: 2024-12-31

## 2024-12-31 RX ADMIN — IOPAMIDOL 100 ML: 755 INJECTION, SOLUTION INTRAVENOUS at 14:46

## 2025-01-07 ENCOUNTER — TELEPHONE (OUTPATIENT)
Dept: GASTROENTEROLOGY | Facility: CLINIC | Age: 50
End: 2025-01-07
Payer: MEDICARE

## 2025-01-07 NOTE — TELEPHONE ENCOUNTER
----- Message from Charlee Willis sent at 1/6/2025  4:17 PM EST -----  Please let pt know that CT is normal for GI tract.  It does show some disc disease at L5-S1 with disc bulge.  If she's having symptoms from this (lower back pain or numbness) recommend f/u with PCP or neurology.

## 2025-01-23 NOTE — PRE-PROCEDURE INSTRUCTIONS
Instructed on date and arrival time [id number]  Come to entrance 'C'. Must have a  over age of 18 to drive home.May have two visitors; however children under the age of 12 must stay in waiting room. Discussed clear liquid diet(no red or purple), bowel prep,and NPO. May take medications as usual except for blood thinners,diabetic medications, and weight loss medications.verbalizes understanding of instructions given.Instructed to call for questions or concerns.

## 2025-01-28 ENCOUNTER — TELEPHONE (OUTPATIENT)
Dept: GASTROENTEROLOGY | Facility: CLINIC | Age: 50
End: 2025-01-28
Payer: MEDICARE

## 2025-01-28 RX ORDER — POLYETHYLENE GLYCOL 3350, SODIUM SULFATE, POTASSIUM CHLORIDE, MAGNESIUM SULFATE, AND SODIUM CHLORIDE FOR ORAL SOLUTION 178.7-7.3G
178.7 KIT ORAL TAKE AS DIRECTED
Qty: 1 EACH | Refills: 0 | Status: ON HOLD | OUTPATIENT
Start: 2025-01-28 | End: 2025-02-03

## 2025-01-28 NOTE — TELEPHONE ENCOUNTER
Caller: SAMANTHA MCKENNA    Relationship: SELF    Best call back number: [phone]    Requested Prescriptions:   BOWEL PREP, PROCEDURE ON 2.03    Pharmacy where request should be sent:    [facility] IN [place]     Last office visit with prescribing clinician: 12/10/2024   Last telemedicine visit with prescribing clinician: Visit date not found   Next office visit with prescribing clinician: 6/30/2025     Additional details provided by patient:     Does the patient have less than a 3 day supply:  [x] Yes  [] No    Would you like a call back once the refill request has been completed: [x] Yes [] No    If the office needs to give you a call back, can they leave a voicemail: [x] Yes [] No    Randy Mireles Rep   01/28/25 09:53 EST

## 2025-01-28 NOTE — TELEPHONE ENCOUNTER
Hub staff attempted to follow warm transfer process and was unsuccessful     Caller: Solange Srivastava    Relationship to patient: Self    Best call back number: [phone]    Patient is needing: PATIENT WENT TO  PREP IT WILL COST  DOLLARS. PATIENT DOESN'T WANT TO PAY THAT WANTS TO KNOW IS THERE ANYTHING OVER THE COUNTER THAT SHE CAN USE INSTEAD THAT WON'T COST AS MUCH. PLEASE CALL HER BACK ASAP PROCEDURE IS 2-3-25 WITH DR GARRIDO

## 2025-01-31 ENCOUNTER — ANESTHESIA EVENT (OUTPATIENT)
Dept: GASTROENTEROLOGY | Facility: HOSPITAL | Age: 50
End: 2025-01-31
Payer: MEDICARE

## 2025-01-31 NOTE — ANESTHESIA PREPROCEDURE EVALUATION
Anesthesia Evaluation     Nursing notes reviewed   history of anesthetic complications:   NPO Solid Status: > 8 hours  NPO Liquid Status: > 2 hours           Airway   Mallampati: II  TM distance: >3 FB  Neck ROM: full  No difficulty expected  Dental          Pulmonary     breath sounds clear to auscultation  Cardiovascular     Rhythm: regular  Rate: normal      ROS comment: EKG 1/17/23  HEART RATE= 61  bpm  RR Interval= 984  ms  ND Interval= 140  ms  P Horizontal Axis= 17  deg  P Front Axis= 38  deg  QRSD Interval= 84  ms  QT Interval= 414  ms  QRS Axis= 20  deg  T Wave Axis= 41  deg  - NORMAL ECG -  Sinus rhythm  When compared with ECG of 17-Jan-2023 15:40:12,  No significant change  Electronically Signed By: Abdias Ruiz ([facility]) 25-Jan-2023 13:18:50  Date and Time of Study: 2023-01-17 17:29:26      Neuro/Psych  (+) numbness, psychiatric history  GI/Hepatic/Renal/Endo    (+) liver disease    Musculoskeletal     Abdominal    Substance History      OB/GYN          Other   arthritis,   history of cancer    ROS/Med Hx Other: Sips of H2O at 0815                    Anesthesia Plan    ASA 2     general     (Total IV Anesthesia    Patient understands anesthesia not responsible for dental damage.  )  intravenous induction     Anesthetic plan, risks, benefits, and alternatives have been provided, discussed and informed consent has been obtained with: patient.    Plan discussed with CRNA.        CODE STATUS:

## 2025-02-03 ENCOUNTER — HOSPITAL ENCOUNTER (OUTPATIENT)
Facility: HOSPITAL | Age: 50
Setting detail: HOSPITAL OUTPATIENT SURGERY
Discharge: HOME | End: 2025-02-03
Attending: INTERNAL MEDICINE | Admitting: INTERNAL MEDICINE
Payer: MEDICARE

## 2025-02-03 ENCOUNTER — ANESTHESIA (OUTPATIENT)
Dept: GASTROENTEROLOGY | Facility: HOSPITAL | Age: 50
End: 2025-02-03
Payer: MEDICARE

## 2025-02-03 VITALS
BODY MASS INDEX: 26.22 KG/M2 | RESPIRATION RATE: 14 BRPM | HEIGHT: 61 IN | TEMPERATURE: 96.5 F | DIASTOLIC BLOOD PRESSURE: 48 MMHG | HEART RATE: 51 BPM | SYSTOLIC BLOOD PRESSURE: 90 MMHG | WEIGHT: 138.89 LBS | OXYGEN SATURATION: 100 %

## 2025-02-03 DIAGNOSIS — K22.70 BARRETT'S ESOPHAGUS WITHOUT DYSPLASIA: ICD-10-CM

## 2025-02-03 DIAGNOSIS — R10.11 RIGHT UPPER QUADRANT ABDOMINAL PAIN: ICD-10-CM

## 2025-02-03 DIAGNOSIS — R19.4 CHANGE IN BOWEL HABITS: ICD-10-CM

## 2025-02-03 DIAGNOSIS — R10.31 RIGHT LOWER QUADRANT ABDOMINAL PAIN: ICD-10-CM

## 2025-02-03 DIAGNOSIS — R10.13 DYSPEPSIA: ICD-10-CM

## 2025-02-03 PROCEDURE — 25010000002 PROPOFOL 10 MG/ML EMULSION: Performed by: NURSE ANESTHETIST, CERTIFIED REGISTERED

## 2025-02-03 PROCEDURE — 45385 COLONOSCOPY W/LESION REMOVAL: CPT | Performed by: INTERNAL MEDICINE

## 2025-02-03 PROCEDURE — 25010000002 LIDOCAINE PF 2% 2 % SOLUTION: Performed by: NURSE ANESTHETIST, CERTIFIED REGISTERED

## 2025-02-03 PROCEDURE — 25810000003 LACTATED RINGERS PER 1000 ML: Performed by: NURSE ANESTHETIST, CERTIFIED REGISTERED

## 2025-02-03 PROCEDURE — 88305 TISSUE EXAM BY PATHOLOGIST: CPT | Performed by: INTERNAL MEDICINE

## 2025-02-03 PROCEDURE — 43239 EGD BIOPSY SINGLE/MULTIPLE: CPT | Performed by: INTERNAL MEDICINE

## 2025-02-03 RX ORDER — SODIUM CHLORIDE, SODIUM LACTATE, POTASSIUM CHLORIDE, CALCIUM CHLORIDE 600; 310; 30; 20 MG/100ML; MG/100ML; MG/100ML; MG/100ML
30 INJECTION, SOLUTION INTRAVENOUS CONTINUOUS
Status: DISCONTINUED | OUTPATIENT
Start: 2025-02-03 | End: 2025-02-03 | Stop reason: HOSPADM

## 2025-02-03 RX ORDER — LIDOCAINE HYDROCHLORIDE 20 MG/ML
INJECTION, SOLUTION EPIDURAL; INFILTRATION; INTRACAUDAL; PERINEURAL AS NEEDED
Status: DISCONTINUED | OUTPATIENT
Start: 2025-02-03 | End: 2025-02-03 | Stop reason: SURG

## 2025-02-03 RX ORDER — PHENYLEPHRINE HCL IN 0.9% NACL 1 MG/10 ML
SYRINGE (ML) INTRAVENOUS AS NEEDED
Status: DISCONTINUED | OUTPATIENT
Start: 2025-02-03 | End: 2025-02-03 | Stop reason: SURG

## 2025-02-03 RX ORDER — OMEPRAZOLE 40 MG/1
40 CAPSULE, DELAYED RELEASE ORAL DAILY
Qty: 30 CAPSULE | Refills: 0 | Status: SHIPPED | OUTPATIENT
Start: 2025-02-03

## 2025-02-03 RX ORDER — PROPOFOL 10 MG/ML
VIAL (ML) INTRAVENOUS AS NEEDED
Status: DISCONTINUED | OUTPATIENT
Start: 2025-02-03 | End: 2025-02-03 | Stop reason: SURG

## 2025-02-03 RX ADMIN — LIDOCAINE HYDROCHLORIDE 50 MG: 20 INJECTION, SOLUTION EPIDURAL; INFILTRATION; INTRACAUDAL; PERINEURAL at 10:11

## 2025-02-03 RX ADMIN — Medication 200 MCG: at 10:25

## 2025-02-03 RX ADMIN — Medication 200 MCG: at 10:39

## 2025-02-03 RX ADMIN — SODIUM CHLORIDE, POTASSIUM CHLORIDE, SODIUM LACTATE AND CALCIUM CHLORIDE 30 ML/HR: 600; 310; 30; 20 INJECTION, SOLUTION INTRAVENOUS at 09:25

## 2025-02-03 RX ADMIN — PROPOFOL 100 MG: 10 INJECTION, EMULSION INTRAVENOUS at 10:11

## 2025-02-03 RX ADMIN — PROPOFOL 175 MCG/KG/MIN: 10 INJECTION, EMULSION INTRAVENOUS at 10:11

## 2025-02-03 RX ADMIN — Medication 100 MCG: at 10:14

## 2025-02-03 NOTE — H&P
Pre Procedure History & Physical    Chief Complaint:   Ying's esophagus, RUQ/RLQ pain, bloating, irregular bowel habits    Subjective     HPI:   48 yo F here for eval of Ying's esophagus, RUQ/RLQ pain, bloating, irregular bowel habits.    Past Medical History:   Past Medical History:   Diagnosis Date    Anxiety     Arthritis     Breast cancer     Claustrophobia     Cold sore     Colitis     Depression     Fatty liver     Night sweats     PONV (postoperative nausea and vomiting)     has had scope patch before    Sciatic leg pain     left side    Seasonal allergies     Sinus trouble     Sprain of ankle, right 01/25/2018    Tobacco use        Past Surgical History:  Past Surgical History:   Procedure Laterality Date    ABDOMINOPLASTY  10/15/2019    AMPUTATION HAND Right     APPENDECTOMY      BREAST BIOPSY Bilateral     BREAST RECONSTRUCTION  2018    with tissue expander     FAT GRAFTING  1/23/19,6/4/19,10/15/1    an excess skin excision     HAND SURGERY Bilateral 2010    thumb amputation     HYSTERECTOMY      left one ovary    LUMBAR DISCECTOMY Left 12/23/2022    Procedure: MINIMALLY INVASIVE LUMBAR DISCECTOMY, left approach, lumbar 4-lumbar 5;  Surgeon: Randy Garcia MD;  Location: [facility];  Service: Neurosurgery;  Laterality: Left;    MASTECTOMY Bilateral 2018    NIPPLE RECONSTRUCTION  06/04/2019    THYROID SURGERY      UPPER GASTROINTESTINAL ENDOSCOPY         Family History:  Family History   Problem Relation Age of Onset    Prostate cancer Brother 38    Diabetes Brother     Breast cancer Maternal Aunt 55    Melanoma Maternal Aunt 45    Stroke Father     Diabetes Maternal Grandmother     Breast cancer Other         malignant    Melanoma Other     Diabetes Maternal Aunt        Social History:   reports that she has quit smoking. Her smoking use included cigarettes. She has a 6.3 pack-year smoking history. She has never used smokeless tobacco. She reports current alcohol use. She reports that she does  "not use drugs.    Medications:   Medications Prior to Admission   Medication Sig Dispense Refill Last Dose/Taking    ascorbic acid (VITAMIN C) 500 MG capsule controlled-release CR capsule Vitamin C 500 mg oral capsule, extended release take 1 capsule by oral route daily   Suspended   Past Week    Calcium Carbonate (CALCIUM 600 PO) Take  by mouth.   Past Week    Cholecalciferol 25 MCG (1000 UT) capsule Vitamin D3 1,000 unit oral capsule take 1 capsule by oral route daily   Active   Past Week    Omega-3 Fatty Acids (FISH OIL) 1000 MG capsule capsule Take  by mouth Daily With Breakfast.   Past Week    omeprazole (priLOSEC) 20 MG capsule Take 1 capsule by mouth Daily. before a meal 90 capsule 2 Past Week    vitamin E 100 UNIT capsule vitamin E 100 unit oral capsule take 1 capsule by oral route daily   Active   Past Week       Allergies:  Azo [phenazopyridine] and Sulfa antibiotics    ROS:    Pertinent items are noted in HPI     Objective     Blood pressure 97/76, pulse 70, temperature 98.2 °F (36.8 °C), temperature source Temporal, resp. rate 13, height 154.9 cm (61\"), weight 63 kg (138 lb 14.2 oz), SpO2 100%.    Physical Exam   Constitutional: Pt is oriented to person, place, and time and well-developed, well-nourished, and in no distress.   Mouth/Throat: Oropharynx is clear and moist.   Neck: Normal range of motion.   Cardiovascular: Normal rate, regular rhythm and normal heart sounds.    Pulmonary/Chest: Effort normal and breath sounds normal.   Abdominal: Soft. Nontender  Skin: Skin is warm and dry.   Psychiatric: Mood, memory, affect and judgment normal.     Assessment & Plan     Diagnosis:  Ying's esophagus, RUQ/RLQ pain, bloating, irregular bowel habits    Anticipated Surgical Procedure:  EGD/colonoscopy    The risks, benefits, and alternatives of this procedure have been discussed with the patient or the responsible party- the patient understands and agrees to proceed.           "

## 2025-02-03 NOTE — ANESTHESIA POSTPROCEDURE EVALUATION
Patient: Solange Srivastava    Procedure Summary       Date: 02/03/25 Room / Location: [facility] / [facility]    Anesthesia Start: 1008 Anesthesia Stop: 1046    Procedures:       ESOPHAGOGASTRODUODENOSCOPY WITH BIOPSIES/      COLONOSCOPY WITH POLYPECTOMY/SNARE Diagnosis:       Change in bowel habits      Right lower quadrant abdominal pain      Ying's esophagus without dysplasia      Dyspepsia      Right upper quadrant abdominal pain      (Change in bowel habits [R19.4])      (Right lower quadrant abdominal pain [R10.31])      (Ying's esophagus without dysplasia [K22.70])      (Dyspepsia [R10.13])      (Right upper quadrant abdominal pain [R10.11])    Surgeons: Maria Fernanda Hill MD Provider: Randy Campos CRNA    Anesthesia Type: general ASA Status: 2            Anesthesia Type: general    Vitals  Vitals Value Taken Time   BP 90/48 02/03/25 1110   Temp 35.8 °C (96.5 °F) 02/03/25 1110   Pulse 51 02/03/25 1110   Resp 14 02/03/25 1110   SpO2 100 % 02/03/25 1110           Post Anesthesia Care and Evaluation    Patient location during evaluation: bedside  Patient participation: complete - patient participated  Level of consciousness: awake  Pain management: adequate    Airway patency: patent  Anesthetic complications: No anesthetic complications  PONV Status: controlled  Cardiovascular status: acceptable and stable  Respiratory status: acceptable

## 2025-02-04 LAB
CYTO UR: NORMAL
LAB AP CASE REPORT: NORMAL
LAB AP CLINICAL INFORMATION: NORMAL
PATH REPORT.FINAL DX SPEC: NORMAL
PATH REPORT.GROSS SPEC: NORMAL

## 2025-03-12 ENCOUNTER — OFFICE VISIT (OUTPATIENT)
Dept: NEUROLOGY | Facility: CLINIC | Age: 50
End: 2025-03-12
Payer: COMMERCIAL

## 2025-03-12 ENCOUNTER — PATIENT ROUNDING (BHMG ONLY) (OUTPATIENT)
Dept: NEUROLOGY | Facility: CLINIC | Age: 50
End: 2025-03-12
Payer: MEDICARE

## 2025-03-12 VITALS
HEART RATE: 75 BPM | WEIGHT: 141 LBS | DIASTOLIC BLOOD PRESSURE: 68 MMHG | HEIGHT: 61 IN | SYSTOLIC BLOOD PRESSURE: 102 MMHG | BODY MASS INDEX: 26.62 KG/M2

## 2025-03-12 DIAGNOSIS — G24.3 CERVICAL DYSTONIA: Primary | ICD-10-CM

## 2025-03-12 PROBLEM — M53.9 CERVICAL DYSFUNCTION: Status: ACTIVE | Noted: 2025-03-12

## 2025-03-12 PROCEDURE — 99204 OFFICE O/P NEW MOD 45 MIN: CPT | Performed by: PSYCHIATRY & NEUROLOGY

## 2025-03-12 RX ORDER — MAGNESIUM GLYCINATE 100 MG
1 CAPSULE ORAL DAILY
COMMUNITY

## 2025-03-12 NOTE — ASSESSMENT & PLAN NOTE
I discussed with her that she has cervical dystonia is causing her neck pain and left shoulder pain which has been chronic.  I showed her video on cervical dystonia as well as on Botox injections.  I will refer her for evaluation and treatment.  I discussed with her that it anything has anything do with her left shoulder.    In regards to her muscle spasms in her legs and arms it is of questionable etiology however it may be related to a generalized dystonia as well.  I discussed with her that her cervical dystonia is a focal dystonia that will respond to Botox injections.  I will see her in the future as needed.  Thank you for let me persuade her care.

## 2025-03-12 NOTE — PROGRESS NOTES
"Chief Complaint  Neurologic Problem (MUSCLE SPASMS IN BLE/BUE)    Subjective          Solange Srivastava is a 50 y.o. female who presents to [facility]  History of Present Illness  42-year-old woman evaluated for muscle spasms of her legs and arms as well as pain in her neck and left shoulder and vision complaints.  She states that 6 months ago she had an episode in which she was awoken from sleep with tightness in her legs but it was not really a cramp and involve her legs and arms.  She has had that experience before but it was only in her arms.  She felt like she had to constantly stretch her muscles in her leg and arms and it did not get better and therefore she went to the emergency room and they gave her muscle relaxer.  It eventually went away.  She has not had any recurrent symptoms.  She is complaining of neck pain in the left side which has gotten worse in the last 6 months.  She has been to physical therapy.  She states that it is also on the right side bothers her once a week.  She has been seeing orthopedics and they told her there is something wrong with her left shoulder but they do not think her symptoms are secondary to her left shoulder.  She feels tightness in her neck and shoulders.  She has had an MRI of the cervical spine in 2019 for the same problem of neck pain with burning sensation since 2018.  It was unremarkable.  She also had an MRI of the brain 2023 which is again unremarkable.  It was ordered because she has breast cancer and they wanted to look for metastatic lesions.    She states that she has had blurring of her vision as well and she wanted to know if that is related to her her neck pain.  She has no history of migraine headaches.    Objective   Vital Signs:   /68 (BP Location: Right arm, Patient Position: Sitting, Cuff Size: Adult)   Pulse 75   Ht 154.9 cm (60.98\")   Wt 64 kg (141 lb)   BMI 26.66 kg/m²     Physical Exam   Alert, fluent, " phasic, follows commands well.  Optic disc are normal bilaterally near vision is 20/25 both eyes, 20/30 left eye, 20/40 right eye.  Far vision is 20/30 both eyes, 20/40 left eye and 20/40 right eye.  Visual fields are full confrontation, EOMs full directions gaze, facial strength is full, soft palate elevation and tongue are normal.  There is no weakness of the upper or lower extremities and vision muscle testing.  Fine finger movements are intact.  Reflexes are normoactive and symmetrical.  Cerebellar testing is intact.  Station gait is unremarkable.  She has no weakness on tiptoe, heel walk.    She has head tilt to the right side and the left shoulder is higher on the left side.  There is tenderness on the left paracervical muscle on the left trapezius.  Range of motion of the left arm is normal on extension, elevation, reaching her back.        Assessment and Plan  Diagnoses and all orders for this visit:    1. Cervical dysfunction (Primary)  -     Ambulatory Referral to Neurology    2. Cervical dystonia  Assessment & Plan:  I discussed with her that she has cervical dystonia is causing her neck pain and left shoulder pain which has been chronic.  I showed her video on cervical dystonia as well as on Botox injections.  I will refer her for evaluation and treatment.  I discussed with her that it anything has anything do with her left shoulder.    In regards to her muscle spasms in her legs and arms it is of questionable etiology however it may be related to a generalized dystonia as well.  I discussed with her that her cervical dystonia is a focal dystonia that will respond to Botox injections.  I will see her in the future as needed.  Thank you for let me persuade her care.           Total time spent with the patient and coordinating patient care was 50 minutes.    Follow Up  No follow-ups on file.  Patient was given instructions and counseling regarding her condition or for health maintenance advice. Please see  specific information pulled into the AVS if appropriate.

## 2025-03-25 ENCOUNTER — OFFICE VISIT (OUTPATIENT)
Dept: NEUROLOGY | Facility: CLINIC | Age: 50
End: 2025-03-25
Payer: MEDICARE

## 2025-03-25 ENCOUNTER — PATIENT ROUNDING (BHMG ONLY) (OUTPATIENT)
Dept: NEUROLOGY | Facility: CLINIC | Age: 50
End: 2025-03-25
Payer: MEDICARE

## 2025-03-25 VITALS
HEIGHT: 61 IN | WEIGHT: 141.1 LBS | BODY MASS INDEX: 26.64 KG/M2 | HEART RATE: 87 BPM | DIASTOLIC BLOOD PRESSURE: 81 MMHG | SYSTOLIC BLOOD PRESSURE: 108 MMHG

## 2025-03-25 DIAGNOSIS — G24.3 CERVICAL DYSTONIA: Primary | ICD-10-CM

## 2025-03-25 PROCEDURE — 1159F MED LIST DOCD IN RCRD: CPT | Performed by: NURSE PRACTITIONER

## 2025-03-25 PROCEDURE — 99214 OFFICE O/P EST MOD 30 MIN: CPT | Performed by: NURSE PRACTITIONER

## 2025-03-25 PROCEDURE — G2211 COMPLEX E/M VISIT ADD ON: HCPCS | Performed by: NURSE PRACTITIONER

## 2025-03-25 PROCEDURE — 1160F RVW MEDS BY RX/DR IN RCRD: CPT | Performed by: NURSE PRACTITIONER

## 2025-03-25 NOTE — PROGRESS NOTES
"Chief Complaint  Neurologic Problem    Subjective          Solange Srivastava presents to [facility]  History of Present Illness    History of Present Illness  The patient is a 50-year-old female who presents to the office for an initial consult for cervical dystonia, referred by Dr. Lambert.    She has been experiencing neck pain, which she attributes to muscle tightness following physical exertion. The onset of this pain was approximately 2 months ago. She reports that the application of a muscle cream exacerbates the pain, causing it to throb and persist throughout the night. A recent episode of pain in the same area was triggered by the application of the muscle cream.    Additionally, shoulder issues have been present for the past 6 months, somewhat alleviated by the use of a TENS unit. She has received 2 injections in her shoulder and has undergone physical therapy, but the pain recurs once the effects of the injection subside. Physical therapy continues, but neck manipulation is avoided. She supplements with juli and turmeric to manage inflammation.    She had originally set the appointment due to restless leg syndrome, which she managed by stretching her muscles. After researching, she began taking magnesium, suspecting a deficiency. Three episodes occurred, one lasting all day and affecting her upper body. An ER visit resulted in pain medication and anxiety medicine. Since starting magnesium, no further issues have been reported.    PAST SURGICAL HISTORY: She mentions a past back surgery due to L4 and L5 bulging on a nerve, performed after 6 months of persistent pain.    MEDICATIONS  CURRENT MEDS:  Magnesium  Juli  Turmeric  PREVIOUS MEDS:  Anxiety Medicine  Steroids Oral       Objective   Vital Signs:   /81   Pulse 87   Ht 154.9 cm (60.98\")   Wt 64 kg (141 lb 1.6 oz)   BMI 26.68 kg/m²     Physical Exam  Eyes:      General: Lids are normal.      Extraocular " Movements: Extraocular movements intact.      Pupils: Pupils are equal, round, and reactive to light.   Neck:      Comments: Left shoulder elevation.  Right laterocollis   Musculoskeletal:      Cervical back: Muscular tenderness present. Decreased range of motion.        Neurological Exam  Mental Status  Awake, alert and oriented to person, place and time.    Cranial Nerves  CN II: Visual acuity is normal. Visual fields full to confrontation.  CN III, IV, VI: Extraocular movements intact bilaterally. Normal lids and orbits bilaterally. Pupils equal round and reactive to light bilaterally.  CN V: Facial sensation is normal.  CN VII: Full and symmetric facial movement.  CN IX, X: Palate elevates symmetrically. Normal gag reflex.  CN XI: Shoulder shrug strength is normal.  CN XII: Tongue midline without atrophy or fasciculations.      Result Review :               Assessment and Plan    There are no diagnoses linked to this encounter.    Assessment & Plan  1. Cervical dystonia.  Her symptoms align with a diagnosis of cervical dystonia, characterized by left shoulder elevation and right laterocollis. However, given the recent onset of these symptoms and her known shoulder injury, it is plausible that the dystonia is a secondary condition. The primary issue appears to be an acute shoulder injury, which has led to compensatory muscle tension and subsequent neck pain. This is supported by the fact that her shoulder pain predates her neck pain, and her imaging studies reveal abnormalities in the shoulder. It is noteworthy that patients with dystonia-related shoulder pain typically exhibit normal imaging results. A conservative approach will be adopted initially, with the prescription of diclofenac 50 mg to be taken twice daily, and tizanidine 4 mg to be administered nightly for a duration of 6 to 8 weeks. She is advised to continue her current supplement regimen. Should there be no improvement in her condition after 2  months, more aggressive treatment options will be considered.    2. Shoulder injury.  She has a documented shoulder injury that has been causing significant pain and affecting her daily activities. She has received 2 steroid injections and has been undergoing physical therapy, but the pain returns once the effects of the injections wear off. She is advised to continue working with her orthopedic specialists to explore further treatment options, including potential surgical interventions if necessary. The prescribed diclofenac and tizanidine may also help alleviate some of the shoulder pain.         Follow Up   Return in about 2 months (around 5/25/2025).  Patient was given instructions and counseling regarding her condition or for health maintenance advice. Please see specific information pulled into the AVS if appropriate.       Patient or patient representative verbalized consent for the use of Ambient Listening during the visit with  JASSI Hernández for chart documentation. 3/25/2025  12:40 EDT

## 2025-06-30 ENCOUNTER — OFFICE VISIT (OUTPATIENT)
Dept: GASTROENTEROLOGY | Facility: CLINIC | Age: 50
End: 2025-06-30
Payer: COMMERCIAL

## 2025-06-30 VITALS
BODY MASS INDEX: 26.92 KG/M2 | SYSTOLIC BLOOD PRESSURE: 111 MMHG | HEART RATE: 99 BPM | HEIGHT: 61 IN | DIASTOLIC BLOOD PRESSURE: 71 MMHG | WEIGHT: 142.6 LBS

## 2025-06-30 DIAGNOSIS — K21.00 GASTROESOPHAGEAL REFLUX DISEASE WITH ESOPHAGITIS WITHOUT HEMORRHAGE: ICD-10-CM

## 2025-06-30 DIAGNOSIS — R14.0 ABDOMINAL BLOATING: Primary | ICD-10-CM

## 2025-06-30 RX ORDER — OMEPRAZOLE 40 MG/1
40 CAPSULE, DELAYED RELEASE ORAL DAILY
Qty: 90 CAPSULE | Refills: 3 | Status: SHIPPED | OUTPATIENT
Start: 2025-06-30

## 2025-06-30 RX ORDER — PRASTERONE (DHEA) 50 MG
CAPSULE ORAL
COMMUNITY

## 2025-06-30 NOTE — PROGRESS NOTES
Chief Complaint     Ying's esophagus without dysplasia and Abdominal Pain    Patient or patient representative verbalized consent for the use of Ambient Listening during the visit with  JASSI Plascencia for chart documentation. 6/30/2025  15:03 EDT      History of Present Illness     History of Present Illness  The patient presents for follow-up of change in bowel habits, right lower quadrant abdominal pain, Ying's esophagus, dyspepsia, and right upper quadrant abdominal pain.    She reports satisfactory bowel movements, typically having one each morning if she maintains a healthy diet and regular exercise. However, she experienced only two bowel movements during her recent 10-day vacation. She is uncertain if her current diet is causing discomfort. She experiences bloating and discomfort when her stomach swells, to the point where she cannot tolerate anything touching it, even loose clothing. This discomfort was particularly severe during her recent vacation, prompting her to purchase peppermint oil for relief. She has been taking probiotics and tries to walk after meals. She does not add much salt to her food but occasionally uses pepper. She has not tried FDgard or IBgard. She also reports intermittent belching.    She continues to take omeprazole and reports no heartburn.          History      Past Medical History:   Diagnosis Date    Anxiety     Arthritis     Breast cancer     Claustrophobia     Cold sore     Colitis     Depression     Fatty liver     Night sweats     PONV (postoperative nausea and vomiting)     has had scope patch before    Sciatic leg pain     left side    Seasonal allergies     Sinus trouble     Sprain of ankle, right 01/25/2018    Tobacco use      Past Surgical History:   Procedure Laterality Date    ABDOMINOPLASTY  10/15/2019    AMPUTATION HAND Right     APPENDECTOMY      BREAST BIOPSY Bilateral     BREAST RECONSTRUCTION  2018    with tissue expander     COLONOSCOPY N/A  2/3/2025    Procedure: COLONOSCOPY WITH POLYPECTOMY/SNARE;  Surgeon: Maria Fernanda Hill MD;  Location: [facility] ENDOSCOPY;  Service: Gastroenterology;  Laterality: N/A;  COLON POLYP    ENDOSCOPY N/A 2/3/2025    Procedure: ESOPHAGOGASTRODUODENOSCOPY WITH BIOPSIES/;  Surgeon: Maria Fernanda Hill MD;  Location: [facility] ENDOSCOPY;  Service: Gastroenterology;  Laterality: N/A;  GASTRITIS/HIATAL HERNIA    FAT GRAFTING  1/23/19,6/4/19,10/15/1    an excess skin excision     HAND SURGERY Bilateral 2010    thumb amputation     HYSTERECTOMY      left one ovary    LUMBAR DISCECTOMY Left 12/23/2022    Procedure: MINIMALLY INVASIVE LUMBAR DISCECTOMY, left approach, lumbar 4-lumbar 5;  Surgeon: Randy Garcia MD;  Location: [facility] MAIN OR;  Service: Neurosurgery;  Laterality: Left;    MASTECTOMY Bilateral 2018    NIPPLE RECONSTRUCTION  06/04/2019    THYROID SURGERY      UPPER GASTROINTESTINAL ENDOSCOPY       Family History   Problem Relation Age of Onset    Prostate cancer Brother 38    Diabetes Brother     Breast cancer Maternal Aunt 55    Melanoma Maternal Aunt 45    Stroke Father     Diabetes Maternal Grandmother     Breast cancer Other         malignant    Melanoma Other     Diabetes Maternal Aunt         Current Medications       Current Outpatient Medications:     ascorbic acid (VITAMIN C) 500 MG capsule controlled-release CR capsule, 1 capsule Daily., Disp: , Rfl:     Calcium Carbonate (CALCIUM 600 PO), Take 1 tablet by mouth Daily., Disp: , Rfl:     Cholecalciferol 25 MCG (1000 UT) capsule, Vitamin D3 1,000 unit oral capsule take 1 capsule by oral route daily   Active, Disp: , Rfl:     Juli 500 MG capsule, Take  by mouth., Disp: , Rfl:     Magnesium Glycinate 100 MG capsule, Take 1 tablet by mouth Daily., Disp: , Rfl:     Omega-3 Fatty Acids (FISH OIL) 1000 MG capsule capsule, Take 1 capsule by mouth Daily With Breakfast., Disp: , Rfl:     omeprazole (priLOSEC) 40 MG capsule, Take 1 capsule by mouth Daily. before a  "meal, Disp: 90 capsule, Rfl: 3    TURMERIC PO, Take  by mouth., Disp: , Rfl:     TURMERIC-KARI PO, Take  by mouth., Disp: , Rfl:     vitamin E 100 UNIT capsule, Take 1 capsule by mouth Daily., Disp: , Rfl:      Allergies     Allergies   Allergen Reactions    Azo [Phenazopyridine] Unknown (See Comments)     Unknown     Sulfa Antibiotics Unknown (See Comments)     Unknown        Social History       Social History     Social History Narrative    Not on file         Objective       /71 (BP Location: Left arm, Patient Position: Sitting, Cuff Size: Adult)   Pulse 99   Ht 154.9 cm (60.98\")   Wt 64.7 kg (142 lb 9.6 oz)   BMI 26.96 kg/m²       Physical Exam    Results       Result Review :    The following data was reviewed by: JASSI Plascencia on 06/30/2025:            Results  Imaging   - CT abdomen and pelvis with contrast: 12/31/2024, Small cyst in the liver. Gallbladder appears normal. Pancreas appears normal. No thickening of the colon is evident.    Diagnostic Testing   - EGD: 02/03/2025, The cecum was irregular, was found 35 cm from the incisors. GE junction biopsy with changes consistent with reflux esophagitis, negative for intestinal metaplasia. Nonobstructing Schatzki's ring was found at the GE junction. Small hiatal hernia was present. Patchy mild inflammation characterized by erythema was found in the gastric antrum. Biopsy with no significant pathologic change. First and second portion of the duodenum were normal. Biopsy normal.   - Colonoscopy: Perianal and digital rectal exams were normal. Terminal ileum appeared normal. Two polyps were found in the descending colon that were 3 to 4 mm in size. Hyperplastic polyps completely removed. Anal papillae were hypertrophied. The remainder of the colon appeared normal.           Assessment and Plan              Diagnoses and all orders for this visit:    1. Abdominal bloating (Primary)    2. Gastroesophageal reflux disease with esophagitis " without hemorrhage  -     omeprazole (priLOSEC) 40 MG capsule; Take 1 capsule by mouth Daily. before a meal  Dispense: 90 capsule; Refill: 3        Assessment & Plan  1. Bloating:  - Try [place] Beat the Bloat supplement.  - If symptoms persist, consider a 30-day course of probiotics.  - Monitor salt intake to reduce fluid retention and bloating.  - Provide a sample of FDgard to try.    2. Ying's esophagus:  - Upper endoscopy results are stable, no Ying's cells found.  - No need for repeat endoscopy every 3 years.  - Refill 90-day supply of omeprazole, medication sent to pharmacy.    Follow-up: The patient will follow up in 1 year or sooner if necessary.            Follow Up     Follow Up   Return in about 1 year (around 6/30/2026) for GERD.  Patient was given instructions and counseling regarding her condition or for health maintenance advice. Please see specific information pulled into the AVS if appropriate.

## 2025-07-30 ENCOUNTER — OFFICE VISIT (OUTPATIENT)
Dept: ORTHOPEDIC SURGERY | Facility: CLINIC | Age: 50
End: 2025-07-30
Payer: OTHER MISCELLANEOUS

## 2025-07-30 VITALS
DIASTOLIC BLOOD PRESSURE: 77 MMHG | HEIGHT: 61 IN | WEIGHT: 142 LBS | OXYGEN SATURATION: 97 % | BODY MASS INDEX: 26.81 KG/M2 | HEART RATE: 70 BPM | SYSTOLIC BLOOD PRESSURE: 112 MMHG

## 2025-07-30 DIAGNOSIS — M75.82 ROTATOR CUFF TENDONITIS, LEFT: Primary | ICD-10-CM

## 2025-07-30 DIAGNOSIS — M54.2 CERVICAL PAIN (NECK): ICD-10-CM

## 2025-07-30 DIAGNOSIS — R29.898 DECREASED RANGE OF MOTION OF NECK: ICD-10-CM

## 2025-07-30 DIAGNOSIS — M25.512 LEFT SHOULDER PAIN, UNSPECIFIED CHRONICITY: ICD-10-CM

## 2025-07-30 DIAGNOSIS — S43.432A SUPERIOR GLENOID LABRUM LESION OF LEFT SHOULDER, INITIAL ENCOUNTER: ICD-10-CM

## 2025-07-30 NOTE — PROGRESS NOTES
Chief Complaint  Follow-up and Pain of the Left Shoulder    Subjective      Solange Srivastava presents to [facility]     History of Present Illness  The patient is here today for a follow-up on her left shoulder. She has been experiencing left shoulder pain following a worker's compensation injury. During her last office visit on November 25, 2024, she was referred to physical therapy and received a left shoulder steroid injection.    She has a small rotator cuff tear with tendinopathy and a labral tear, which she has been attempting to treat conservatively. She has also been seen and established with neurology for cervical dystonia, with her last office visit in March 2025. She reports that her condition is deteriorating, with pain that is manageable in the morning but intensifies as the day progresses. She experiences difficulty raising her arm due to a sensation of something catching. Despite using red light therapy and ice, she has not achieved complete relief from her pain. Her sleep is disrupted due to the pain. She continues to perform exercises learned in physical therapy and watches videos to help with her neck issues. She engages in workouts using 5-pound weights but finds it challenging to use 10-pound weights. She recalls an incident yesterday where she was throwing objects in the yard, which exacerbated her irritation.    She has been diagnosed with a tilted neck, for which Botox injections were recommended. However, she opted for muscle relaxers instead, which have not been effective. She has not consulted Dr. Garcia, who previously performed her back surgery. She has been experiencing numbness in her entire arm upon waking up. She is currently not working and has lifetime coverage on her arm.      Allergies   Allergen Reactions    Azo [Phenazopyridine] Unknown (See Comments)     Unknown     Sulfa Antibiotics Unknown (See Comments)     Unknown        Objective     Vital Signs:  "  Vitals:    07/30/25 0942   BP: 112/77   Pulse: 70   SpO2: 97%   Weight: 64.4 kg (142 lb)   Height: 154.9 cm (60.98\")     Body mass index is 26.85 kg/m².    I reviewed the patient's chief complaint, history of present illness, review of systems, past medical history, surgical history, family history, social history, medications, and allergy list.     Ortho Exam    Physical Exam  Musculoskeletal:  Left shoulder: Tenderness upon palpation along the base of the neck and radiating over the trapezius muscle, decreased range of motion of the neck, catching sensation during movement of the shoulder, weakness is reported.  180 degrees active forward shoulder elevation.          MRI Shoulder Left Without Contrast  Narrative: MRI SHOULDER LEFT WO CONTRAST     Date of Exam: 11/18/2024 7:48 AM EST     Indication: left shoulder pain, left shoulder decreased range of motion.     Comparison: Radiograph September 18, 2024     Technique:  Routine multiplanar/multisequence images of the left shoulder were obtained without contrast administration.       Findings:  Rotator cuff:  There is thickening and intermediate signal of the distal supraspinatus and to a lesser extent the distal infraspinatus consistent with tendinopathy. There is enthesopathy at the anterior greater tuberosity with small subcortical cyst and subcortical   edema.     At the posterior supraspinatus insertion there appears to be a small fluid signal defect (primarily within the intrasubstance tendon on coronal image 12 of series 301 and sagittal series 501 image 4). This does appear to extend to the bursal surface at   the anterior aspect on coronal image 13. Overall the finding measures approximately 7 mm in anterior to posterior dimensions. There is possible additional shallow articular surface defect of the distal mid supraspinatus on coronal image 12. No definite   full-thickness tendon defect.     There is subscapularis tendinopathy. There is suspected " intrasubstance defect at the superior subscapularis tendon insertion with enthesopathy at the lesser tuberosity. Teres minor appears intact. No significant muscle edema or atrophy.     Glenohumeral joint:  There is slight superior and posterior subluxation of the numeral head. No significant joint effusion. No findings of significant glenohumeral arthropathy.     There is increased signal in the superior labrum on coronal images suspicious for tear extending anterior to posterior. No paralabral cyst is seen.     Biceps tendon:  There is tendinopathy of the biceps tendon. There is suspicion for partial tear of the distal intra-articular tendon extending into the upper bicipital groove on axial images 14-17. Small amount of fluid in the tendon sheath appears disproportionate to   joint fluid suspicious for tenosynovitis. The tendon appears normally positioned.     Acromioclavicular Joint:  Mild degenerative changes at the acromioclavicular joint. No significant periarticular edema signal. Alignment appears within normal limits.     Bone:  Mild enthesopathy at the greater and lesser tuberosities. No definite fracture. No other suspicious marrow signal abnormality.     Miscellaneous:  Trace fluid in the subacromial/subdeltoid bursa. No significant deltoid muscle atrophy or edema. No pathologically enlarged axillary lymph nodes.  Impression: Impression:  1.Rotator cuff tendinopathy with suspected small intrasubstance and bursal surface tear at the posterior supraspinatus insertion and possible shallow articular surface defect of the distal mid supraspinatus.  2.Subscapularis tendinopathy and suspected intrasubstance defect at the superior insertion. No definite full-thickness rotator cuff tendon defect.  3.Tendinopathy and partial tear of the long head of the biceps tendon with suspected tenosynovitis.  4.Findings suggestive of tear of the superior labrum.   5.Mild acromioclavicular joint degeneration.     Electronically  Signed: Nate Fuller    11/20/2024 9:20 AM EST    Workstation ID: [id number]       Results  Imaging   - MRI of the left shoulder: Small rotator cuff tear with tendinopathy and a labral tear         Assessment and Plan   Diagnoses and all orders for this visit:    1. Rotator cuff tendonitis, left (Primary)  -     MRI Shoulder Left Without Contrast; Future    2. Superior glenoid labrum lesion of left shoulder, initial encounter  -     MRI Shoulder Left Without Contrast; Future    3. Left shoulder pain, unspecified chronicity  -     MRI Shoulder Left Without Contrast; Future    4. Cervical pain (neck)  -     MRI Cervical Spine Without Contrast; Future    5. Decreased range of motion of neck  -     MRI Cervical Spine Without Contrast; Future         Assessment & Plan  1. Left shoulder pain:  She has a small rotator cuff tear with tendinopathy and a labral tear, which she has been trying to treat conservatively. The pain has worsened and radiates to more places. A repeat MRI of the shoulder will be ordered to assess any interval changes or evidence of a full-thickness tear. If there is no significant change, another steroid injection may be considered. The patient is advised to continue with her current physical therapy exercises and avoid activities that exacerbate the pain. She is also encouraged to maintain her exercise routine with 5-pound weights and avoid increasing to 10-pound weights to prevent further irritation. The risks and benefits of the steroid injection were discussed, including potential cartilage damage versus the benefits of pain relief and improved mobility. The patient expressed concerns about the injection causing more damage, and it was agreed to proceed with the MRI first. If the MRI shows no significant changes, the option of another steroid injection will be revisited. The patient was also advised to monitor her symptoms and report any significant changes or worsening of pain.    2. Cervical  dystonia:  She has been seen and established with neurology for cervical dystonia, with her last office visit in March 2025. An MRI of the neck will be ordered to evaluate any contributing factors to her shoulder pain. The patient mentioned that previous treatments, including muscle relaxers, have not been effective. The possibility of Botox injections was discussed, but the patient expressed concerns about side effects and declined further Botox treatment. She is advised to continue with her current neck exercises and physical therapy techniques that she has been performing at home. The patient is encouraged to avoid activities that exacerbate her neck pain and to continue using ice and other pain relief methods as needed. The patient was informed that the MRI results would help determine the next steps in her treatment plan, and she will be contacted once the results are available.    Follow-up: The patient will be contacted once the MRI results are available to schedule a follow-up appointment. If she has not heard from the office by August 6, 2025, she should call to check in.      Tobacco Use: Medium Risk (7/30/2025)    Patient History     Smoking Tobacco Use: Former     Smokeless Tobacco Use: Never     Passive Exposure: Past     Patient reports they have a history of tobacco use; encouraged continued tobacco cessation for further health benefits.                 Follow Up   No follow-ups on file.  There are no Patient Instructions on file for this visit.    Patient was given instructions and counseling regarding her condition or for health maintenance advice. Please see specific information pulled into the AVS if appropriate.     Patient or patient representative verbalized consent for the use of Ambient Listening during the visit with  JASSI Judge for chart documentation. 7/30/2025  10:31 EDT    Dictated Utilizing Dragon Dictation. Please note that portions of this note were completed with a voice  recognition program. Part of this note may be an electronic transcription/translation of spoken language to printed text using the Dragon Dictation System.

## 2025-08-22 ENCOUNTER — HOSPITAL ENCOUNTER (OUTPATIENT)
Dept: MRI IMAGING | Facility: HOSPITAL | Age: 50
Discharge: HOME | End: 2025-08-22
Payer: MEDICARE

## 2025-08-22 DIAGNOSIS — M25.512 LEFT SHOULDER PAIN, UNSPECIFIED CHRONICITY: ICD-10-CM

## 2025-08-22 DIAGNOSIS — M75.82 ROTATOR CUFF TENDONITIS, LEFT: ICD-10-CM

## 2025-08-22 DIAGNOSIS — S43.432A SUPERIOR GLENOID LABRUM LESION OF LEFT SHOULDER, INITIAL ENCOUNTER: ICD-10-CM

## 2025-08-22 DIAGNOSIS — R29.898 DECREASED RANGE OF MOTION OF NECK: ICD-10-CM

## 2025-08-22 DIAGNOSIS — M54.2 CERVICAL PAIN (NECK): ICD-10-CM

## 2025-08-22 PROCEDURE — 72141 MRI NECK SPINE W/O DYE: CPT

## 2025-08-22 PROCEDURE — 73221 MRI JOINT UPR EXTREM W/O DYE: CPT

## (undated) DEVICE — GAMMEX® NON-LATEX SIZE 7.5, STERILE NEOPRENE POWDER-FREE SURGICAL GLOVE: Brand: GAMMEX

## (undated) DEVICE — CONN JET HYDRA H20 AUXILIARY DISP

## (undated) DEVICE — SLV SCD KN/LEN ADJ EXPRSS BLENDED MD 1P/U

## (undated) DEVICE — SOLIDIFIER LIQLOC PLS 1500CC BT

## (undated) DEVICE — THE SINGLE USE ETRAP – POLYP TRAP IS USED FOR SUCTION RETRIEVAL OF ENDOSCOPICALLY REMOVED POLYPS.: Brand: ETRAP

## (undated) DEVICE — STERILE POLYISOPRENE POWDER-FREE SURGICAL GLOVES WITH EMOLLIENT COATING: Brand: PROTEXIS

## (undated) DEVICE — GLV SURG BIOGEL LTX PF 7 1/2

## (undated) DEVICE — THE STERILE LIGHT HANDLE COVER IS USED WITH STERIS SURGICAL LIGHTING AND VISUALIZATION SYSTEMS.

## (undated) DEVICE — ANTIBACTERIAL UNDYED BRAIDED (POLYGLACTIN 910), SYNTHETIC ABSORBABLE SUTURE: Brand: COATED VICRYL

## (undated) DEVICE — SNAR E/S POLYP SNAREMASTER OVL/10MM 2.8X2300MM YEL

## (undated) DEVICE — SOL IRRG H2O PL/BG 1000ML STRL

## (undated) DEVICE — STERILE POLYISOPRENE POWDER-FREE SURGICAL GLOVES: Brand: PROTEXIS

## (undated) DEVICE — Device

## (undated) DEVICE — DRP MICROSCP LECIA W/CLEARLENS 137X381CM

## (undated) DEVICE — PENCL E/S HNDSWCH ROCKR CB

## (undated) DEVICE — LINER SURG CANSTR SXN S/RIGD 1500CC

## (undated) DEVICE — LAMINECTOMY CERVICAL DISC-LF: Brand: MEDLINE INDUSTRIES, INC.

## (undated) DEVICE — SINGLE-USE BIOPSY FORCEPS: Brand: RADIAL JAW 4

## (undated) DEVICE — SUT VIC 0/0 UR6 27IN DYED J603H

## (undated) DEVICE — Device: Brand: DEFENDO AIR/WATER/SUCTION AND BIOPSY VALVE